# Patient Record
Sex: MALE | Race: WHITE | Employment: UNEMPLOYED | URBAN - METROPOLITAN AREA
[De-identification: names, ages, dates, MRNs, and addresses within clinical notes are randomized per-mention and may not be internally consistent; named-entity substitution may affect disease eponyms.]

---

## 2017-05-16 ENCOUNTER — HOSPITAL ENCOUNTER (OUTPATIENT)
Dept: LAB | Age: 47
Discharge: HOME | End: 2017-05-16

## 2017-05-16 PROCEDURE — 83036 HEMOGLOBIN GLYCOSYLATED A1C: CPT | Performed by: NURSE PRACTITIONER

## 2017-05-16 PROCEDURE — 84443 ASSAY THYROID STIM HORMONE: CPT | Performed by: NURSE PRACTITIONER

## 2017-05-16 PROCEDURE — 84460 ALANINE AMINO (ALT) (SGPT): CPT | Performed by: NURSE PRACTITIONER

## 2017-05-16 PROCEDURE — 80061 LIPID PANEL: CPT | Performed by: NURSE PRACTITIONER

## 2017-05-17 LAB
ALT SERPL-CCNC: 32 U/L (ref 12–78)
CHOLEST SERPL-MCNC: 208 MG/DL
EST. AVERAGE GLUCOSE BLD GHB EST-MCNC: 154 MG/DL
HBA1C MFR BLD: 7 % (ref 4.2–6.3)
HDLC SERPL-MCNC: 47 MG/DL
HDLC SERPL: 4.4 {RATIO} (ref 0–5)
LDLC SERPL CALC-MCNC: 127.4 MG/DL (ref 0–100)
LIPID PROFILE,FLP: ABNORMAL
TRIGL SERPL-MCNC: 168 MG/DL (ref ?–150)
TSH SERPL DL<=0.05 MIU/L-ACNC: 2.07 UIU/ML (ref 0.36–3.74)
VLDLC SERPL CALC-MCNC: 33.6 MG/DL

## 2017-07-26 ENCOUNTER — HOSPITAL ENCOUNTER (OUTPATIENT)
Dept: ULTRASOUND IMAGING | Age: 47
Discharge: HOME | End: 2017-07-26
Payer: SUBSIDIZED

## 2017-07-26 DIAGNOSIS — E04.9 ENLARGEMENT OF THYROID: ICD-10-CM

## 2017-07-26 PROCEDURE — 76536 US EXAM OF HEAD AND NECK: CPT

## 2017-08-01 ENCOUNTER — HOSPITAL ENCOUNTER (OUTPATIENT)
Dept: LAB | Age: 47
Discharge: HOME | End: 2017-08-01

## 2017-08-01 LAB
ALBUMIN SERPL BCP-MCNC: 4.2 G/DL (ref 3.5–5)
ALBUMIN/GLOB SERPL: 1.1 {RATIO} (ref 1.1–2.2)
ALP SERPL-CCNC: 122 U/L (ref 45–117)
ALT SERPL-CCNC: 46 U/L (ref 12–78)
ANION GAP BLD CALC-SCNC: 6 MMOL/L (ref 5–15)
AST SERPL W P-5'-P-CCNC: 21 U/L (ref 15–37)
BASOPHILS # BLD AUTO: 0 K/UL (ref 0–0.1)
BASOPHILS # BLD: 0 % (ref 0–1)
BILIRUB SERPL-MCNC: 0.3 MG/DL (ref 0.2–1)
BUN SERPL-MCNC: 16 MG/DL (ref 6–20)
BUN/CREAT SERPL: 20 (ref 12–20)
CALCIUM SERPL-MCNC: 8.8 MG/DL (ref 8.5–10.1)
CHLORIDE SERPL-SCNC: 104 MMOL/L (ref 97–108)
CO2 SERPL-SCNC: 25 MMOL/L (ref 21–32)
CREAT SERPL-MCNC: 0.81 MG/DL (ref 0.7–1.3)
EOSINOPHIL # BLD: 0.1 K/UL (ref 0–0.4)
EOSINOPHIL NFR BLD: 2 % (ref 0–7)
ERYTHROCYTE [DISTWIDTH] IN BLOOD BY AUTOMATED COUNT: 12.9 % (ref 11.5–14.5)
ERYTHROCYTE [SEDIMENTATION RATE] IN BLOOD: 4 MM/HR (ref 0–15)
GLOBULIN SER CALC-MCNC: 3.8 G/DL (ref 2–4)
GLUCOSE SERPL-MCNC: 185 MG/DL (ref 65–100)
HCT VFR BLD AUTO: 44.6 % (ref 36.6–50.3)
HGB BLD-MCNC: 15.4 G/DL (ref 12.1–17)
LYMPHOCYTES # BLD AUTO: 39 % (ref 12–49)
LYMPHOCYTES # BLD: 2.5 K/UL (ref 0.8–3.5)
MCH RBC QN AUTO: 28.9 PG (ref 26–34)
MCHC RBC AUTO-ENTMCNC: 34.5 G/DL (ref 30–36.5)
MCV RBC AUTO: 83.7 FL (ref 80–99)
MONOCYTES # BLD: 0.5 K/UL (ref 0–1)
MONOCYTES NFR BLD AUTO: 8 % (ref 5–13)
NEUTS SEG # BLD: 3.3 K/UL (ref 1.8–8)
NEUTS SEG NFR BLD AUTO: 51 % (ref 32–75)
PLATELET # BLD AUTO: 211 K/UL (ref 150–400)
POTASSIUM SERPL-SCNC: 4.1 MMOL/L (ref 3.5–5.1)
PROT SERPL-MCNC: 8 G/DL (ref 6.4–8.2)
RBC # BLD AUTO: 5.33 M/UL (ref 4.1–5.7)
SODIUM SERPL-SCNC: 135 MMOL/L (ref 136–145)
T4 FREE SERPL-MCNC: 1.1 NG/DL (ref 0.8–1.5)
TSH SERPL DL<=0.05 MIU/L-ACNC: 1.79 UIU/ML (ref 0.36–3.74)
WBC # BLD AUTO: 6.5 K/UL (ref 4.1–11.1)

## 2017-08-01 PROCEDURE — 84443 ASSAY THYROID STIM HORMONE: CPT | Performed by: NURSE PRACTITIONER

## 2017-08-01 PROCEDURE — 80053 COMPREHEN METABOLIC PANEL: CPT | Performed by: NURSE PRACTITIONER

## 2017-08-01 PROCEDURE — 85652 RBC SED RATE AUTOMATED: CPT | Performed by: NURSE PRACTITIONER

## 2017-08-01 PROCEDURE — 86480 TB TEST CELL IMMUN MEASURE: CPT | Performed by: NURSE PRACTITIONER

## 2017-08-01 PROCEDURE — 84439 ASSAY OF FREE THYROXINE: CPT | Performed by: NURSE PRACTITIONER

## 2017-08-01 PROCEDURE — 85025 COMPLETE CBC W/AUTO DIFF WBC: CPT | Performed by: NURSE PRACTITIONER

## 2017-08-01 PROCEDURE — 87340 HEPATITIS B SURFACE AG IA: CPT | Performed by: NURSE PRACTITIONER

## 2017-08-01 PROCEDURE — 84480 ASSAY TRIIODOTHYRONINE (T3): CPT | Performed by: NURSE PRACTITIONER

## 2017-08-01 PROCEDURE — 86803 HEPATITIS C AB TEST: CPT | Performed by: NURSE PRACTITIONER

## 2017-08-02 LAB
HBV SURFACE AG SER QL: <0.1 INDEX
HBV SURFACE AG SER QL: NEGATIVE
HCV AB SERPL QL IA: NONREACTIVE
HCV COMMENT,HCGAC: NORMAL

## 2017-08-03 LAB — T3 SERPL-MCNC: 114 NG/DL (ref 71–180)

## 2017-08-04 LAB
ANNOTATION COMMENT IMP: NORMAL
M TB IFN-G CD4+ BCKGRND COR BLD-ACNC: <0 IU/ML
M TB IFN-G CD4+ T-CELLS BLD-ACNC: 0.06 IU/ML
M TB TUBERC IFN-G BLD QL: NEGATIVE
M TB TUBERC IGNF/MITOGEN IGNF CONTROL: 2.63 IU/ML
QUANTIFERON NIL VALUE: 0.1 IU/ML
SERVICE CMNT-IMP: NORMAL

## 2017-11-28 ENCOUNTER — HOSPITAL ENCOUNTER (OUTPATIENT)
Dept: ULTRASOUND IMAGING | Age: 47
Discharge: HOME | End: 2017-11-28
Attending: INTERNAL MEDICINE
Payer: SUBSIDIZED

## 2017-11-28 DIAGNOSIS — R93.89 ABNORMAL THYROID ULTRASOUND: ICD-10-CM

## 2017-11-28 PROCEDURE — 76536 US EXAM OF HEAD AND NECK: CPT

## 2018-03-15 ENCOUNTER — HOSPITAL ENCOUNTER (OUTPATIENT)
Dept: ULTRASOUND IMAGING | Age: 48
Discharge: HOME | End: 2018-03-15
Attending: INTERNAL MEDICINE
Payer: SUBSIDIZED

## 2018-03-15 DIAGNOSIS — E04.1 THYROID NODULE: ICD-10-CM

## 2018-03-15 PROCEDURE — 76536 US EXAM OF HEAD AND NECK: CPT

## 2018-06-14 PROCEDURE — 80053 COMPREHEN METABOLIC PANEL: CPT | Performed by: NURSE PRACTITIONER

## 2018-06-14 PROCEDURE — 80061 LIPID PANEL: CPT | Performed by: NURSE PRACTITIONER

## 2018-06-15 ENCOUNTER — HOSPITAL ENCOUNTER (OUTPATIENT)
Dept: LAB | Age: 48
Discharge: HOME | End: 2018-06-15

## 2018-06-15 LAB
ALBUMIN SERPL-MCNC: 3.9 G/DL (ref 3.5–5)
ALBUMIN/GLOB SERPL: 0.9 {RATIO} (ref 1.1–2.2)
ALP SERPL-CCNC: 133 U/L (ref 45–117)
ALT SERPL-CCNC: 34 U/L (ref 12–78)
ANION GAP SERPL CALC-SCNC: 12 MMOL/L (ref 5–15)
AST SERPL-CCNC: 20 U/L (ref 15–37)
BILIRUB SERPL-MCNC: 0.5 MG/DL (ref 0.2–1)
BUN SERPL-MCNC: 15 MG/DL (ref 6–20)
BUN/CREAT SERPL: 16 (ref 12–20)
CALCIUM SERPL-MCNC: 9.3 MG/DL (ref 8.5–10.1)
CHLORIDE SERPL-SCNC: 105 MMOL/L (ref 97–108)
CHOLEST SERPL-MCNC: 235 MG/DL
CO2 SERPL-SCNC: 25 MMOL/L (ref 21–32)
CREAT SERPL-MCNC: 0.96 MG/DL (ref 0.7–1.3)
GLOBULIN SER CALC-MCNC: 4.2 G/DL (ref 2–4)
GLUCOSE SERPL-MCNC: 128 MG/DL (ref 65–100)
HDLC SERPL-MCNC: 35 MG/DL
HDLC SERPL: 6.7 {RATIO} (ref 0–5)
LDLC SERPL CALC-MCNC: 165.2 MG/DL (ref 0–100)
LIPID PROFILE,FLP: ABNORMAL
POTASSIUM SERPL-SCNC: 4.6 MMOL/L (ref 3.5–5.1)
PROT SERPL-MCNC: 8.1 G/DL (ref 6.4–8.2)
SODIUM SERPL-SCNC: 142 MMOL/L (ref 136–145)
TRIGL SERPL-MCNC: 174 MG/DL (ref ?–150)
VLDLC SERPL CALC-MCNC: 34.8 MG/DL

## 2019-02-27 ENCOUNTER — HOSPITAL ENCOUNTER (OUTPATIENT)
Dept: ULTRASOUND IMAGING | Age: 49
Discharge: HOME | End: 2019-02-27
Payer: MEDICAID

## 2019-02-27 DIAGNOSIS — E04.1 THYROID NODULE: ICD-10-CM

## 2019-02-27 PROCEDURE — 76536 US EXAM OF HEAD AND NECK: CPT

## 2019-03-30 ENCOUNTER — APPOINTMENT (OUTPATIENT)
Dept: GENERAL RADIOLOGY | Age: 49
End: 2019-03-30
Attending: EMERGENCY MEDICINE
Payer: MEDICAID

## 2019-03-30 ENCOUNTER — HOSPITAL ENCOUNTER (EMERGENCY)
Age: 49
Discharge: HOME | End: 2019-03-30
Attending: EMERGENCY MEDICINE
Payer: MEDICAID

## 2019-03-30 VITALS
SYSTOLIC BLOOD PRESSURE: 107 MMHG | HEART RATE: 69 BPM | RESPIRATION RATE: 19 BRPM | DIASTOLIC BLOOD PRESSURE: 69 MMHG | TEMPERATURE: 98.3 F | OXYGEN SATURATION: 97 %

## 2019-03-30 DIAGNOSIS — R07.81 PLEURODYNIA: Primary | ICD-10-CM

## 2019-03-30 LAB
ALBUMIN SERPL-MCNC: 3.8 G/DL (ref 3.5–5)
ALBUMIN/GLOB SERPL: 1 {RATIO} (ref 1.1–2.2)
ALP SERPL-CCNC: 139 U/L (ref 45–117)
ALT SERPL-CCNC: 35 U/L (ref 12–78)
ANION GAP SERPL CALC-SCNC: 7 MMOL/L (ref 5–15)
AST SERPL-CCNC: 19 U/L (ref 15–37)
BASOPHILS # BLD: 0 K/UL (ref 0–0.1)
BASOPHILS NFR BLD: 0 % (ref 0–1)
BILIRUB SERPL-MCNC: 0.3 MG/DL (ref 0.2–1)
BUN SERPL-MCNC: 19 MG/DL (ref 6–20)
BUN/CREAT SERPL: 22 (ref 12–20)
CALCIUM SERPL-MCNC: 9.2 MG/DL (ref 8.5–10.1)
CHLORIDE SERPL-SCNC: 108 MMOL/L (ref 97–108)
CO2 SERPL-SCNC: 25 MMOL/L (ref 21–32)
COMMENT, HOLDF: NORMAL
CREAT SERPL-MCNC: 0.88 MG/DL (ref 0.7–1.3)
D DIMER PPP FEU-MCNC: 0.25 MG/L FEU (ref 0–0.65)
DIFFERENTIAL METHOD BLD: NORMAL
EOSINOPHIL # BLD: 0.1 K/UL (ref 0–0.4)
EOSINOPHIL NFR BLD: 1 % (ref 0–7)
ERYTHROCYTE [DISTWIDTH] IN BLOOD BY AUTOMATED COUNT: 12.4 % (ref 11.5–14.5)
GLOBULIN SER CALC-MCNC: 4 G/DL (ref 2–4)
GLUCOSE SERPL-MCNC: 137 MG/DL (ref 65–100)
HCT VFR BLD AUTO: 46.9 % (ref 36.6–50.3)
HGB BLD-MCNC: 15.9 G/DL (ref 12.1–17)
IMM GRANULOCYTES # BLD AUTO: 0 K/UL (ref 0–0.04)
IMM GRANULOCYTES NFR BLD AUTO: 0 % (ref 0–0.5)
LYMPHOCYTES # BLD: 2.6 K/UL (ref 0.8–3.5)
LYMPHOCYTES NFR BLD: 32 % (ref 12–49)
MCH RBC QN AUTO: 29.1 PG (ref 26–34)
MCHC RBC AUTO-ENTMCNC: 33.9 G/DL (ref 30–36.5)
MCV RBC AUTO: 85.9 FL (ref 80–99)
MONOCYTES # BLD: 0.7 K/UL (ref 0–1)
MONOCYTES NFR BLD: 9 % (ref 5–13)
NEUTS SEG # BLD: 4.6 K/UL (ref 1.8–8)
NEUTS SEG NFR BLD: 58 % (ref 32–75)
NRBC # BLD: 0 K/UL (ref 0–0.01)
NRBC BLD-RTO: 0 PER 100 WBC
PLATELET # BLD AUTO: 255 K/UL (ref 150–400)
PMV BLD AUTO: 10.1 FL (ref 8.9–12.9)
POTASSIUM SERPL-SCNC: 3.7 MMOL/L (ref 3.5–5.1)
PROT SERPL-MCNC: 7.8 G/DL (ref 6.4–8.2)
RBC # BLD AUTO: 5.46 M/UL (ref 4.1–5.7)
SAMPLES BEING HELD,HOLD: NORMAL
SODIUM SERPL-SCNC: 140 MMOL/L (ref 136–145)
TROPONIN I SERPL-MCNC: <0.05 NG/ML
WBC # BLD AUTO: 8 K/UL (ref 4.1–11.1)

## 2019-03-30 PROCEDURE — 93005 ELECTROCARDIOGRAM TRACING: CPT

## 2019-03-30 PROCEDURE — 85025 COMPLETE CBC W/AUTO DIFF WBC: CPT

## 2019-03-30 PROCEDURE — 85379 FIBRIN DEGRADATION QUANT: CPT

## 2019-03-30 PROCEDURE — 80053 COMPREHEN METABOLIC PANEL: CPT

## 2019-03-30 PROCEDURE — 99284 EMERGENCY DEPT VISIT MOD MDM: CPT

## 2019-03-30 PROCEDURE — 84484 ASSAY OF TROPONIN QUANT: CPT

## 2019-03-30 PROCEDURE — 71046 X-RAY EXAM CHEST 2 VIEWS: CPT

## 2019-03-31 LAB
ATRIAL RATE: 88 BPM
CALCULATED P AXIS, ECG09: 42 DEGREES
CALCULATED R AXIS, ECG10: -22 DEGREES
CALCULATED T AXIS, ECG11: 41 DEGREES
DIAGNOSIS, 93000: NORMAL
P-R INTERVAL, ECG05: 144 MS
Q-T INTERVAL, ECG07: 358 MS
QRS DURATION, ECG06: 82 MS
QTC CALCULATION (BEZET), ECG08: 433 MS
VENTRICULAR RATE, ECG03: 88 BPM

## 2019-03-31 NOTE — ED PROVIDER NOTES
53 y.o. male with past medical history significant for DM, hypercholesterolemia, and arthritis who presents ambulatory from home with chief complaint of chest pain. Patient arrives c/o intermittent left-sided chest pain x2-3 weeks, worse with ambulating up/down stairs and at night - states it feels like a knife is being \"pushed\" into his chest. Patient denies significant cardiac Hx. Patient called his PCP for the same and was referred here for a stress test and x-ray imaging. Patient also reports recent exertional SOB. Patient notes bilateral shoulder/arm pain that he states is chronic d/t his Hx of arthritis. Patient denies fever, chills, diaphoresis, and n/v. There are no other acute medical concerns at this time. Social hx: Current some day tobacco smoker (1ppd); Denies EtOH use; Denies illicit drug use;  Works as a   PCP: Sharath Peralta MD    Note written by Elly Solitario, as dictated by Rosio Pillai MD 9:21 PM           Past Medical History:   Diagnosis Date    Arthritis     Diabetes ([facility] 5/27/2009    Elevated liver enzymes     resolved     Hypercholesterolemia     Psoriasis 5/27/2009    Psoriatic arthritis ([facility] 5/27/2009       Past Surgical History:   Procedure Laterality Date    HX CATARACT REMOVAL      right         Family History:   Problem Relation Age of Onset    Diabetes Paternal Grandmother     Elevated Lipids Mother     High Cholesterol Mother        Social History     Socioeconomic History    Marital status:      Spouse name: Not on file    Number of children: Not on file    Years of education: Not on file    Highest education level: Not on file   Occupational History    Not on file   Social Needs    Financial resource strain: Not on file    Food insecurity:     Worry: Not on file     Inability: Not on file    Transportation needs:     Medical: Not on file     Non-medical: Not on file   Tobacco Use    Smoking status: Current Some Day Smoker Packs/day: 1.00     Years: 10.00     Pack years: 10.00     Types: Cigarettes    Smokeless tobacco: Never Used   Substance and Sexual Activity    Alcohol use: No    Drug use: No    Sexual activity: Yes     Partners: Female   Lifestyle    Physical activity:     Days per week: Not on file     Minutes per session: Not on file    Stress: Not on file   Relationships    Social connections:     Talks on phone: Not on file     Gets together: Not on file     Attends Baptism service: Not on file     Active member of club or organization: Not on file     Attends meetings of clubs or organizations: Not on file     Relationship status: Not on file    Intimate partner violence:     Fear of current or ex partner: Not on file     Emotionally abused: Not on file     Physically abused: Not on file     Forced sexual activity: Not on file   Other Topics Concern    Not on file   Social History Narrative    Not on file         ALLERGIES: Iodinated contrast- oral and iv dye    Review of Systems   Constitutional: Negative for chills, diaphoresis and fever. HENT: Negative for congestion, postnasal drip, rhinorrhea and sore throat. Eyes: Negative for photophobia, discharge, redness and visual disturbance. Respiratory: Positive for shortness of breath (exertional). Negative for cough, chest tightness and wheezing. Cardiovascular: Positive for chest pain (left-sided). Negative for palpitations and leg swelling. Gastrointestinal: Negative for abdominal distention, abdominal pain, blood in stool, constipation, diarrhea, nausea and vomiting. Genitourinary: Negative for difficulty urinating, dysuria, frequency, hematuria and urgency. Musculoskeletal: Negative for arthralgias, back pain, joint swelling and myalgias. +chronic bilateral shoulder pain   Skin: Negative for color change and rash. Neurological: Negative for dizziness, speech difficulty, weakness, light-headedness, numbness and headaches. Psychiatric/Behavioral: Negative for confusion. The patient is not nervous/anxious. All other systems reviewed and are negative. Vitals:    03/30/19 2030   Pulse: 83   SpO2: 96%            Physical Exam   Constitutional: He is oriented to person, place, and time. He appears well-developed and well-nourished. No distress. HENT:   Head: Normocephalic and atraumatic. Right Ear: External ear normal.   Left Ear: External ear normal.   Nose: Nose normal.   Mouth/Throat: Oropharynx is clear and moist.   Eyes: Pupils are equal, round, and reactive to light. Conjunctivae and EOM are normal. No scleral icterus. Neck: Normal range of motion. Neck supple. No JVD present. No tracheal deviation present. No thyromegaly present. Cardiovascular: Normal rate, regular rhythm and normal heart sounds. Exam reveals no gallop and no friction rub. No murmur heard. Pulmonary/Chest: Effort normal and breath sounds normal. No respiratory distress. He has no wheezes. He has no rales. He exhibits no tenderness. Abdominal: Soft. Bowel sounds are normal. He exhibits no distension and no mass. There is no tenderness. There is no rebound and no guarding. Musculoskeletal: Normal range of motion. He exhibits no edema or tenderness. Lymphadenopathy:     He has no cervical adenopathy. Neurological: He is alert and oriented to person, place, and time. He has normal strength. He displays no atrophy and no tremor. No cranial nerve deficit. He exhibits normal muscle tone. Coordination and gait normal.   Skin: Skin is warm and dry. No rash noted. He is not diaphoretic. No erythema. Psychiatric: He has a normal mood and affect. His behavior is normal. Judgment and thought content normal.   Nursing note and vitals reviewed.      Note written by Elly Morton Cha, as dictated by Carmina Trevizo MD 9:21 PM    MDM  Number of Diagnoses or Management Options  Diagnosis management comments: MINOO  Impression: 46-year-old male presenting to the emergency department with left-sided chest discomfort which has been intermittent and ongoing for some time. There is a musculoskeletal component to it as changes in his body position worsen the pain. He does have a history of diabetes, arthritis, and is a smoker her denies any cardiac disease. He does not characterize any other typical symptoms consistent with angina with this chest discomfort. He drives a taxi cab for appointment. No history of pulmonary embolism. Plan of care we baseline labs included troponin I, d-dimer. We'll treat accordingly. Procedures    ED EKG interpretation:  Rhythm: normal sinus rhythm; and regular . Rate (approx.): 88; ST/T wave: no acute changes.   Note written by lEly Monahan, as dictated by Jade Mcbride MD 9:21 PM

## 2019-03-31 NOTE — DISCHARGE INSTRUCTIONS
Continue your routine medications after discharge from the Emergency Department  Take Ibuprofen 3-200mg tablets every 6 hours with food. Patient Education        Musculoskeletal Chest Pain: Care Instructions  Your Care Instructions    Chest pain is not always a sign that something is wrong with your heart or that you have another serious problem. The doctor thinks your chest pain is caused by strained muscles or ligaments, inflamed chest cartilage, or another problem in your chest, rather than by your heart. You may need more tests to find the cause of your chest pain. Follow-up care is a key part of your treatment and safety. Be sure to make and go to all appointments, and call your doctor if you are having problems. It's also a good idea to know your test results and keep a list of the medicines you take. How can you care for yourself at home? · Take pain medicines exactly as directed. ? If the doctor gave you a prescription medicine for pain, take it as prescribed. ? If you are not taking a prescription pain medicine, ask your doctor if you can take an over-the-counter medicine. · Rest and protect the sore area. · Stop, change, or take a break from any activity that may be causing your pain or soreness. · Put ice or a cold pack on the sore area for 10 to 20 minutes at a time. Try to do this every 1 to 2 hours for the next 3 days (when you are awake) or until the swelling goes down. Put a thin cloth between the ice and your skin. · After 2 or 3 days, apply a heating pad set on low or a warm cloth to the area that hurts. Some doctors suggest that you go back and forth between hot and cold. · Do not wrap or tape your ribs for support. This may cause you to take smaller breaths, which could increase your risk of lung problems. · Mentholated creams such as Bengay or Icy Hot may soothe sore muscles. Follow the instructions on the package. · Follow your doctor's instructions for exercising.   · Gentle stretching and massage may help you get better faster. Stretch slowly to the point just before pain begins, and hold the stretch for at least 15 to 30 seconds. Do this 3 or 4 times a day. Stretch just after you have applied heat. · As your pain gets better, slowly return to your normal activities. Any increased pain may be a sign that you need to rest a while longer. When should you call for help? Call 911 anytime you think you may need emergency care. For example, call if:    · You have chest pain or pressure. This may occur with:  ? Sweating. ? Shortness of breath. ? Nausea or vomiting. ? Pain that spreads from the chest to the neck, jaw, or one or both shoulders or arms. ? Dizziness or lightheadedness. ? A fast or uneven pulse. After calling 911, chew 1 adult-strength aspirin. Wait for an ambulance. Do not try to drive yourself.     · You have sudden chest pain and shortness of breath, or you cough up blood.    Call your doctor now or seek immediate medical care if:    · You have any trouble breathing.     · Your chest pain gets worse.     · Your chest pain occurs consistently with exercise and is relieved by rest.    Watch closely for changes in your health, and be sure to contact your doctor if:    · Your chest pain does not get better after 1 week. Where can you learn more? Go to http://vishal-bertin.info/. Enter V293 in the search box to learn more about \"Musculoskeletal Chest Pain: Care Instructions. \"  Current as of: September 23, 2018  Content Version: 11.9  © 8627-6529 [employer]. Care instructions adapted under license by [employer] (which disclaims liability or warranty for this information). If you have questions about a medical condition or this instruction, always ask your healthcare professional. [place] [address] any warranty or liability for your use of this information.

## 2019-03-31 NOTE — ED TRIAGE NOTES
Pt arrives from home with c/o intermittent LEFT chest pain when going up and down the stairs \"if felt like a knife\" +sob with exertion. No cardiac hx. Pt reports that he also felt LEFT chest pain 2-3 weeks ago when  He was coughing. He was seen by a dr and dr recommended a stress test. Denies n/v/fevers/diaphoresis    Hx of arthritis. Also c/o of bilateral shoulder/arm pain that's chronic.

## 2019-04-29 ENCOUNTER — HOSPITAL ENCOUNTER (OUTPATIENT)
Dept: NON INVASIVE DIAGNOSTICS | Age: 49
Discharge: HOME | End: 2019-04-29
Attending: INTERNAL MEDICINE
Payer: MEDICAID

## 2019-04-29 ENCOUNTER — HOSPITAL ENCOUNTER (OUTPATIENT)
Dept: NUCLEAR MEDICINE | Age: 49
Discharge: HOME | End: 2019-04-29
Attending: INTERNAL MEDICINE
Payer: MEDICAID

## 2019-04-29 VITALS
HEIGHT: 69 IN | WEIGHT: 175 LBS | BODY MASS INDEX: 25.92 KG/M2 | SYSTOLIC BLOOD PRESSURE: 113 MMHG | DIASTOLIC BLOOD PRESSURE: 69 MMHG

## 2019-04-29 DIAGNOSIS — R00.2 HEART PALPITATIONS: ICD-10-CM

## 2019-04-29 DIAGNOSIS — E11.9 DIABETES MELLITUS, TYPE II (HCC): ICD-10-CM

## 2019-04-29 DIAGNOSIS — F17.200 SMOKING: ICD-10-CM

## 2019-04-29 DIAGNOSIS — R07.89 OTHER CHEST PAIN: ICD-10-CM

## 2019-04-29 PROCEDURE — A9500 TC99M SESTAMIBI: HCPCS

## 2019-04-29 PROCEDURE — 93017 CV STRESS TEST TRACING ONLY: CPT

## 2019-04-29 PROCEDURE — 74011250636 HC RX REV CODE- 250/636: Performed by: INTERNAL MEDICINE

## 2019-04-29 RX ORDER — CAFFEINE CITRATE 20 MG/ML
60 SOLUTION INTRAVENOUS ONCE
Status: COMPLETED | OUTPATIENT
Start: 2019-04-29 | End: 2019-04-29

## 2019-04-29 RX ORDER — SODIUM CHLORIDE 0.9 % (FLUSH) 0.9 %
20 SYRINGE (ML) INJECTION
Status: COMPLETED | OUTPATIENT
Start: 2019-04-29 | End: 2019-04-29

## 2019-04-29 RX ADMIN — Medication 20 ML: at 10:31

## 2019-04-29 RX ADMIN — CAFFEINE CITRATE 60 MG: 20 INJECTION, SOLUTION INTRAVENOUS at 11:09

## 2019-04-29 RX ADMIN — REGADENOSON 0.4 MG: 0.08 INJECTION, SOLUTION INTRAVENOUS at 10:30

## 2019-04-30 LAB
STRESS BASELINE DIAS BP: 69 MMHG
STRESS BASELINE HR: 61 BPM
STRESS BASELINE SYS BP: 113 MMHG
STRESS ESTIMATED WORKLOAD: 1 METS
STRESS EXERCISE DUR MIN: NORMAL
STRESS PEAK DIAS BP: 84 MMHG
STRESS PEAK SYS BP: 134 MMHG
STRESS PERCENT HR ACHIEVED: 65 %
STRESS POST PEAK HR: 112 BPM
STRESS RATE PRESSURE PRODUCT: NORMAL BPM*MMHG
STRESS ST DEPRESSION: 1 MM
STRESS TARGET HR: 173 BPM

## 2019-05-28 ENCOUNTER — TELEPHONE (OUTPATIENT)
Dept: RHEUMATOLOGY | Age: 49
End: 2019-05-28

## 2019-05-28 NOTE — TELEPHONE ENCOUNTER
I called and confirmed with the patient on 5/28/2019 for their next day appointment 5/29/2019 to arrive 30 minutes before their appointment to fill out office paperwork. SDH.

## 2019-05-29 ENCOUNTER — OFFICE VISIT (OUTPATIENT)
Dept: RHEUMATOLOGY | Age: 49
End: 2019-05-29

## 2019-05-29 VITALS
WEIGHT: 216 LBS | HEIGHT: 69 IN | TEMPERATURE: 97.9 F | DIASTOLIC BLOOD PRESSURE: 68 MMHG | BODY MASS INDEX: 31.99 KG/M2 | HEART RATE: 71 BPM | SYSTOLIC BLOOD PRESSURE: 106 MMHG | RESPIRATION RATE: 18 BRPM

## 2019-05-29 DIAGNOSIS — Z72.0 TOBACCO USE: ICD-10-CM

## 2019-05-29 DIAGNOSIS — L40.50 PSORIATIC ARTHRITIS (HCC): Primary | ICD-10-CM

## 2019-05-29 DIAGNOSIS — M25.421 ELBOW JOINT EFFUSION, RIGHT: ICD-10-CM

## 2019-05-29 DIAGNOSIS — L40.9 PSORIASIS: ICD-10-CM

## 2019-05-29 DIAGNOSIS — Z79.60 LONG-TERM USE OF IMMUNOSUPPRESSANT MEDICATION: ICD-10-CM

## 2019-05-29 DIAGNOSIS — M17.0 PRIMARY OSTEOARTHRITIS OF BOTH KNEES: ICD-10-CM

## 2019-05-29 DIAGNOSIS — Z79.1 LONG TERM CURRENT USE OF NON-STEROIDAL ANTI-INFLAMMATORIES (NSAID): ICD-10-CM

## 2019-05-29 RX ORDER — GLIMEPIRIDE 2 MG/1
4 TABLET ORAL
COMMUNITY
End: 2021-04-05 | Stop reason: SDUPTHER

## 2019-05-29 RX ORDER — CELECOXIB 100 MG/1
CAPSULE ORAL 2 TIMES DAILY
COMMUNITY
End: 2019-05-29 | Stop reason: SDUPTHER

## 2019-05-29 RX ORDER — CELECOXIB 200 MG/1
200 CAPSULE ORAL 2 TIMES DAILY
Qty: 360 CAP | Refills: 2 | Status: SHIPPED | OUTPATIENT
Start: 2019-05-29 | End: 2019-07-10 | Stop reason: CLARIF

## 2019-05-29 NOTE — PROGRESS NOTES
REASON FOR VISIT    This is the initial evaluation for Mr. Husam Joyner a 53 y.o. [place] ([facility]) male for question of a seronegative spondyloarthritis. The patient is referred to the [facility] at the request of . No ref. provider found. HISTORY OF PRESENT ILLNESS     I have reviewed and summarized old records from [employer]    He has a more than 15 year history of psoriatic arthritis and was previously under the care of Dr. Mireille Gonzalez. He had last seen Dr. Uri Bashir on 3/02/2016 who was managing him with Otezla 30 mg twice daily. He had been treated with Enbrel, Humira, Remicade and Stelara as well. Stelara helped his skin but not his joints. Demetrice Stephane helped his psoriasis but not his joints. He also has a history of positive PPD that was treated with isoniazid in the past.     In 12/15/2014, Right Elbow radiograph showed a moderate right elbow effusion. A nondisplaced fracture of the radial head is suspected. No dislocation is shown. In 12/22/2014, MRI Right Elbow without contrast showed there is a large complex appearing elbow joint effusion. There is  diffuse chondral thinning. Mild diffuse osseous edema is shown. The elbow collateral ligaments are intact. No tendon derangement is demonstrated. No soft tissue mass is shown. In 8/01/2017, labs showed negative Quantiferon TB, HBsAg, HCV. In 3/30/2019, labs showed WBC 8.0, lymphocytes 2.6, Hct 46.9%, platelets 589,670, creatinine 0.88 mg/dL, eGFR >60, albumin 3.8 g/dL,  U/L, ALT 35 U/L, AST 19 U/L. Chest radiograph showed cardiac monitoring wires overlie the thorax. The cardiomediastinal and hilar contours are within normal limits. The pulmonary vasculature is within normal limits. The lungs and pleural spaces are clear. The visualized bones and upper abdomen are age-appropriate.     In 6/2019, he started Cosentyx 300 mg by his dermatologist, Dr. Estevan Chen, which helped a lot his psoriasis (he reports previously having 80% involvement) but now a little in his ankle. He also feels that Cosentyx. Today, he complains of pain in right elbow, bilateral arms, hands, and knees. He cannot fully extend his right elbow and knees. He no longer has swelling. He has morning stiffness lasting minutes. When he sleeps at night, he has pain in his right knee because of its inability to fully extend. He uses Celebrex 100 mg with some benefit but wants something stronger. He is traveling to [facility] for 6 months. Family history is negative for psoriasis or psoriatic arthritis. He is a smoker of 0.5 PPD. Therapy History includes:  Current NSAIDs include: Celebrex 100 mg twice daily  Current DMARD include: Cosentyx 300 mg every 30 days (6/2018)  Prior DMARD therapy includes: methotrexate 15 mg weekly (8/13/2014 to 5/27/2015; insurance coverage), sulfasalazine 1000 mg twice daily (6/27/2012 to 8/13/2014;  12/02/2014 to 500 mg twice daily;10/02/2014 to 12/02/2014), Enbrel, Humira, Remicade, Otezla 30 mg twice daily (5/2015 to 6/2018), Stelara, Cosentyx  The following DMARDs have been ineffective: sulfasalazine, methotrexate, Otezla, Enbrel, Humira, Remicade   The following DMARDs were stopped because of side effects: none    REVIEW OF SYSTEMS    A 15 point review of systems was performed and summarized below. The questionnaire was reviewed with the patient and scanned into the patient's medical record.     General: denies recent weight gain, recent weight loss, fatigue, weakness, fever, night sweats  Musculoskeletal: endorses joint pain, joint swelling, muscle pain, denies morning stiffness  Ears: denies ringing in ears, loss of hearing, deafness  Eyes: endorses loss of vision, denies pain, redness, double vision, blurred vision, dryness, foreign body sensation  Mouth: endorses bleeding gums, denies sore tongue, oral ulcers, loss of taste, dryness, increased dental caries  Nose: denies nosebleeds, loss of smell, nasal ulcers  Throat: denies frequent sore throats, hoarseness, difficulty in swallowing, pain in jaw while chewing  Neck: endorses swollen glands, irregular heart beat, denies tender glands  Cardiopulmonary: denies pain in chest, sudden changes in heart beat, shortness of breath, difficulty breathing at night, dry cough, productive cough, coughing of blood, wheezing  Gastrointestinal: denies nausea, heartburn, stomach pain relieved by food, vomiting of blood/\"coffee grounds\", jaundice, increasing constipation, persistent diarrhea, blood in stools, black stools  Genitourinary: denies nocturia, difficult urination, pain or burning on urination, blood in urine, cloudy urine, pus in urine, genital discharge, frequent urination, rash/ulcers, sexual difficulties, prostate trouble  Hematologic: denies anemia, bleeding tendency, blood clots  Skin: denies easy bruising, sun sensitive, rash, redness, hives, skin tightness, nodules/bumps, hair loss, color changes of hands or feet in the cold (Raynaud's)  Neurologic: endorses muscle weakness, denies headaches, dizziness, numbness or tingling in hands/feet, memory loss  Psychiatric: denies depression, excessive worries, PTSD, Bipolar  Sleep: endorses poor sleep (5-6 hours), difficulty falling asleep, denies snoring, apnea, daytime somnolence, difficulty staying asleep     PAST MEDICAL HISTORY    He has a past medical history of Arthritis, Diabetes ([facility] [address] (5/27/2009), Elevated liver enzymes, Hypercholesterolemia, Psoriasis (5/27/2009), and [facility]atic [facility] (5/27/2009). FAMILY HISTORY    His family history includes Asthma in his sister; Diabetes in his father and paternal grandmother; No Known Problems in his brother and mother. SOCIAL HISTORY    He reports that he has been smoking cigarettes. He has a 10.00 pack-year smoking history. He has never used smokeless tobacco. He reports that he does not drink alcohol or use drugs.     HEALTH MAINTENANCE    Immunizations  Immunization History   Administered Date(s) Administered    H1N1 Influenza Virus Vaccine 10/27/2009    Influenza Vaccine Split 10/10/2012, 10/19/2012    Pneumococcal Polysaccharide (PPSV-23) 11/11/2015     MEDICATIONS    Current Outpatient Medications   Medication Sig Dispense Refill    glimepiride (AMARYL) 2 mg tablet Take  by mouth every morning.  SITagliptin (JANUVIA) 100 mg tablet Take 100 mg by mouth daily.  secukinumab (COSENTYX) 150 mg/mL syrg 300 mg by SubCUTAneous route every month. Indications: Psoriasis associated with Arthritis 6 Syringe 11    celecoxib (CELEBREX) 200 mg capsule Take 1 Cap by mouth two (2) times a day. 360 Cap 2    FARXIGA 5 mg tab tablet TAKE 1 TABLET BY MOUTH EVERY DAY 30 Tab 5    clobetasol (TEMOVATE) 0.05 % ointment Apply  to affected area two (2) times a day.  glucose blood VI test strips (ASCENSIA AUTODISC VI, ONE TOUCH ULTRA TEST VI) strip Twice a day 1 Package 12    Lancets Misc Bid as directed. 2 Package 12       ALLERGIES    Allergies   Allergen Reactions    Iodinated Contrast- Oral And Iv Dye Shortness of Breath       PHYSICAL EXAMINATION    Visit Vitals  /68   Pulse 71   Temp 97.9 °F (36.6 °C)   Resp 18   Ht 5' 9\" (1.753 m)   Wt 216 lb (98 kg)   BMI 31.90 kg/m²     Body mass index is 31.9 kg/m². General: Patient is alert, oriented x 3, not in acute distress    HEENT:   Conjunctiva are not injected and appear moist, there is no alopecia, neck is supple, there is no lymphadenopathy    Cardiovascular:  Heart is regular rate and rhythm, no murmurs. Chest:  Lungs are clear to auscultation bilaterally. Extremities:  Free of clubbing, cyanosis, edema, extremities well perfused. Neurological:  Muscle strength is full in upper and lower extremities.     Skin:    Psoriasis:     yes (small patch posterior Achilles)  Nail Pitting:     no  Onycholysis:     no  Palmoplantar pustulosis:   no  Acne fulminans:    no  Acne conglobata:    no  Hidradenitis Suppurativa:   no  Dissecting cellulitis of the scalp:  no  Pilonidal sinus:    no  Erythema nodosum:    no    Musculoskeletal:  A comprehensive musculoskeletal exam was performed for all joints of each upper and lower extremity and assessed for swelling, tenderness and range of motion. Right elbow flexion deformity  Bilateral knee crepitus with some decrease ROM of the right knee     Costochondritis:  no   Synovitis:   yes (see table. Bilateral MTPs. DIP LEFT: 2nd; RIGHT: 3rd)  Dactylitis:   no  Enthesitis:   no    Joint Count 5/29/2019   Left thumb IP - Tender 1   Left thumb IP - Swollen 0   Left 2nd PIP - Tender 1   Left 2nd PIP - Swollen 0   Left 4th PIP - Tender 1   Left 4th PIP - Swollen 0   Left 5th PIP - Tender 1   Left 5th PIP - Swollen 0   Right elbow - Tender 1   Right elbow - Swollen 1   Tender Joint Count (Total) 5   Swollen Joint Count (Total) 1       DATA REVIEW    Prior medical records were reviewed and are summarized as below:    Laboratory data: summarized in the HPI    Imaging: summarized in the HPI. ASSESSMENT AND PLAN    1) Psoriatic Arthritis. (psoriasis, arthritis) He is maintained on Cosentyx 300 mg monthly with great improvement of his psoriasis but he continues to have joint pain in his right elbow, fingers, and knees. He had a complex right elbow effusion noted on MRI in 12/22/2014 therefore, he may need surgical intervention to debride it, since it continues to be symptomatic. I referred him to orthopedics, Dr. Magdalena Schmitz for evaluation and treatment. He has osteoarthritis in his knees which may be secondary to PsA, so physical therapy is appropriate, and I referred him today. He has been on multiple biologics for his disease with mostly persistent  joint involvement being the reason why treatments were changed (Enbrel, Humira, Remicade, Otezla). I will therefore continue Cosentyx. I refilled Celebrex but gave him a higher dose since he had improvement on 100 mg twice daily but not sustained.  I ordered labs and radiographs today. 2) Long Term Use of Immunosuppressants. The patient remains on immunomodulatory medications (Cosentyx) and requires frequent toxicity monitoring by blood work. Respective labs were ordered (Chronic hepatitis panel, Quantiferon TB). 3) Bilateral Knee Osteoarthritis. The patient has osteoarthritis, which is also known as \"wear and tear arthritis,\" non-inflammatory arthritis or mechanical arthritis. There are hereditary, vocational and posttraumatic joint injuries predisposing factors. I recommend maintaining a healthy weight to slow the progression of osteoarthritis in addition to following the [employer]  Rheumatology Osteoarthritis Treatment Guidelines: (1) non-pharmacologic modalities such as aerobic, aquatic, and/or resistance exercises as well as weight loss for overweight patients; in addition to (2) pharmacologic modalities such as acetaminophen as first line, oral and topical NSAIDs as second line, tramadol as third line and intra-articular corticosteroid injections as fourth line (Raymond MC, et al. Arthritis Care Res [facility] ORTHOPEDIC). 2012;64(4):465). Naproxen is a low VEGAS-2 selectivity inhibitor that poses lower cardiovascular risk than other NSAIDs (Angiokhanh DJ, Judy SM. Clinical Pharmacology and Cardiovascular Safety of Naproxen. Am J Cardiovasc Drugs. 2016 Nov 8). NSAIDs should not be used in patients on blood thinners, chronic kidney disease, high risk coronary artery disease, and inflammatory bowel disease (ulcerative colitis or Crohn's disease) Joint replacement surgery if all previous fail, knowing that there is a 10 year lifespan per prosthesis. I recommend weight loss because every 1 lb of extra weight is approximately 5 lbs of extra weight on the knees. I referred him to physical therapy. He declines injections,     4) Long Term Use of NSAIDs. The patient remains on Celebrex. His most recent renal function was normal.     5) Tobacco Use. He smokes 0.5 PPD.  I counseled smoking cessation due to worsening prognosis of his PSA. The patient voiced understanding of the aforementioned assessment and plan. Summary of plan was provided in the After Visit Summary patient instructions. I also provided education about MyChart setup and utility.     TODAY'S ORDERS    Orders Placed This Encounter    QUANTIFERON-TB GOLD PLUS    CHRONIC HEPATITIS PANEL    URIC ACID    REFERRAL TO ORTHOPEDICS    REFERRAL TO PHYSICAL THERAPY    DISCONTD: celecoxib (CELEBREX) 100 mg capsule    secukinumab (COSENTYX) 150 mg/mL syrg    celecoxib (CELEBREX) 200 mg capsule     Future Appointments   Date Time Provider Gale Brewer   12/2/2019  1:40 PM MD Carine Stringer MD, [address]    Adult Rheumatology   Rheumatology Ultrasound Certified  [facility]  A Part of [facility], [address] [place] [address]   Phone [phone]  Fax [phone]

## 2019-05-29 NOTE — PATIENT INSTRUCTIONS
I recommend you see Dr. Radha Wilson for your right elbow    If you knee hurts you, I recommend injections    I will refer you to physical therapy for your knees

## 2019-05-29 NOTE — LETTER
5/29/19 Patient: Pedro Keyes YOB: 1970 Date of Visit: 5/29/2019 Chang Jason MD 
[address] [facility] [address] [place] [id number] VIA In Basket Dear Chang Jason MD, Thank you for referring Mr. Agus Zelaya to [address] [place] [address] for evaluation. My notes for this consultation are attached. If you have questions, please do not hesitate to call me. I look forward to following your patient along with you.  
 
 
Sincerely, 
 
Josefa Barboza MD

## 2019-06-02 LAB
COMMENT, 144067: NORMAL
GAMMA INTERFERON BACKGROUND BLD IA-ACNC: 0.06 IU/ML
HBV CORE AB SERPL QL IA: NEGATIVE
HBV CORE IGM SERPL QL IA: NEGATIVE
HBV E AB SERPL QL IA: NEGATIVE
HBV E AG SERPL QL IA: NEGATIVE
HBV SURFACE AB SER QL: NON REACTIVE
HBV SURFACE AG SERPL QL IA: NEGATIVE
HCV AB S/CO SERPL IA: <0.1 S/CO RATIO (ref 0–0.9)
M TB IFN-G BLD-IMP: NEGATIVE
M TB IFN-G CD4+ BCKGRND COR BLD-ACNC: 0.04 IU/ML
MITOGEN IGNF BLD-ACNC: >10 IU/ML
QUANTIFERON INCUBATION, QF1T: NORMAL
QUANTIFERON TB2 AG: 0.04 IU/ML
SERVICE CMNT-IMP: NORMAL
URATE SERPL-MCNC: 4.6 MG/DL (ref 3.7–8.6)

## 2019-06-11 ENCOUNTER — HOSPITAL ENCOUNTER (EMERGENCY)
Age: 49
Discharge: HOME | End: 2019-06-11
Attending: EMERGENCY MEDICINE
Payer: MEDICAID

## 2019-06-11 ENCOUNTER — DOCUMENTATION ONLY (OUTPATIENT)
Dept: PHARMACY | Age: 49
End: 2019-06-11

## 2019-06-11 VITALS
OXYGEN SATURATION: 97 % | DIASTOLIC BLOOD PRESSURE: 69 MMHG | TEMPERATURE: 98.8 F | RESPIRATION RATE: 16 BRPM | HEART RATE: 90 BPM | SYSTOLIC BLOOD PRESSURE: 124 MMHG

## 2019-06-11 DIAGNOSIS — Z48.02 ENCOUNTER FOR STAPLE REMOVAL: Primary | ICD-10-CM

## 2019-06-11 PROCEDURE — 75810000275 HC EMERGENCY DEPT VISIT NO LEVEL OF CARE

## 2019-06-11 NOTE — ED PROVIDER NOTES
HPI   Pt states that he had staples placed in the left parietal scalp about 2 weeks ago at another facility. Skin integrity is intact There is no obvious deformity. Good neurovascular sensation.  He has not had any medications today prior to arrival.     Past Medical History:   Diagnosis Date    Arthritis     Diabetes ([facility] 5/27/2009    Elevated liver enzymes     resolved     Hypercholesterolemia     Psoriasis 5/27/2009    Psoriatic arthritis ([facility] 5/27/2009       Past Surgical History:   Procedure Laterality Date    HX CATARACT REMOVAL      right         Family History:   Problem Relation Age of Onset    Diabetes Paternal Grandmother     No Known Problems Mother     Diabetes Father     Asthma Sister     No Known Problems Brother        Social History     Socioeconomic History    Marital status:      Spouse name: Not on file    Number of children: Not on file    Years of education: Not on file    Highest education level: Not on file   Occupational History    Not on file   Social Needs    Financial resource strain: Not on file    Food insecurity:     Worry: Not on file     Inability: Not on file    Transportation needs:     Medical: Not on file     Non-medical: Not on file   Tobacco Use    Smoking status: Current Every Day Smoker     Packs/day: 1.00     Years: 10.00     Pack years: 10.00     Types: Cigarettes    Smokeless tobacco: Never Used   Substance and Sexual Activity    Alcohol use: No    Drug use: No    Sexual activity: Yes     Partners: Female   Lifestyle    Physical activity:     Days per week: Not on file     Minutes per session: Not on file    Stress: Not on file   Relationships    Social connections:     Talks on phone: Not on file     Gets together: Not on file     Attends Faith service: Not on file     Active member of club or organization: Not on file     Attends meetings of clubs or organizations: Not on file     Relationship status: Not on file    Intimate partner violence:     Fear of current or ex partner: Not on file     Emotionally abused: Not on file     Physically abused: Not on file     Forced sexual activity: Not on file   Other Topics Concern    Not on file   Social History Narrative    Not on file         ALLERGIES: Iodinated contrast- oral and iv dye    Review of Systems   Constitutional: Negative for activity change and appetite change. HENT: Negative for sore throat and trouble swallowing. Respiratory: Negative for cough and shortness of breath. Cardiovascular: Negative for chest pain, palpitations and leg swelling. Gastrointestinal: Negative for abdominal pain. Musculoskeletal: Negative for arthralgias and gait problem. Neurological: Negative for dizziness, light-headedness and headaches. All other systems reviewed and are negative. Vitals:    06/11/19 1206 06/11/19 1214   BP:  124/69   Pulse:  90   Resp: 15 16   Temp:  98.8 °F (37.1 °C)   SpO2: 99% 97%            Physical Exam   Constitutional: He is oriented to person, place, and time. He appears well-developed and well-nourished. Male; non smoker   HENT:   Staples intact to the left posterior parietal scalp; skin well approximated   Neck: Normal range of motion. Neck supple. Pulmonary/Chest: Effort normal and breath sounds normal.   Musculoskeletal: Normal range of motion. He exhibits no edema, tenderness or deformity. Lymphadenopathy:     He has no cervical adenopathy. Neurological: He is alert and oriented to person, place, and time. Skin: Skin is warm and dry. No rash noted. Psychiatric: He has a normal mood and affect. Nursing note and vitals reviewed. MDM       Procedures      Staples removed without difficulty from the left posterior parietal scalp by the RN. Good neurovascular sensation before and after the staple removal.  Reviewed skin recommendations with  Rl Rodrigues. Follow up with his PCP or neurologist  for further evaluation.  Use only unscented soaps and lotions. Frances James, NP

## 2019-06-11 NOTE — DISCHARGE INSTRUCTIONS
Patient Education        Learning About Stitches and Staples Removal  When are stitches and staples removed? Your doctor will tell you when to have your stitches or staples removed, usually in 7 to 14 days. How long you'll be told to wait will depend on things like where the wound is located, how big and how deep the wound is, and what your general health is like. Do not remove the stitches on your own. Stitches on the face are usually removed within a week. But stitches and staples on other areas of the body, such as on the back or belly or over a joint, may need to stay in place longer, often a week or two. Be sure to follow your doctor's instructions. How are stitches and staples removed? It usually doesn't hurt when the doctor removes the stitches or staples. You may feel a tug as each stitch or staple is removed. · You will either be seated or lying down. · To remove stitches, the doctor will use scissors to cut each of the knots and then pull the threads out. · To remove staples, the doctor will use a tool to take out the staples one at a time. · The area may still feel tender after the stitches or staples are gone. But it should feel better within a few minutes or up to a few hours. What can you expect after stitches and staples are removed? Depending on the type and location of the cut, you will have a scar. Scars usually fade over time. Keep the area clean, but you won't need a bandage. When should you call for help? Call your doctor now or seek immediate medical care if :  · You have new pain, or your pain gets worse. · You have trouble moving the area near the scar. · You have symptoms of infection, such as:  ? Increased pain, swelling, warmth, or redness around the scar. ? Red streaks leading from the scar. ? Pus draining from the scar. ? A fever. Watch closely for changes in your health, and be sure to contact your doctor if:  · The scar opens.   · You do not get better as expected. Follow-up care is a key part of your treatment and safety. Be sure to make and go to all appointments, and call your doctor if you do not get better as expected. It's also a good idea to keep a list of the medicines you take. Where can you learn more? Go to http://vishal-bertin.info/. Enter M101 in the search box to learn more about \"Learning About Stitches and Staples Removal.\"  Current as of: September 23, 2018  Content Version: 11.9  © 6842-6829 [employer]. Care instructions adapted under license by [employer] (which disclaims liability or warranty for this information). If you have questions about a medical condition or this instruction, always ask your healthcare professional. [address] any warranty or liability for your use of this information.

## 2019-06-11 NOTE — ED TRIAGE NOTES
Patient arrives from home with 5 staples in the left side of his head. He had them placed [facility] 6[facility]/10 after he fell at a department store when he was shopping for clothes. He states there was standing water and he did not notice it and fell.

## 2019-06-12 NOTE — PROGRESS NOTES
[facility] at [address] [facility] Update    Date:6/11/19    Arben Mata 1970    Medication: Cosentyx    [place] Approved: 6/5/19 - 6/4/20    Prescription transferred to specialty pharmacy: [facility]    Phone: [phone]    Pharmacist, Elda George, counseled the patient and informed patient their prescription was transferred to the mandated specialty pharmacy and gave patient the specialty pharmacy's phone number. Pharmacist answered any questions that the patient had.     Danie Castanon, [address] [place] [address] Pharmacy at [place] [address] Juliann Parker   [address] [place] [address]  phone: [phone]   fax: [phone]

## 2019-06-20 ENCOUNTER — TELEPHONE (OUTPATIENT)
Dept: RHEUMATOLOGY | Age: 49
End: 2019-06-20

## 2019-06-20 NOTE — TELEPHONE ENCOUNTER
Left message for pt stating that his cosentyx was approved from 6/5/19-6/4/20. Provided phone number to Charlie Baer for him to set up delivery or  medication.

## 2019-06-28 ENCOUNTER — OFFICE VISIT (OUTPATIENT)
Dept: RHEUMATOLOGY | Age: 49
End: 2019-06-28

## 2019-06-28 ENCOUNTER — DOCUMENTATION ONLY (OUTPATIENT)
Dept: RHEUMATOLOGY | Age: 49
End: 2019-06-28

## 2019-06-28 VITALS
RESPIRATION RATE: 18 BRPM | SYSTOLIC BLOOD PRESSURE: 108 MMHG | TEMPERATURE: 97.9 F | WEIGHT: 217 LBS | HEART RATE: 73 BPM | DIASTOLIC BLOOD PRESSURE: 77 MMHG | BODY MASS INDEX: 32.14 KG/M2 | HEIGHT: 69 IN

## 2019-06-28 DIAGNOSIS — Z79.1 LONG TERM CURRENT USE OF NON-STEROIDAL ANTI-INFLAMMATORIES (NSAID): ICD-10-CM

## 2019-06-28 DIAGNOSIS — L40.50 PSORIATIC ARTHRITIS (HCC): Primary | ICD-10-CM

## 2019-06-28 DIAGNOSIS — L40.9 PSORIASIS: ICD-10-CM

## 2019-06-28 DIAGNOSIS — M54.50 ACUTE LOW BACK PAIN DUE TO TRAUMA: ICD-10-CM

## 2019-06-28 DIAGNOSIS — M25.421 ELBOW JOINT EFFUSION, RIGHT: ICD-10-CM

## 2019-06-28 DIAGNOSIS — G89.11 ACUTE LOW BACK PAIN DUE TO TRAUMA: ICD-10-CM

## 2019-06-28 DIAGNOSIS — Z79.60 LONG-TERM USE OF IMMUNOSUPPRESSANT MEDICATION: ICD-10-CM

## 2019-06-28 RX ORDER — METHOTREXATE 2.5 MG/1
15 TABLET ORAL
Qty: 72 TAB | Refills: 0 | Status: SHIPPED | OUTPATIENT
Start: 2019-06-29 | End: 2019-08-29 | Stop reason: SDUPTHER

## 2019-06-28 RX ORDER — FOLIC ACID 1 MG/1
1 TABLET ORAL DAILY
Qty: 90 TAB | Refills: 0 | Status: SHIPPED | OUTPATIENT
Start: 2019-06-28 | End: 2019-08-29 | Stop reason: SDUPTHER

## 2019-06-28 NOTE — LETTER
6/28/19 Patient: Ervin Rubio YOB: 1970 Date of Visit: 6/28/2019 Fuentes Nice MD 
[address] 7 [address] VIA In Basket Dear Fuentes Nice MD, Thank you for referring Mr. Charline Zabala to [address] [place] [address] for evaluation. My notes for this consultation are attached. If you have questions, please do not hesitate to call me. I look forward to following your patient along with you.  
 
 
Sincerely, 
 
Connie Parker MD

## 2019-06-28 NOTE — PATIENT INSTRUCTIONS
If you do not feel better with your back pain, you need to go see your PCP    METHOTREXATE    Methotrexate is a weekly regimen that is supplemented with daily folic acid to help counteract potential side effects. Potential Adverse Affects    - Nausea  - Vomiting  - Upset stomach  - Oral ulcers  - Hair thinning  - Infection  - Liver function abnormalities  - Blood count abnormalities  - Rarely pneumonitis      Medication Monitoring    You will need routine blood counts and kidney and liver testing as a measure of monitoring for long term use of immunosuppressants. Things You Should Do    You should avoid ill contacts     You should get annual influenza vaccines and the pneumonia vaccines. You should apply SPF 50 sunscreen when out in the sun. You should avoid alcohol as it may hasten hepatotoxicity. You should avoid pregnancy due to risk for birth defects. You should contact me if you are sick. You should hold methotrexate if you are sick and resume after your sickness resolves. If you have any problems or side effects with this medication, please contact me immediately to inform me. Plan    I prescribed methotrexate 15 mg (6 tablets) every SATURDAY at bedtime with DAILY folic acid 1 mg. If the folic acid 1 mg is not covered, then you may take two tablets of the over the counter [employer] folic acid 118 mcg (total of 800 mcg daily). Methotrexate may take 6 to 12 weeks to be effective.

## 2019-06-28 NOTE — PROGRESS NOTES
REASON FOR VISIT    This is a follow-up visit for Mr. Christine Culver for Psoriatic Arthritis. Spondyloarthritis phenotype includes:  Anti-CCP positive: pending  Rheumatoid factor positive: pending  HLA-B27 positive: pending  Inflammatory Back Pain: no  Erosive disease: no  Sacroiliitis: no  Ankylosis: no  Psoriasis: yes  Enthesitis: no  Dactylitis: no  Nail Pitting: no  Onycholysis: no  Splinter Hemorrhage: no  Extra-articular manifestations include: none  SAPHO: no    Immunosuppression Screening (5/29/2019): Quantiferon TB: negative  PPD:  Not performed  Hepatitis B: negative  Hepatitis C: negative    Therapy History includes:  Current NSAIDs include: Celebrex 100 mg twice daily  Current DMARD include: Cosentyx 300 mg every 30 days (6/2018 to present)  Prior DMARD therapy includes: methotrexate 15 mg weekly (8/13/2014 to 5/27/2015; insurance coverage), sulfasalazine 1000 mg twice daily (6/27/2012 to 8/13/2014; 12/02/2014 to 500 mg twice daily;10/02/2014 to 12/02/2014), Enbrel, Humira, Remicade, Otezla 30 mg twice daily (5/2015 to 6/2018), Stelara, Cosentyx  The following DMARDs have been ineffective: sulfasalazine, methotrexate, Otezla, Enbrel, Humira, Remicade   The following DMARDs were stopped because of side effects: none    Active problems include:    Patient Active Problem List   Diagnosis Code    [facility] E11.9    Psoriasis L40.9    Psoriatic arthritis ([facility] L40.50    Hyperlipidemia E78.5    Long-term use of immunosuppressant medication Z79.899    PPD positive R76.11    Tobacco use Z72.0    Long term current use of non-steroidal anti-inflammatories (NSAID) Z79.1    Primary osteoarthritis of both knees M17.0     HISTORY OF PRESENT ILLNESS    Mr. Christine Culver returns for a follow-up visit. On his last visit, I ordered labs and radiographs and refilled Cosentyx, which was approved from 6/05/2019 to 6/04/2020. I also refilled his Celebrex. His last dose of Cosentyx was a month ago.  He has breakthrough 5 to 10 days before his next dose. Today, he reports that on 6/01/2019, he slipped and fell and injured his head suffering a laceration and was unconscious. He regained consciousness. He then developed neck, shoulder, and lower back pain. This pain has become excruciating. Celebrex is not helping. He has pain in his left IP, 2nd DIP and 3rd DIP. It is a burning pain. This is usual for him as he has breakthrough. He denies swelling. He continues to have right elbow pain. I  Had referred him to orthopedics, Dr. Elizabeth Calderón for evaluation and treatment but he did not go. The psoriasis is good. He endorses interval fall. He denies fever, weight loss, blurred vision, vision loss, oral ulcers, dry cough, dyspnea, nausea, vomiting, abdominal pain, black or bloody stool, rash, easy bruising and increased thirst.    Mr. Meli Roberts has continued his medications for arthritis and reports good tolerance without significant side effects. The patient has not had any interval hospital admissions, infections, or surgeries. REVIEW OF SYSTEMS    A comprehensive review of systems was performed and pertinent results are documented in the HPI, review of systems is otherwise non-contributory. PAST MEDICAL HISTORY    He has a past medical history of Arthritis, Diabetes ([facility] (5/27/2009), Elevated liver enzymes, Hypercholesterolemia, Psoriasis (5/27/2009), and Psoriatic arthritis ([facility] (5/27/2009). FAMILY HISTORY    His family history includes Asthma in his sister; Diabetes in his father and paternal grandmother; No Known Problems in his brother and mother. SOCIAL HISTORY    He reports that he has been smoking cigarettes. He has a 10.00 pack-year smoking history. He has never used smokeless tobacco. He reports that he does not drink alcohol or use drugs.     IMMUNIZATIONS  Immunization History   Administered Date(s) Administered    H1N1 Influenza Virus Vaccine 10/27/2009    Influenza Vaccine Split 10/10/2012, 10/19/2012    Pneumococcal Polysaccharide (PPSV-23) 11/11/2015       MEDICATIONS    Current Outpatient Medications   Medication Sig Dispense Refill    [START ON 6/29/2019] methotrexate (RHEUMATREX) 2.5 mg tablet Take 6 Tabs by mouth Every Saturday. 72 Tab 0    folic acid (FOLVITE) 1 mg tablet Take 1 Tab by mouth daily. 90 Tab 0    glimepiride (AMARYL) 2 mg tablet Take  by mouth every morning.  SITagliptin (JANUVIA) 100 mg tablet Take 100 mg by mouth daily.  celecoxib (CELEBREX) 200 mg capsule Take 1 Cap by mouth two (2) times a day. 360 Cap 2    clobetasol (TEMOVATE) 0.05 % ointment Apply  to affected area two (2) times a day.  glucose blood VI test strips (ASCENSIA AUTODISC VI, ONE TOUCH ULTRA TEST VI) strip Twice a day 1 Package 12    Lancets Misc Bid as directed. 2 Package 12    secukinumab (COSENTYX) 150 mg/mL syrg 300 mg by SubCUTAneous route every month. Indications: Psoriasis associated with Arthritis 6 Syringe 11    FARXIGA 5 mg tab tablet TAKE 1 TABLET BY MOUTH EVERY DAY 30 Tab 5        ALLERGIES    Allergies   Allergen Reactions    Iodinated Contrast- Oral And Iv Dye Shortness of Breath       PHYSICAL EXAMINATION    Visit Vitals  /77   Pulse 73   Temp 97.9 °F (36.6 °C)   Resp 18   Ht 5' 9\" (1.753 m)   Wt 217 lb (98.4 kg)   BMI 32.05 kg/m²     Body mass index is 32.05 kg/m². General: Patient is alert, oriented x 3, not in acute distress    HEENT:   Sclerae are not injected and appear moist.  There is no alopecia.       Chest:  Breathing comfortably at room air    Extremities:  Free of clubbing, cyanosis, edema    Neurological exam:  Muscle strength is full in upper and lower extremities     Skin:    Psoriasis:     Yes (small patch posterior Achilles)  Nail Pitting:     no  Onycholysis:     no  Splinter Hemorrhage:   no  Palmoplantar pustulosis:   no  Acne fulminans:    no  Acne conglobata:    no  Hidradenitis Suppurativa:   no  Dissecting cellulitis of the scalp: no  Pilonidal sinus:    no  Erythema nodosum:    no    Musculoskeletal:  A comprehensive musculoskeletal exam was NOT performed for all joints of each upper and lower extremity and assessed for swelling, tenderness and range of motion. Focused hand exam revealed tenderness of the right 3rd DIP, LEFT IP, 2nd and 3rd IP  Lumbar spine exam noted left paraspinal muscular tenderness. No vertebral process tenderness    Previous Exam    Right elbow flexion deformity  Bilateral knee crepitus with some decrease ROM of the right knee     Costochondritis:  no   Synovitis:   yes (see table. Bilateral MTPs. DIP LEFT: 2nd; RIGHT: 3rd)  Dactylitis:   no  Enthesitis:   no    Joint Count 5/29/2019   Left thumb IP - Tender 1   Left thumb IP - Swollen 0   Left 2nd PIP - Tender 1   Left 2nd PIP - Swollen 0   Left 4th PIP - Tender 1   Left 4th PIP - Swollen 0   Left 5th PIP - Tender 1   Left 5th PIP - Swollen 0   Right elbow - Tender 1   Right elbow - Swollen 1   Tender Joint Count (Total) 5   Swollen Joint Count (Total) 1     DATA REVIEW    Laboratory     Recent laboratory results were reviewed, summarized, and discussed with the patient. Imaging    Musculoskeletal Ultrasound    None    Radiographs    Bilateral Hand 5/29/2019: LEFT: No fracture or dislocation on plain film. No joint space narrowing. No joint space erosion or periosteal reaction. Alignment is within normal limits. Bone mineralization is decreased. No soft tissue calcification. RIGHT: No fracture or dislocation on plain film. No joint space narrowing. There is no joint space erosion. There is subtle periostitis distal phalanx of the middle finger. Alignment is within normal limits. Bone mineralization is decreased. No soft tissue calcification. Chest 3/30/2019: Cardiac monitoring wires overlie the thorax. The cardiomediastinal and hilar contours are within normal limits. The pulmonary vasculature is within normal limits.   The lungs and pleural spaces are clear. The visualized bones and upper abdomen are age-appropriate. Right Elbow 12/15/2014: a moderate right elbow effusion.  A nondisplaced fracture of the radial head is suspected. No dislocation is shown. CT Imaging    CT Head without contrast 9/10/2010: normal ventricles and basal cisterns. No intracranial masses or hemorrhages are seen. No focal intraparenchymal low density abnormalities are seen. MR Imaging    MRI Right Elbow without contrast 12/22/2014: there is a large complex appearing elbow joint effusion. There is  diffuse chondral thinning. Mild diffuse osseous edema is shown. The elbow collateral ligaments are intact. No tendon derangement is demonstrated. No soft tissue mass is shown. DXA     None    ASSESSMENT AND PLAN    This is a follow-up visit for Mr. Choco Garcia. 1) Psoriatic Arthritis. (psoriasis, arthritis) He is maintained on Cosentyx 300 mg monthly with great improvement of his psoriasis but he continues to have joint pain in his right elbow, fingers, and knees. He had a complex right elbow effusion noted on MRI in 12/22/2014 therefore, he may need surgical intervention to debride it, since it continues to be symptomatic. I had referred him to orthopedics, Dr. Socorro Townsend for evaluation and treatment, which he has not done, so I asked him to do so. He reports breakthrough from Cosentyx 5 to 10 before his next dose, in his DIPs. We discussed initiation of DMARD therapy with methotrexate. It is a weekly regimen that is supplemented with daily folic acid to help counteract potential side effects. I discussed with the patient the potential adverse effects, which include: nausea, vomiting, dyspepsia, oral ulcers, hair thinning, infection, liver function abnormalities, blood count abnormalities, and rarely pneumonitis or melanoma. The patient understood these possible adverse effects.  I informed the patient of the need for routine CBC and CMP as a measure for long term use of immunosuppressants. I also instructed the patient to avoid ill contacts and the needs for annual influenza vaccines and the pneumonia vaccines. I asked the patient to apply SPF 50 sunscreen when out in the sun. The patient was also instructed to avoid alcohol as it may hasten hepatotoxicity. In childbearing patients, pregnancy is contra-indicated while on methotrexate due to risk for birth defects and early termination. I prescribed methotrexate 15 mg every Saturday with daily folic acid 1 mg. I informed the patient that methotrexate may take 6 to 12 weeks to be effective. Patient was informed to contact me if they develop any adverse reaction or infection. I will give him a 3 murphy trial on it, if no improvement, I may need to transition him to [employer] Labs today. 2) Long Term Use of Immunosuppressants. The patient remains on immunomodulatory medications (Cosentyx) and requires frequent toxicity monitoring by blood work. 3) Bilateral Knee Osteoarthritis. I referred him to physical therapy. He declined injections. 4) Long Term Use of NSAIDs. The patient remains on Celebrex. His most recent renal function was normal. I will repeat today. 5) Tobacco Use. He smokes 0.5 PPD. I counseled smoking cessation due to worsening prognosis of his PsA. 6) Acute Low Back Pain s/p Fall. The pain is not along the vertebral column to suggest fracture. It appears more muscular in nature. I referred him to physical therapy and recommended he follow up with his PCP if he has no improvement. The patient voiced understanding of the aforementioned assessment and plan. Summary of plan was provided in the After Visit Summary patient instructions.      TODAY'S ORDERS    Orders Placed This Encounter    METABOLIC PANEL, COMPREHENSIVE    CBC WITH AUTOMATED DIFF    CYCLIC CITRUL PEPTIDE AB, IGG    RHEUMATOID FACTOR, QL    HLA-B27    REFERRAL TO PHYSICAL THERAPY    methotrexate (RHEUMATREX) 2.5 mg tablet    folic acid Linus Garcia) 1 mg tablet     Future Appointments   Date Time Provider Gale Brewer   7/9/2019  4:00 PM Jocelynn Krishna., PT [facility]   8/29/2019  2:20 PM Sukumar Thakkar MD [facility]   12/2/2019  1:40 PM MD Carine Murray MD, [address] [facility]    Adult Rheumatology   Rheumatology Ultrasound Certified  [address] [facility] [address] [facility] [place], [address] [place] [address]   Phone [phone]  Fax [phone]

## 2019-06-28 NOTE — PROGRESS NOTES
Chief Complaint   Patient presents with    Arthritis     1. Have you been to the ER, urgent care clinic since your last visit? Hospitalized since your last visit? Yes [facility] for a fall    2. Have you seen or consulted any other health care providers outside of the [address] [place] [address] since your last visit? Include any pap smears or colon screening.  No

## 2019-07-01 DIAGNOSIS — L40.50 PSORIATIC ARTHRITIS (HCC): ICD-10-CM

## 2019-07-02 LAB
ALBUMIN SERPL-MCNC: 4.2 G/DL (ref 3.5–5.5)
ALBUMIN/GLOB SERPL: 1.7 {RATIO} (ref 1.2–2.2)
ALP SERPL-CCNC: 112 IU/L (ref 39–117)
ALT SERPL-CCNC: 21 IU/L (ref 0–44)
AST SERPL-CCNC: 16 IU/L (ref 0–40)
BASOPHILS # BLD AUTO: 0 X10E3/UL (ref 0–0.2)
BASOPHILS NFR BLD AUTO: 0 %
BILIRUB SERPL-MCNC: 0.3 MG/DL (ref 0–1.2)
BUN SERPL-MCNC: 21 MG/DL (ref 6–24)
BUN/CREAT SERPL: 24 (ref 9–20)
CALCIUM SERPL-MCNC: 9.2 MG/DL (ref 8.7–10.2)
CCP IGA+IGG SERPL IA-ACNC: 4 UNITS (ref 0–19)
CHLORIDE SERPL-SCNC: 104 MMOL/L (ref 96–106)
CO2 SERPL-SCNC: 21 MMOL/L (ref 20–29)
CREAT SERPL-MCNC: 0.87 MG/DL (ref 0.76–1.27)
EOSINOPHIL # BLD AUTO: 0.2 X10E3/UL (ref 0–0.4)
EOSINOPHIL NFR BLD AUTO: 3 %
ERYTHROCYTE [DISTWIDTH] IN BLOOD BY AUTOMATED COUNT: 12.5 % (ref 12.3–15.4)
GLOBULIN SER CALC-MCNC: 2.5 G/DL (ref 1.5–4.5)
GLUCOSE SERPL-MCNC: 190 MG/DL (ref 65–99)
HCT VFR BLD AUTO: 44.1 % (ref 37.5–51)
HGB BLD-MCNC: 14.5 G/DL (ref 13–17.7)
HLA-B27 QL NAA+PROBE: NEGATIVE
IMM GRANULOCYTES # BLD AUTO: 0 X10E3/UL (ref 0–0.1)
IMM GRANULOCYTES NFR BLD AUTO: 0 %
LYMPHOCYTES # BLD AUTO: 2.5 X10E3/UL (ref 0.7–3.1)
LYMPHOCYTES NFR BLD AUTO: 30 %
MCH RBC QN AUTO: 27.9 PG (ref 26.6–33)
MCHC RBC AUTO-ENTMCNC: 32.9 G/DL (ref 31.5–35.7)
MCV RBC AUTO: 85 FL (ref 79–97)
MONOCYTES # BLD AUTO: 0.8 X10E3/UL (ref 0.1–0.9)
MONOCYTES NFR BLD AUTO: 10 %
NEUTROPHILS # BLD AUTO: 4.6 X10E3/UL (ref 1.4–7)
NEUTROPHILS NFR BLD AUTO: 57 %
PLATELET # BLD AUTO: 217 X10E3/UL (ref 150–450)
POTASSIUM SERPL-SCNC: 3.9 MMOL/L (ref 3.5–5.2)
PROT SERPL-MCNC: 6.7 G/DL (ref 6–8.5)
RBC # BLD AUTO: 5.2 X10E6/UL (ref 4.14–5.8)
RHEUMATOID FACT SERPL-ACNC: <10 IU/ML (ref 0–13.9)
SODIUM SERPL-SCNC: 139 MMOL/L (ref 134–144)
WBC # BLD AUTO: 8.2 X10E3/UL (ref 3.4–10.8)

## 2019-07-02 NOTE — PROGRESS NOTES
The results were reviewed. Labs are negative for anti-CCP, rheumatoid factor, HLA-B27. Remaining labs are good.

## 2019-07-09 ENCOUNTER — APPOINTMENT (OUTPATIENT)
Dept: PHYSICAL THERAPY | Age: 49
End: 2019-07-09

## 2019-07-10 RX ORDER — CELECOXIB 100 MG/1
100 CAPSULE ORAL 2 TIMES DAILY
Qty: 60 CAP | Refills: 3 | Status: SHIPPED | OUTPATIENT
Start: 2019-07-10 | End: 2019-08-09

## 2019-07-10 NOTE — TELEPHONE ENCOUNTER
Celebrex 200 mg has been denied by [employer]. Dr. Manjula Pereira will give new prescription for Celebrex 100 mg twice a day.

## 2019-07-12 ENCOUNTER — DOCUMENTATION ONLY (OUTPATIENT)
Dept: RHEUMATOLOGY | Age: 49
End: 2019-07-12

## 2019-07-12 NOTE — PROGRESS NOTES
Faxed prior auth request to Cover [employer] Med[employer] 7/11/2019 for Celebrex 100 mg. Approved until 7/10/2020, [place] Case # [id number].

## 2019-07-17 ENCOUNTER — HOSPITAL ENCOUNTER (OUTPATIENT)
Dept: PHYSICAL THERAPY | Age: 49
Discharge: HOME | End: 2019-07-17
Payer: MEDICAID

## 2019-07-17 PROCEDURE — 97110 THERAPEUTIC EXERCISES: CPT | Performed by: PHYSICAL THERAPIST

## 2019-07-17 PROCEDURE — 97161 PT EVAL LOW COMPLEX 20 MIN: CPT | Performed by: PHYSICAL THERAPIST

## 2019-07-17 NOTE — PROGRESS NOTES
PT INITIAL EVALUATION NOTE - [facility] 2-15    Patient Name: Armando Campos  Date:2019  : 1970  [x]  Patient  Verified  Payor: Alexandre Goyal / Plan: Pedro Christian / Product Type: HMO /    In time:110 P   Out time:200 P   Total Treatment Time (min): 50 (35 eval, 15 timed see below)  Total Timed Codes (min): 15   Visit #:1    Treatment Area: Low back pain [M54.5]    SUBJECTIVE  Any medication changes, allergies to medications, adverse drug reactions, diagnosis change, or new procedure performed?: [] No    [x] Yes (see summary sheet for update)  Date of onset/injury:   Pt with back pain longstanding  Worsened when pt fell in April of this year because he landed on his back  Has been worsening since then  Pain:   10/10 max 4/10 min 8/10 now     Location of symptoms: low back, juliet knees  Description of symptoms: burning  Aggravated by: walking, bending down, sleeping, unable to lift heavy objects around house  Eased by: meds  Prior tests/injections:no recent xrays/MRIs of lumbar spine  PMH: brain injury in April after fall, psoriatic arthritis  Any weakness in LE's: yes  Any tingling/numbness in LE's: none  Recent weight/loss or weight gain: none  Prior tx: for his shoulder a few years ago  Occupation: Not working much secondary to head injury. 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   Social: Lives with his family  Prior level of function/activity level: Sedentary  Patient goal: \"to relax and show some ways to relax\"    OBJECTIVE    Observation: increased lumbar ext in standing, forward trunk lean  Lumbar shift  [] (L)   [x] (R)  Lordosis   [] Inc    [x] Dec  Leg length discrepancy secondary to knee flexion contracture on (R)     Gait: pt slightly sidebent to the right throughout secondary to (R) lumbar shift and (R) knee flexion contracture    AROM lumbar spine alfaro all planes 20%, pain with all directions    AROM (R) knee: -23 deg to 90 deg  AROM (L) knee: -15 deg to 115 deg    Strength  *5/5 unless noted below L(0-5) R (0-5)   Hip Flexion (L1,2)     Knee Extension (L3,4)     Knee Flexion (S1,2)     Ankle Dorsiflexion (L4)     Great Toe Extension (L5)     Ankle Plantarflexion (S1)     Ankle Eversion (S1)     Lower Abdominals 2p! 2p! Paraspinals 2p! 2p! Hip Extensors 3p! 3p! Hip Abductors 3+p! 3+ p! Hip ER's     Hip IR's       Poor TrA activation  Poor PPT  Partial bridge only secondary to pain    Tenderness to palpation: juliet lumbar paraspinals, glute max, glute med, juliet quads    Special Tests: lumbar distraction improves symptoms  Unable to adequately assess results of juliet knee special tests as juliet knees very sensitive and tender    Bed mobility: Pt with difficulty noted when transitioning sit to supine, rolling, supine to sit, and from sit to stand. Pain noted with transfers    Flexibility: sig dec juliet quads, HS, piriformis, gastroc (R>L)    Joint mobility:    Hypomobility noted throughout lumbar spine L1-L5, pain with testing at all segments. Hypomobility noted tibial posterior glides       OBJECTIVE  [x] Skin assessment post-treatment:  [x]intact []redness- no adverse reaction    []redness - adverse reaction:     15 min Therapeutic Exercise:  [x] See flow sheet :   Rationale: increase ROM and increase strength to improve the patients ability to walk and bend without pain            With   [x] TE   [] manual   [] self care    Patient Education: [x] Review HEP    [] Progressed/Changed HEP based on:   [] positioning   [] body mechanics   [] transfers   [x] Ice application- pt advised to ice 10-15 min 1-2 x/day to area in order to dec inflammation  [x] other:  re: mechanism of injury/condition, role of physical therapy, prognosis for recovery, heat vs ice, activity modifications. Education re: proper posture in relation to low back pain. Demonstrated using visual and tactile cues. Pain Level (0-10 scale) post treatment: 8    ASSESSMENT/Changes in Function:     [x]  See Plan of Cristofer. Nora PT, DPT, TATUMPT  PT License Number: [id number]   7/17/2019  1:05 PM

## 2019-07-17 NOTE — PROGRESS NOTES
[facility]  [address]  [place], [address] [facility]  Phone: [phone]  Fax: [phone]    Plan of Care/Statement of Necessity for Physical Therapy Services  2-15    Patient name: Kasie Fierro  : 1970  Provider#: [id number]  Referral source: Carrie Mueller MD      Medical/Treatment Diagnosis: Low back pain [M54.5]     Prior Hospitalization: see medical history     Comorbidities: see evaluation  Prior Level of Function:see evaluation  Medications: Verified on Patient Summary List  Start of Care: 2019     Onset Date:see evaluation   The Plan of Care and following information is based on the information from the initial evaluation. Assessment/ key information: Patient presents with signs and symptoms consistent with lumbar strain with juliet knee flexion contractures and will benefit from physical therapy to address deficits noted below in problem list.     Problem List: pain affecting function, decrease ROM, decrease strength, impaired gait/ balance, decrease ADL/ functional abilitiies, decrease activity tolerance, decrease flexibility/ joint mobility and decrease transfer abilities   Treatment Plan may include any combination of the following: Therapeutic exercise, Therapeutic activities, Neuromuscular re-education, Physical agent/modality, Manual therapy, Patient education and Self Care training  Patient / Family readiness to learn indicated by: asking questions, trying to perform skills and interest  Persons(s) to be included in education: patient (P)  Barriers to Learning/Limitations: None  Patient Goal (s): please see evaluation in Connect [facility]  Patient Self Reported Health Status: please see paper chart  Rehabilitation Potential: good    Short Term Goals:  To be accomplished in 5 treatments:  -Independent in HEP as evidenced on ability to perform at least 5 exercises from HEP using proper form without verbal cuing.   -Pain less than or equal to 7/10 at worst to allow patient to perform ADL's with greater ease  -Demostrate proper posture in order to decrease lumbar and knee pain  -Pt will report compliance with icing 1-2x/day in order to decrease inflammation    Long Term Goals: To be accomplished in 10 treatments:  -AROM lumbar flexion WNL and pain-free to allow pt to bend over to do housework without pain  -Full bridge x 30 in order to allow pt to walk and climb stairs with greater ease.  -Pain 0-5/10 to allow patient to perform basic bed mobility and table transfers without pain    Frequency / Duration: Patient to be seen 1-2 times per week for 10-12 weeks. Patient/ Caregiver education and instruction: self care, activity modification and exercises    [x]  Plan of care has been reviewed with PTA    Certification Period: 7/17/2019 -  10/15/19    Elizabeth Melendez, PT, DPT, CMTPT      1/28/2608 8:17 PM  PT License Number: [id number]  _____________________________________________________________________    I certify that the above Therapy Services are being furnished while the patient is under my care. I agree with the treatment plan and certify that this therapy is necessary.     [de-identified] Signature:____________________  Date:____________Time:_________

## 2019-07-20 ENCOUNTER — APPOINTMENT (OUTPATIENT)
Dept: GENERAL RADIOLOGY | Age: 49
End: 2019-07-20
Attending: EMERGENCY MEDICINE
Payer: MEDICAID

## 2019-07-20 ENCOUNTER — HOSPITAL ENCOUNTER (EMERGENCY)
Age: 49
Discharge: HOME | End: 2019-07-20
Attending: EMERGENCY MEDICINE
Payer: MEDICAID

## 2019-07-20 VITALS
SYSTOLIC BLOOD PRESSURE: 91 MMHG | TEMPERATURE: 98.1 F | RESPIRATION RATE: 16 BRPM | OXYGEN SATURATION: 95 % | DIASTOLIC BLOOD PRESSURE: 58 MMHG | HEART RATE: 76 BPM

## 2019-07-20 DIAGNOSIS — R19.7 DIARRHEA, UNSPECIFIED TYPE: Primary | ICD-10-CM

## 2019-07-20 LAB
ALBUMIN SERPL-MCNC: 3.7 G/DL (ref 3.5–5)
ALBUMIN/GLOB SERPL: 1 {RATIO} (ref 1.1–2.2)
ALP SERPL-CCNC: 140 U/L (ref 45–117)
ALT SERPL-CCNC: 27 U/L (ref 12–78)
AMYLASE SERPL-CCNC: 60 U/L (ref 25–115)
ANION GAP SERPL CALC-SCNC: 7 MMOL/L (ref 5–15)
APPEARANCE UR: CLEAR
AST SERPL-CCNC: 15 U/L (ref 15–37)
BACTERIA URNS QL MICRO: NEGATIVE /HPF
BASOPHILS # BLD: 0 K/UL (ref 0–0.1)
BASOPHILS NFR BLD: 0 % (ref 0–1)
BILIRUB SERPL-MCNC: 0.5 MG/DL (ref 0.2–1)
BILIRUB UR QL: NEGATIVE
BUN SERPL-MCNC: 13 MG/DL (ref 6–20)
BUN/CREAT SERPL: 14 (ref 12–20)
CALCIUM SERPL-MCNC: 9.2 MG/DL (ref 8.5–10.1)
CHLORIDE SERPL-SCNC: 105 MMOL/L (ref 97–108)
CO2 SERPL-SCNC: 25 MMOL/L (ref 21–32)
COLOR UR: ABNORMAL
COMMENT, HOLDF: NORMAL
CREAT SERPL-MCNC: 0.92 MG/DL (ref 0.7–1.3)
DIFFERENTIAL METHOD BLD: NORMAL
EOSINOPHIL # BLD: 0.1 K/UL (ref 0–0.4)
EOSINOPHIL NFR BLD: 2 % (ref 0–7)
EPITH CASTS URNS QL MICRO: ABNORMAL /LPF
ERYTHROCYTE [DISTWIDTH] IN BLOOD BY AUTOMATED COUNT: 13.1 % (ref 11.5–14.5)
GLOBULIN SER CALC-MCNC: 3.8 G/DL (ref 2–4)
GLUCOSE SERPL-MCNC: 199 MG/DL (ref 65–100)
GLUCOSE UR STRIP.AUTO-MCNC: >1000 MG/DL
HCT VFR BLD AUTO: 47.2 % (ref 36.6–50.3)
HEMOCCULT STL QL: NEGATIVE
HGB BLD-MCNC: 15.6 G/DL (ref 12.1–17)
HGB UR QL STRIP: NEGATIVE
HYALINE CASTS URNS QL MICRO: ABNORMAL /LPF (ref 0–5)
IMM GRANULOCYTES # BLD AUTO: 0 K/UL (ref 0–0.04)
IMM GRANULOCYTES NFR BLD AUTO: 0 % (ref 0–0.5)
KETONES UR QL STRIP.AUTO: NEGATIVE MG/DL
LEUKOCYTE ESTERASE UR QL STRIP.AUTO: NEGATIVE
LIPASE SERPL-CCNC: 186 U/L (ref 73–393)
LYMPHOCYTES # BLD: 1.4 K/UL (ref 0.8–3.5)
LYMPHOCYTES NFR BLD: 25 % (ref 12–49)
MCH RBC QN AUTO: 28.3 PG (ref 26–34)
MCHC RBC AUTO-ENTMCNC: 33.1 G/DL (ref 30–36.5)
MCV RBC AUTO: 85.7 FL (ref 80–99)
MONOCYTES # BLD: 0.6 K/UL (ref 0–1)
MONOCYTES NFR BLD: 11 % (ref 5–13)
NEUTS SEG # BLD: 3.4 K/UL (ref 1.8–8)
NEUTS SEG NFR BLD: 62 % (ref 32–75)
NITRITE UR QL STRIP.AUTO: NEGATIVE
NRBC # BLD: 0 K/UL (ref 0–0.01)
NRBC BLD-RTO: 0 PER 100 WBC
PH UR STRIP: 5 [PH] (ref 5–8)
PLATELET # BLD AUTO: 166 K/UL (ref 150–400)
PMV BLD AUTO: 9.4 FL (ref 8.9–12.9)
POTASSIUM SERPL-SCNC: 3.7 MMOL/L (ref 3.5–5.1)
PROT SERPL-MCNC: 7.5 G/DL (ref 6.4–8.2)
PROT UR STRIP-MCNC: NEGATIVE MG/DL
RBC # BLD AUTO: 5.51 M/UL (ref 4.1–5.7)
RBC #/AREA URNS HPF: ABNORMAL /HPF (ref 0–5)
SAMPLES BEING HELD,HOLD: NORMAL
SODIUM SERPL-SCNC: 137 MMOL/L (ref 136–145)
SP GR UR REFRACTOMETRY: 1.03 (ref 1–1.03)
UA: UC IF INDICATED,UAUC: ABNORMAL
UROBILINOGEN UR QL STRIP.AUTO: 0.2 EU/DL (ref 0.2–1)
WBC # BLD AUTO: 5.5 K/UL (ref 4.1–11.1)
WBC #/AREA STL HPF: NORMAL /HPF (ref 0–4)
WBC URNS QL MICRO: ABNORMAL /HPF (ref 0–4)

## 2019-07-20 PROCEDURE — 87147 CULTURE TYPE IMMUNOLOGIC: CPT

## 2019-07-20 PROCEDURE — 99283 EMERGENCY DEPT VISIT LOW MDM: CPT

## 2019-07-20 PROCEDURE — 87077 CULTURE AEROBIC IDENTIFY: CPT

## 2019-07-20 PROCEDURE — 36415 COLL VENOUS BLD VENIPUNCTURE: CPT

## 2019-07-20 PROCEDURE — 82150 ASSAY OF AMYLASE: CPT

## 2019-07-20 PROCEDURE — 89055 LEUKOCYTE ASSESSMENT FECAL: CPT

## 2019-07-20 PROCEDURE — 81001 URINALYSIS AUTO W/SCOPE: CPT

## 2019-07-20 PROCEDURE — 96374 THER/PROPH/DIAG INJ IV PUSH: CPT

## 2019-07-20 PROCEDURE — 83690 ASSAY OF LIPASE: CPT

## 2019-07-20 PROCEDURE — 87186 SC STD MICRODIL/AGAR DIL: CPT

## 2019-07-20 PROCEDURE — 74022 RADEX COMPL AQT ABD SERIES: CPT

## 2019-07-20 PROCEDURE — 87045 FECES CULTURE AEROBIC BACT: CPT

## 2019-07-20 PROCEDURE — 96361 HYDRATE IV INFUSION ADD-ON: CPT

## 2019-07-20 PROCEDURE — 74011250636 HC RX REV CODE- 250/636: Performed by: EMERGENCY MEDICINE

## 2019-07-20 PROCEDURE — 82272 OCCULT BLD FECES 1-3 TESTS: CPT

## 2019-07-20 PROCEDURE — 80053 COMPREHEN METABOLIC PANEL: CPT

## 2019-07-20 PROCEDURE — 85025 COMPLETE CBC W/AUTO DIFF WBC: CPT

## 2019-07-20 PROCEDURE — 96375 TX/PRO/DX INJ NEW DRUG ADDON: CPT

## 2019-07-20 RX ORDER — KETOROLAC TROMETHAMINE 30 MG/ML
30 INJECTION, SOLUTION INTRAMUSCULAR; INTRAVENOUS
Status: COMPLETED | OUTPATIENT
Start: 2019-07-20 | End: 2019-07-20

## 2019-07-20 RX ORDER — DICYCLOMINE HYDROCHLORIDE 20 MG/1
20 TABLET ORAL EVERY 6 HOURS
Qty: 20 TAB | Refills: 0 | Status: SHIPPED | OUTPATIENT
Start: 2019-07-20 | End: 2019-07-25

## 2019-07-20 RX ORDER — METRONIDAZOLE 500 MG/1
500 TABLET ORAL 2 TIMES DAILY
Qty: 20 TAB | Refills: 0 | Status: SHIPPED | OUTPATIENT
Start: 2019-07-20 | End: 2019-07-30

## 2019-07-20 RX ORDER — DIPHENOXYLATE HYDROCHLORIDE AND ATROPINE SULFATE 2.5; .025 MG/1; MG/1
1 TABLET ORAL
Qty: 20 TAB | Refills: 0 | Status: SHIPPED | OUTPATIENT
Start: 2019-07-20 | End: 2019-11-26

## 2019-07-20 RX ORDER — ONDANSETRON 2 MG/ML
8 INJECTION INTRAMUSCULAR; INTRAVENOUS
Status: COMPLETED | OUTPATIENT
Start: 2019-07-20 | End: 2019-07-20

## 2019-07-20 RX ADMIN — ONDANSETRON 8 MG: 2 INJECTION INTRAMUSCULAR; INTRAVENOUS at 06:30

## 2019-07-20 RX ADMIN — KETOROLAC TROMETHAMINE 30 MG: 30 INJECTION, SOLUTION INTRAMUSCULAR at 06:30

## 2019-07-20 RX ADMIN — SODIUM CHLORIDE 1000 ML: 900 INJECTION, SOLUTION INTRAVENOUS at 06:32

## 2019-07-20 NOTE — DISCHARGE INSTRUCTIONS

## 2019-07-20 NOTE — ED PROVIDER NOTES
The patient is a 81-year-old male with a past medical history significant for arthritis, diabetes, abnormal LFTs, hypercholesterolemia, psoriasis who presents to the ED with a complaint of abdominal cramps accompanied by watery nonbloody diarrhea over the past 6 to 8 hours. The patient describes a dull, throbbing and cramping discomfort, severity 5 out of 10, without any aggravating or relieving factors and nonradiating.   He denies any fever, sore throat, cough, congestion, headache, neck pain, back pain, chest pain or shortness of breath, dysuria, extremity weakness numbness, sick contacts, unusual food sources, recent travel, prior history of the same           Past Medical History:   Diagnosis Date    Arthritis     Diabetes ([facility] [address] 5/27/2009    Elevated liver enzymes     resolved     Hypercholesterolemia     Psoriasis 5/27/2009    Psoriatic arthritis ([facility] 5/27/2009       Past Surgical History:   Procedure Laterality Date    HX CATARACT REMOVAL      right         Family History:   Problem Relation Age of Onset    Diabetes Paternal Grandmother     No Known Problems Mother     Diabetes Father     Asthma Sister     No Known Problems Brother        Social History     Socioeconomic History    Marital status:      Spouse name: Not on file    Number of children: Not on file    Years of education: Not on file    Highest education level: Not on file   Occupational History    Not on file   Social Needs    Financial resource strain: Not on file    Food insecurity:     Worry: Not on file     Inability: Not on file    Transportation needs:     Medical: Not on file     Non-medical: Not on file   Tobacco Use    Smoking status: Current Every Day Smoker     Packs/day: 1.00     Years: 10.00     Pack years: 10.00     Types: Cigarettes    Smokeless tobacco: Never Used   Substance and Sexual Activity    Alcohol use: No    Drug use: No    Sexual activity: Yes     Partners: Female   Lifestyle    Physical activity:     Days per week: Not on file     Minutes per session: Not on file    Stress: Not on file   Relationships    Social connections:     Talks on phone: Not on file     Gets together: Not on file     Attends Jewish service: Not on file     Active member of club or organization: Not on file     Attends meetings of clubs or organizations: Not on file     Relationship status: Not on file    Intimate partner violence:     Fear of current or ex partner: Not on file     Emotionally abused: Not on file     Physically abused: Not on file     Forced sexual activity: Not on file   Other Topics Concern    Not on file   Social History Narrative    Not on file         ALLERGIES: Iodinated contrast- oral and iv dye    Review of Systems   All other systems reviewed and are negative. Vitals:    07/20/19 0503   BP: 120/80   Pulse: 83   Resp: 18   Temp: 98.1 °F (36.7 °C)   SpO2: 94%            Physical Exam   Nursing note and vitals reviewed. CONSTITUTIONAL: Well-appearing; well-nourished; in no apparent distress  HEAD: Normocephalic; atraumatic  EYES: PERRL; EOM intact; conjunctiva and sclera are clear bilaterally. ENT: No rhinorrhea; normal pharynx with no tonsillar hypertrophy; mucous membranes pink/moist, no erythema, no exudate. NECK: Supple; non-tender; no cervical lymphadenopathy  CARD: Normal S1, S2; no murmurs, rubs, or gallops. Regular rate and rhythm. RESP: Normal respiratory effort; breath sounds clear and equal bilaterally; no wheezes, rhonchi, or rales. ABD: Normal bowel sounds; non-distended; non-tender; no palpable organomegaly, no masses, no bruits. Back Exam: Normal inspection; no vertebral point tenderness, no CVA tenderness. Normal range of motion. EXT: Normal ROM in all four extremities; non-tender to palpation; no swelling or deformity; distal pulses are normal, no edema. SKIN: Warm; dry; no rash.   NEURO:Alert and oriented x 3, coherent, KENNY-XII grossly intact, sensory and motor are non-focal.        MDM  Number of Diagnoses or Management Options  Diagnosis management comments: Assessment: Diarrhea-suspect colitis possible infectious versus foodborne illness versus viral process-rule out electrolyte abnormality and dehydration    Plan: Lab/IV fluid antiemetic and analgesia abdominal x-ray serial exam/ Monitor and Reevaluate. Amount and/or Complexity of Data Reviewed  Clinical lab tests: ordered and reviewed  Tests in the radiology section of CPT®: ordered and reviewed  Tests in the medicine section of CPT®: reviewed and ordered  Discussion of test results with the performing providers: yes  Decide to obtain previous medical records or to obtain history from someone other than the patient: yes  Obtain history from someone other than the patient: yes  Review and summarize past medical records: yes  Discuss the patient with other providers: yes  Independent visualization of images, tracings, or specimens: yes    Risk of Complications, Morbidity, and/or Mortality  Presenting problems: moderate  Diagnostic procedures: moderate  Management options: moderate    Patient Progress  Patient progress: stable         Procedures     ABDOMINAL XRAY INTERPRETATION (ED MD)  No free air. No evidence of obstruction. No air-fluid levels. Jax Hernandez MD 7:01 AM    Progress Note:   Pt has been reexamined by Melyssa Gotti MD. Pt is feeling much better. Symptoms have improved. All available results have been reviewed with pt and any available family. Pt understands sx, dx, and tx in ED. Care plan has been outlined and questions have been answered. Pt is ready to go home. Will send home on diarrhea and dehydration instruction. Prescription of Bentyl and Flagyl and Lomotil. Oral rehydration education. . Outpatient referral with PCP as needed. Written by Melyssa Gotti MD,6:58 AM    .   .

## 2019-07-20 NOTE — ED TRIAGE NOTES
Triage:Patient arrives ambulatory from home with c/o diffuse abdominal pain since dinner at 6pm yesterday. Reports he has had diarrhea as well. No n/v. Reports he ate fish and chicken for dinner.

## 2019-07-22 ENCOUNTER — HOSPITAL ENCOUNTER (OUTPATIENT)
Dept: PHYSICAL THERAPY | Age: 49
Discharge: HOME | End: 2019-07-22
Payer: MEDICAID

## 2019-07-22 PROCEDURE — 97110 THERAPEUTIC EXERCISES: CPT

## 2019-07-22 NOTE — PROGRESS NOTES
PT DAILY TREATMENT NOTE - [facility] 2-15    Patient Name: Nguyen Marquez  Date:2019  : 1970  [x]  Patient  Verified  Payor: BLUE [employer]OSS MEDICAID / Plan: [employer] / Product Type: Managed Care Medicaid /    In time: 2:30P  Out time:3:35P  Total Treatment Time (min): 65  Total Timed Codes (min): 65  1:1 Treatment Time ( only): 35   Visit #:  2    Treatment Area: Low back pain [M54.5]    SUBJECTIVE  Pain Level (0-10 scale): -7/10  Any medication changes, allergies to medications, adverse drug reactions, diagnosis change, or new procedure performed?: [x] No    [] Yes (see summary sheet for update)  Subjective functional status/changes:   [] No changes reported  Pt reports feeling a little better    OBJECTIVE      35 min Therapeutic Exercise:  [] See flow sheet :   Rationale: increase ROM, increase strength and improve coordination to improve the patients ability to increase function and mobility            With   [] TE   [] TA   [] neuro   [] other: Patient Education: [x] Review HEP    [] Progressed/Changed HEP based on:   [] positioning   [] body mechanics   [] transfers   [] heat/ice application    [] other:      Other Objective/Functional Measures: --     Pain Level (0-10 scale) post treatment: -7/10    ASSESSMENT/Changes in Function:   Pt reported some increase in discomfort in knees with TG. Pt encouraged to reduce ROM which helped reduce stress on knees    Patient will continue to benefit from skilled PT services to modify and progress therapeutic interventions, address functional mobility deficits, address ROM deficits, address strength deficits, analyze and address soft tissue restrictions, analyze and cue movement patterns, analyze and modify body mechanics/ergonomics and assess and modify postural abnormalities to attain remaining goals.      [x]  See Plan of Care  []  See progress note/recertification  []  See Discharge Summary         Progress towards goals / Updated goals:  Short Term Goals: To be accomplished in 5 treatments:  -Independent in HEP as evidenced on ability to perform at least 5 exercises from HEP using proper form without verbal cuing.   -Pain less than or equal to 7/10 at worst to allow patient to perform ADL's with greater ease  -Demostrate proper posture in order to decrease lumbar and knee pain  -Pt will report compliance with icing 1-2x/day in order to decrease inflammation     Long Term Goals: To be accomplished in 10 treatments:  -AROM lumbar flexion WNL and pain-free to allow pt to bend over to do housework without pain  -Full bridge x 30 in order to allow pt to walk and climb stairs with greater ease.  -Pain 0-5/10 to allow patient to perform basic bed mobility and table transfers without pain     Frequency / Duration: Patient to be seen 1-2 times per week for 10-12 weeks.     PLAN  [x]  Upgrade activities as tolerated     [x]  Continue plan of care  []  Update interventions per flow sheet       []  Discharge due to:_  []  Other:_      Carmen Benítez PTA, OPTA 7/22/2019

## 2019-07-23 NOTE — PROGRESS NOTES
I called and spoke with patient concerning result. He is feeling much better, diarrhea and pain have resolved.   Will continue rehydration and symptomatic treatment

## 2019-07-24 LAB
BACTERIA SPEC CULT: ABNORMAL
C JEJUNI+C COLI AG STL QL: NEGATIVE
E COLI SXT1+2 STL IA: NEGATIVE
SERVICE CMNT-IMP: ABNORMAL

## 2019-07-29 ENCOUNTER — HOSPITAL ENCOUNTER (OUTPATIENT)
Dept: PHYSICAL THERAPY | Age: 49
Discharge: HOME | End: 2019-07-29
Payer: MEDICAID

## 2019-07-29 PROCEDURE — 97110 THERAPEUTIC EXERCISES: CPT

## 2019-07-29 NOTE — PROGRESS NOTES
PT DAILY TREATMENT NOTE - [facility] 2-15    Patient Name: Dinora Adams  Date:2019  : 1970  [x]  Patient  Verified  Payor: BLUE [employer]OSS MEDICAID / Plan: [address] [place] [address] / Product Type: Managed Care Medicaid /    In time: 2:35P  Out time: 3:20P  Total Treatment Time (min): 50  Total Timed Codes (min): 50  1:1 Treatment Time [facility] only):30  Visit #:  3    Treatment Area: Low back pain [M54.5]    SUBJECTIVE  Pain Level (0-10 scale): 6-7/10  Any medication changes, allergies to medications, adverse drug reactions, diagnosis change, or new procedure performed?: [x] No    [] Yes (see summary sheet for update)  Subjective functional status/changes:   [] No changes reported  Pt reports back is getting better but hand and knees are hurting more. OBJECTIVE      50 min Therapeutic Exercise:  [] See flow sheet :   Rationale: increase ROM, increase strength and improve coordination to improve the patients ability to increase function and mobility            With   [] TE   [] TA   [] neuro   [] other: Patient Education: [x] Review HEP    [] Progressed/Changed HEP based on:   [] positioning   [] body mechanics   [] transfers   [] heat/ice application    [] other:      Other Objective/Functional Measures: --     Pain Level (0-10 scale) post treatment: 6-7/10    ASSESSMENT/Changes in Function:   Pt tolerated session well reporting less pain with TG today. Challenged with form for PPT    Patient will continue to benefit from skilled PT services to modify and progress therapeutic interventions, address functional mobility deficits, address ROM deficits, address strength deficits, analyze and address soft tissue restrictions, analyze and cue movement patterns, analyze and modify body mechanics/ergonomics and assess and modify postural abnormalities to attain remaining goals.      [x]  See Plan of Care  []  See progress note/recertification  []  See Discharge Summary         Progress towards goals / Updated goals:  Short Term Goals: To be accomplished in 5 treatments:  -Independent in HEP as evidenced on ability to perform at least 5 exercises from HEP using proper form without verbal cuing.   -Pain less than or equal to 7/10 at worst to allow patient to perform ADL's with greater ease  -Demostrate proper posture in order to decrease lumbar and knee pain  -Pt will report compliance with icing 1-2x/day in order to decrease inflammation     Long Term Goals: To be accomplished in 10 treatments:  -AROM lumbar flexion WNL and pain-free to allow pt to bend over to do housework without pain  -Full bridge x 30 in order to allow pt to walk and climb stairs with greater ease.  -Pain 0-5/10 to allow patient to perform basic bed mobility and table transfers without pain     Frequency / Duration: Patient to be seen 1-2 times per week for 10-12 weeks.     PLAN  [x]  Upgrade activities as tolerated     [x]  Continue plan of care  []  Update interventions per flow sheet       []  Discharge due to:_  []  Other:_      Blossom Ureña PTA, OPTA 7/29/2019

## 2019-08-01 ENCOUNTER — HOSPITAL ENCOUNTER (OUTPATIENT)
Dept: PHYSICAL THERAPY | Age: 49
Discharge: HOME | End: 2019-08-01
Payer: MEDICAID

## 2019-08-01 PROCEDURE — 97110 THERAPEUTIC EXERCISES: CPT

## 2019-08-01 NOTE — PROGRESS NOTES
PT DAILY TREATMENT NOTE - [facility] 2-15    Patient Name: Paola Gotti  Date:2019  : 1970  [x]  Patient  Verified  Payor: Tay Chicas / Plan: [id number] / Product Type: Managed Care Medicaid /    In time: 4:05P  Out time: 4:55P  Total Treatment Time (min): 50  Total Timed Codes (min): 50  1:1 Treatment Time ( only): 25  Visit #:  4    Treatment Area: Low back pain [M54.5]    SUBJECTIVE  Pain Level (0-10 scale): 10  Any medication changes, allergies to medications, adverse drug reactions, diagnosis change, or new procedure performed?: [x] No    [] Yes (see summary sheet for update)  Subjective functional status/changes:   [] No changes reported  Back still doing better knee and arm are still painful. Wants to do PT for both of those body parts as well. OBJECTIVE      50 min Therapeutic Exercise:  [x] See flow sheet :   Rationale: increase ROM, increase strength and improve coordination to improve the patients ability to increase function and mobility            With   [] TE   [] TA   [] neuro   [] other: Patient Education: [x] Review HEP    [] Progressed/Changed HEP based on:   [] positioning   [] body mechanics   [] transfers   [] heat/ice application    [] other:      Other Objective/Functional Measures: --     Pain Level (0-10 scale) post treatment: 4/10    ASSESSMENT/Changes in Function:   Pt reported relief with exercises on back but knee and arm still hurts. Patient will continue to benefit from skilled PT services to modify and progress therapeutic interventions, address functional mobility deficits, address ROM deficits, address strength deficits, analyze and address soft tissue restrictions, analyze and cue movement patterns, analyze and modify body mechanics/ergonomics and assess and modify postural abnormalities to attain remaining goals.      [x]  See Plan of Care  []  See progress note/recertification  []  See Discharge Summary         Progress towards goals / Updated goals:  Short Term Goals: To be accomplished in 5 treatments:  -Independent in HEP as evidenced on ability to perform at least 5 exercises from HEP using proper form without verbal cuing.   -Pain less than or equal to 7/10 at worst to allow patient to perform ADL's with greater ease  -Demostrate proper posture in order to decrease lumbar and knee pain  -Pt will report compliance with icing 1-2x/day in order to decrease inflammation     Long Term Goals: To be accomplished in 10 treatments:  -AROM lumbar flexion WNL and pain-free to allow pt to bend over to do housework without pain  -Full bridge x 30 in order to allow pt to walk and climb stairs with greater ease.  -Pain 0-5/10 to allow patient to perform basic bed mobility and table transfers without pain     Frequency / Duration: Patient to be seen 1-2 times per week for 10-12 weeks.     PLAN  [x]  Upgrade activities as tolerated     [x]  Continue plan of care  []  Update interventions per flow sheet       []  Discharge due to:_  []  Other:_      Fariha Gottlieb PTA, OPTA 8/1/2019

## 2019-08-07 ENCOUNTER — TELEPHONE (OUTPATIENT)
Dept: RHEUMATOLOGY | Age: 49
End: 2019-08-07

## 2019-08-07 ENCOUNTER — HOSPITAL ENCOUNTER (OUTPATIENT)
Dept: PHYSICAL THERAPY | Age: 49
Discharge: HOME | End: 2019-08-07
Payer: MEDICAID

## 2019-08-07 PROCEDURE — 97110 THERAPEUTIC EXERCISES: CPT

## 2019-08-07 NOTE — PROGRESS NOTES
PT DAILY TREATMENT NOTE - [facility] 2-15    Patient Name: Zen Preez  Date:2019  : 1970  [x]  Patient  Verified  Payor: [facility] MEDICAID / Plan: Maria Eugenia 46 / Product Type: Managed Care Medicaid /    In time: 2:35P  Out time: 3:26P  Total Treatment Time (min): 51  Total Timed Codes (min): 51  1:1 Treatment Time ( only): 30  Visit #:  5    Treatment Area: Low back pain [M54.5]    SUBJECTIVE  Pain Level (0-10 scale): 2-3/10  Any medication changes, allergies to medications, adverse drug reactions, diagnosis change, or new procedure performed?: [x] No    [] Yes (see summary sheet for update)  Subjective functional status/changes:   [] No changes reported  Pt reported back continues to feel better. Knee and arm still huts a lot    OBJECTIVE      51 min Therapeutic Exercise:  [x] See flow sheet :   Rationale: increase ROM, increase strength and improve coordination to improve the patients ability to increase function and mobility            With   [] TE   [] TA   [] neuro   [] other: Patient Education: [x] Review HEP    [] Progressed/Changed HEP based on:   [] positioning   [] body mechanics   [] transfers   [] heat/ice application    [] other:      Other Objective/Functional Measures: --     Pain Level (0-10 scale) post treatment: 1/10    ASSESSMENT/Changes in Function:   Pt continues to report improvements in LBP. Will FU with PT next session for reassessment. Patient will continue to benefit from skilled PT services to modify and progress therapeutic interventions, address functional mobility deficits, address ROM deficits, address strength deficits, analyze and address soft tissue restrictions, analyze and cue movement patterns, analyze and modify body mechanics/ergonomics and assess and modify postural abnormalities to attain remaining goals.      [x]  See Plan of Care  []  See progress note/recertification  []  See Discharge Summary         Progress towards goals / Updated goals:  Short Term Goals: To be accomplished in 5 treatments:  -Independent in HEP as evidenced on ability to perform at least 5 exercises from HEP using proper form without verbal cuing.   -Pain less than or equal to 7/10 at worst to allow patient to perform ADL's with greater ease  -Demostrate proper posture in order to decrease lumbar and knee pain  -Pt will report compliance with icing 1-2x/day in order to decrease inflammation     Long Term Goals: To be accomplished in 10 treatments:  -AROM lumbar flexion WNL and pain-free to allow pt to bend over to do housework without pain  -Full bridge x 30 in order to allow pt to walk and climb stairs with greater ease.  -Pain 0-5/10 to allow patient to perform basic bed mobility and table transfers without pain     Frequency / Duration: Patient to be seen 1-2 times per week for 10-12 weeks.     PLAN  [x]  Upgrade activities as tolerated     [x]  Continue plan of care  []  Update interventions per flow sheet       []  Discharge due to:_  []  Other:_      Alek Venegas PTA, OPT[facility] 8/7/2019

## 2019-08-07 NOTE — TELEPHONE ENCOUNTER
Patient walked in today and would like nurse to call him patient is requesting a referral to physical therapy. Good call back number is [phone].  sdh

## 2019-08-08 ENCOUNTER — APPOINTMENT (OUTPATIENT)
Dept: PHYSICAL THERAPY | Age: 49
End: 2019-08-08
Payer: MEDICAID

## 2019-08-15 ENCOUNTER — HOSPITAL ENCOUNTER (OUTPATIENT)
Dept: PHYSICAL THERAPY | Age: 49
Discharge: HOME | End: 2019-08-15
Payer: MEDICAID

## 2019-08-15 DIAGNOSIS — M17.0 PRIMARY OSTEOARTHRITIS OF BOTH KNEES: Primary | ICD-10-CM

## 2019-08-15 PROCEDURE — 97110 THERAPEUTIC EXERCISES: CPT | Performed by: PHYSICAL THERAPIST

## 2019-08-15 NOTE — ANCILLARY DISCHARGE INSTRUCTIONS
[facility]   [address]  [place], [address] [facility]  Phone: [phone] Fax: [phone]      Discharge Summary 2-15      Patient name: Tarah Flynn  : 1970    Provider#: [id number]  Referral source: Johnson Hewitt MD      Medical/Treatment Diagnosis: Low back pain [M54.5]     Prior Hospitalization: see medical history     Comorbidities: see evaluation  Prior Level of Function:see evaluation  Medications: Verified on Patient Summary List    Start of Care: 19    Onset Date:see evaluation   Visits from Start of Care: 6   Missed Visits:see connect care  Reporting Period : 19  to 8/15/19    Goal Status  Progress towards goals / Updated goals:  Short Term Goals: To be accomplished in 5 treatments:  -Independent in HEP as evidenced on ability to perform at least 5 exercises from HEP using proper form without verbal cuing. -MET  -Pain less than or equal to 7/10 at worst to allow patient to perform ADL's with greater ease-MET  -Demostrate proper posture in order to decrease lumbar and knee pain-MET  -Pt will report compliance with icing 1-2x/day in order to decrease inflammation-NOT MET     Long Term Goals: To be accomplished in 10 treatments:  -AROM lumbar flexion WNL and pain-free to allow pt to bend over to do housework without pain-MET  -Full bridge x 30 in order to allow pt to walk and climb stairs with greater ease. -MET  -Pain 0-5/10 to allow patient to perform basic bed mobility and table transfers without pain-MET       Assessment/Summary of care:      Pt with lumbar pain greatly dec since starting PT. AROM lumbar spine and strength greatly improved. Further improvement plateaued secondary to juliet knee pain. Pt ready to manage lumbar issue at home with HEP provided. RECOMMENDATIONS:  [x]Discontinue therapy:[x]Patient has reached or is progressing toward set goals-plan to eval juliet knees next.       []Patient is non-compliant or has abdicated     []Due to lack of appreciable progress towards set goals    Lorene Paul.  Nora, PT, DPT, CMTPT  8/15/2019 3:42 PM

## 2019-08-15 NOTE — PROGRESS NOTES
PT DAILY TREATMENT NOTE/RE-ASSESSMENT - [facility] 2-15    Patient Name: Pradeep Siddiqi  Date:8/15/2019  : 1970  [x]  Patient  Verified  Payor: [facility] MEDICAID / Plan: Maria Eugenia Millan / Product Type: Managed Care Medicaid /    In time: 300 P   Out time: 340 P    Total Treatment Time (min): 40  Total Timed Codes (min):40  1:1 Treatment Time ( only): 40  Visit #:  6    Treatment Area: Low back pain [M54.5]    SUBJECTIVE  Pain Level (0-10 scale): 2-3/10  Any medication changes, allergies to medications, adverse drug reactions, diagnosis change, or new procedure performed?: [x] No    [] Yes (see summary sheet for update)  Subjective functional status/changes:   [] No changes reported  Pt reported back continues to feel better.  Knee and arm still huts a lot    OBJECTIVE      40 min Therapeutic Exercise:  [x] See flow sheet :re-assesment   Rationale: increase ROM, increase strength and improve coordination to improve the patients ability to increase function and mobility            With   [] TE   [] TA   [] neuro   [] other: Patient Education: [x] Review HEP    [] Progressed/Changed HEP based on:   [] positioning   [] body mechanics   [] transfers   [] heat/ice application    [] other:      Other Objective/Functional Measures:     Observation: increased lumbar ext in standing, forward trunk lean  Lumbar shift                [] (L)        [x] (R)  Lordosis                      [] Inc    [x] Dec  Leg length discrepancy secondary to knee flexion contracture on (R)      Gait: pt slightly sidebent to the right throughout secondary to (R) lumbar shift and (R) knee flexion contracture     AROM lumbar spine WNL all planes     AROM (R) knee: -23 deg to 90 deg  AROM (L) knee: -15 deg to 115 deg     Strength  *5/5 unless noted below     L(0-5) R (0-5)   Hip Flexion (L1,2)       Knee Extension (L3,4)       Knee Flexion (S1,2)       Ankle Dorsiflexion (L4)       Great Toe Extension (L5)       Ankle Plantarflexion (S1)       Ankle Eversion (S1)       Lower Abdominals 4 4   Paraspinals 4 4   Hip Extensors 4 4   Hip Abductors 4 4   Hip ER's       Hip IR's          Poor TrA activation  Poor PPT  Full bridge x 30     Tenderness to palpation: juliet lumbar paraspinals, glute max, glute med, juliet quadr     Bed mobility: WNL, pain free     Flexibility: sig dec juliet quads, HS, piriformis, gastroc (R>L)     Joint mobility:    Hypomobility noted throughout lumbar spine L1-L5, pain with testing at all segments.      Pain Level (0-10 scale) post treatment: 1/10    ASSESSMENT/Changes in Function:     Progress towards goals / Updated goals:  Short Term Goals: To be accomplished in 5 treatments:  -Independent in HEP as evidenced on ability to perform at least 5 exercises from HEP using proper form without verbal cuing. -MET  -Pain less than or equal to 7/10 at worst to allow patient to perform ADL's with greater ease-MET  -Demostrate proper posture in order to decrease lumbar and knee pain-MET  -Pt will report compliance with icing 1-2x/day in order to decrease inflammation-NOT MET     Long Term Goals: To be accomplished in 10 treatments:  -AROM lumbar flexion WNL and pain-free to allow pt to bend over to do housework without pain-MET  -Full bridge x 30 in order to allow pt to walk and climb stairs with greater ease. -MET  -Pain 0-5/10 to allow patient to perform basic bed mobility and table transfers without pain-MET    Pt with lumbar pain greatly dec since starting PT. AROM lumbar spine and strength greatly improved. Further improvement plateaued secondary to juliet knee pain. Pt ready to manage lumbar issue at home with HEP provided.       PLAN  [x]  Upgrade activities as tolerated     [x]  Continue plan of care  []  Update interventions per flow sheet       [x]  Discharge due to:_  [x]  Other:_  See DC note. Plan to eval juliet knees next session  Elis Peoples.  Nora, PT 8/15/2019

## 2019-08-28 ENCOUNTER — HOSPITAL ENCOUNTER (OUTPATIENT)
Dept: PHYSICAL THERAPY | Age: 49
Discharge: HOME | End: 2019-08-28
Payer: MEDICAID

## 2019-08-28 PROCEDURE — 97161 PT EVAL LOW COMPLEX 20 MIN: CPT | Performed by: PHYSICAL THERAPIST

## 2019-08-28 PROCEDURE — 97110 THERAPEUTIC EXERCISES: CPT | Performed by: PHYSICAL THERAPIST

## 2019-08-28 NOTE — PROGRESS NOTES
[facility]  [address] [facility], [address] [place] [address]  Phone: [phone]  Fax: [phone]    Plan of Care/Statement of Necessity for Physical Therapy Services  2-15    Patient name: Scott Koroma  : 1970  Provider#: [id number]  Referral source: Saúl Adams MD      Medical/Treatment Diagnosis: Bilateral knee pain [M25.561, M25.562]     Prior Hospitalization: see medical history     Comorbidities: see evaluation  Prior Level of Function:see evaluation  Medications: Verified on Patient Summary List  Start of Care: 2019     Onset Date:see evaluation   The Plan of Care and following information is based on the information from the initial evaluation. Assessment/ key information: Patient presents with signs and symptoms consistent with juliet knee OA  and will benefit from physical therapy to address deficits noted below in problem list.     Problem List: pain affecting function, decrease ROM, decrease strength, edema affecting function, decrease ADL/ functional abilitiies, decrease activity tolerance, decrease flexibility/ joint mobility and decrease transfer abilities   Treatment Plan may include any combination of the following: Therapeutic exercise, Therapeutic activities, Neuromuscular re-education, Physical agent/modality, Manual therapy, Patient education and Self Care training  Patient / Family readiness to learn indicated by: asking questions, trying to perform skills and interest  Persons(s) to be included in education: patient (P)  Barriers to Learning/Limitations: None  Patient Goal (s): please see evaluation in Connect Care  Patient Self Reported Health Status: please see paper chart  Rehabilitation Potential: good    Short Term Goals:  To be accomplished in 5 treatments:  -Independent in HEP as evidenced on ability to perform at least 5 exercises from HEP using proper form without verbal cuing.   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 Patient/ Caregiver education and instruction: self care, activity modification and exercises    [x]  Plan of care has been reviewed with PTA    Certification Period: 8/28/2019 -  11/28/19    Donya Chao. Nora PT, DPT, CMTPT      5/47/9991 2:45 PM  PT License Number: [id number]  _____________________________________________________________________    I certify that the above Therapy Services are being furnished while the patient is under my care. I agree with the treatment plan and certify that this therapy is necessary.     [de-identified] Signature:____________________  Date:____________Time:_________

## 2019-08-28 NOTE — PROGRESS NOTES
PT INITIAL EVALUATION NOTE - [facility] 2-15    Patient Name: Omayra Maher  Date:2019  : 1970  [x]  Patient  Verified  Payor: [facility] MEDICAID / Plan: [address] [place] / Product Type: Managed Care Medicaid /    In time:100 P   Out time:200 P   Total Treatment Time (min): 60 (45 eval, 15 timed see below)  Total Timed Codes (min) 15  Visit #:1    Treatment Area: Bilateral knee pain [M25.561, M25.562]    SUBJECTIVE  Any medication changes, allergies to medications, adverse drug reactions, diagnosis change, or new procedure performed?: [] No    [x] Yes (see summary sheet for update)  Date of onset/injury: 10 years ago  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  Pain: 10/10 max 4-5/10 min 5/10 now     Location of symptoms: juliet knees -medial aspect  Description of symptoms: sharp  Aggravated by: knees-difficulty sleeping, stairs, standing after prolonged sitting, prolonged sitting, prolonged standing/walking  Eased by: massage  Prior tests/injections:xray juliet knees years ago-unsure of results per pt  PMH:  Psoriatic arthritis  Any clicking/popping or giving way: not currently  Occupation: [employer]   Born in [facility] [address] to live in [place] getting degree in [address] [place] [address] in [address] [facility] since   Prior level of function/activity level: relatively sedentary  Patient goal:\"to dec pain and swelling, to straighten knee. \"  Social: lives with family-has wife and 3 kids      OBJECTIVE    Observation: WNL    Gait: dec hip flexion during swing phase juliet, dec push off noted juliet    ROM knee  *note all end range alfaro by pain  (R)                AROM            PROM                    Knee Flexion 96  98    Extension -20  -18        (L)       AROM            PROM                    Knee Flexion 110 112    Extension -5 -3       Strength   5/5 unless noted below   (R)  (L)    Hip Flexion 3+/5 p! 3+/5 p! Extension 3+/5 p! 3+/5 p! Abduction 3+/5 p! 3+/5 p! Adduction 3+/5 p! 3+/5 p! IR 3+/5 p! 3+/5 p! ER 3+/5 p! 3+/5 p! Knee Flexion 4+5 p! 4+5 p! Extension 4+5 p! 4+5 p! Tenderness to palpation: juliet quad musculature    Joint Mobility: hypomobility noted tibial post glide bilaterally     Girth Measurements  Midpatellar pole: 40 cm (R)  Midpatellar pole: 38.5 (L)      LE Flexibility[de-identified] sig dec juliet HS and gastroc (R>L)    Special Tests: neg juliet for ligamentous/meniscal pathology    Functional Biomechanical Screen  Bilat HR x10: 80 %  SLS on R:5 sec  SLS on L: 3  sec  Squat: quad dominant, dec post weight shift noted, pain juliet knees     OBJECTIVE  [x] Skin assessment post-treatment:  [x]intact []redness- no adverse reaction    []redness - adverse reaction:     15 min Therapeutic Exercise:  [x] See flow sheet :   Rationale: increase ROM and increase strength to improve the patients ability to sit and walk for prolonged periods . With   [x] TE   [] manual   [] self care    Patient Education: [x] Review HEP    [] Progressed/Changed HEP based on:   [] positioning   [] body mechanics   [] transfers   [x] Ice application- pt advised to ice 10-15 min 1-2 x/day to area in order to dec inflammation  [x] other:  re: mechanism of injury/condition, role of physical therapy, prognosis for recovery, heat vs ice, activity modifications-advised talking walking breaks x 5 min q 30 min . Pain Level (0-10 scale) post treatment: 5    ASSESSMENT/Changes in Function:     [x]  See Plan of Cristofer.  Nora PT, [place], [place]  PT License Number: [id number]   8/28/2019  1:02 PM

## 2019-08-29 ENCOUNTER — OFFICE VISIT (OUTPATIENT)
Dept: RHEUMATOLOGY | Age: 49
End: 2019-08-29

## 2019-08-29 VITALS
DIASTOLIC BLOOD PRESSURE: 78 MMHG | HEIGHT: 69 IN | RESPIRATION RATE: 18 BRPM | SYSTOLIC BLOOD PRESSURE: 117 MMHG | HEART RATE: 114 BPM | WEIGHT: 210 LBS | BODY MASS INDEX: 31.1 KG/M2 | TEMPERATURE: 98.3 F

## 2019-08-29 DIAGNOSIS — M25.421 ELBOW JOINT EFFUSION, RIGHT: ICD-10-CM

## 2019-08-29 DIAGNOSIS — Z72.0 TOBACCO USE: ICD-10-CM

## 2019-08-29 DIAGNOSIS — L40.50 PSORIATIC ARTHRITIS (HCC): Primary | ICD-10-CM

## 2019-08-29 DIAGNOSIS — Z79.60 LONG-TERM USE OF IMMUNOSUPPRESSANT MEDICATION: ICD-10-CM

## 2019-08-29 DIAGNOSIS — L40.9 PSORIASIS: ICD-10-CM

## 2019-08-29 RX ORDER — METHOTREXATE 2.5 MG/1
15 TABLET ORAL
Qty: 72 TAB | Refills: 0 | Status: SHIPPED | OUTPATIENT
Start: 2019-08-31 | End: 2020-01-27

## 2019-08-29 RX ORDER — FOLIC ACID 1 MG/1
1 TABLET ORAL DAILY
Qty: 90 TAB | Refills: 0 | Status: SHIPPED | OUTPATIENT
Start: 2019-08-29 | End: 2020-04-07 | Stop reason: SDUPTHER

## 2019-08-29 NOTE — LETTER
8/29/19 Patient: Sharath Crane YOB: 1970 Date of Visit: 8/29/2019 Janes Bess MD 
[address] [facility] [address] [place] [id number] VIA In Basket Dear Janes Bess MD, Thank you for referring Mr. Cassandra Masters to [address] [place] [address] for evaluation. My notes for this consultation are attached. If you have questions, please do not hesitate to call me. I look forward to following your patient along with you.  
 
 
Sincerely, 
 
Irish Onofre MD

## 2019-08-29 NOTE — PATIENT INSTRUCTIONS
Take methotrexate for 3 months    Follow up in 3 months    Call your pharmacy to ask them to change your Cosentyx back to the PENs

## 2019-08-29 NOTE — PROGRESS NOTES
REASON FOR VISIT    This is a follow-up visit for Mr. Berenice So for     ICD-10-CM   1. Psoriatic arthritis ([facility].) L40.50   2. Psoriasis L40.9     Spondyloarthritis phenotype includes:  Anti-CCP positive: no  Rheumatoid factor positive: no  HLA-B27 positive: no  Inflammatory Back Pain: no  Erosive disease: no  Sacroiliitis: no  Ankylosis: no  Psoriasis: yes  Enthesitis: no  Dactylitis: no  Nail Pitting: no  Onycholysis: no  Splinter Hemorrhage: no  Extra-articular manifestations include: none  SAPHO: no    Immunosuppression Screening (5/29/2019): Quantiferon TB: negative  PPD:  Not performed  Hepatitis B: negative  Hepatitis C: negative    Therapy History includes:  Current NSAIDs include: Celebrex 100 mg twice daily  Current DMARD include: Cosentyx 300 mg every 30 days (6/2018 to present)  Prior DMARD therapy includes: methotrexate 15 mg weekly (8/13/2014 to 5/27/2015; insurance coverage, 6/28/2019 to 7/28/2019; did not refill), sulfasalazine 1000 mg twice daily (6/27/2012 to 8/13/2014; 12/02/2014 to 500 mg twice daily;10/02/2014 to 12/02/2014), Enbrel, Humira, Remicade, Otezla 30 mg twice daily (5/2015 to 6/2018), Stelara, Cosentyx  The following DMARDs have been ineffective: sulfasalazine, methotrexate, Otezla, Enbrel, Humira, Remicade   The following DMARDs were stopped because of side effects: none    Active problems include:    Patient Active Problem List   Diagnosis Code    Diabetes ([facility] E11.9    Psoriasis L40.9    Psoriatic arthritis ([facility] L40.50    Hyperlipidemia E78.5    Long-term use of immunosuppressant medication Z79.899    PPD positive R76.11    Tobacco use Z72.0    Long term current use of non-steroidal anti-inflammatories (NSAID) Z79.1    Primary osteoarthritis of both knees M17.0     HISTORY OF PRESENT ILLNESS    Mr. Berenice So returns for a follow-up visit. On his last visit, he reported breakthrough from Cosentyx 5 to 10 before his next dose, in his DIPs.  I prescribed methotrexate 15 mg every Saturday with daily folic acid 1 mg but only took it for 4 weeks with good tolerance. He also had fallen and injured his back so I had referred him to physical therapy and is better. Today, he continues to have pain in his hands, right elbow pain. He feels at night a blowing sensation in his right arm between his shoulder and elbow. He has swelling in his fingers. He has pain in his right knee that is worse at night and that feels stabbing. These issues are causing him sleeping problems. He smokes 1 PPD to more than than a pack. He endorses intermittent dysphagia. He denies fever, weight loss, blurred vision, vision loss, oral ulcers, ankle swelling, dry cough, dyspnea, nausea, vomiting, abdominal pain, black or bloody stool, fall since last visit, rash, easy bruising and increased thirst.    Mr. Dileep Bergeron has continued his medications for arthritis and reports good tolerance without significant side effects. Most recent toxicity labs on 7/20/2019 revealed no abnormalities, except      The patient has not had any interval hospital admissions, infections, or surgeries. REVIEW OF SYSTEMS    A comprehensive review of systems was performed and pertinent results are documented in the HPI, review of systems is otherwise non-contributory. PAST MEDICAL HISTORY    He has a past medical history of Arthritis, Diabetes ([facility] (5/27/2009), Elevated liver enzymes, Hypercholesterolemia, Psoriasis (5/27/2009), and Psoriatic arthritis ([facility] (5/27/2009). FAMILY HISTORY    His family history includes Asthma in his sister; Diabetes in his father and paternal grandmother; No Known Problems in his brother and mother. SOCIAL HISTORY    He reports that he has been smoking cigarettes. He has a 10.00 pack-year smoking history. He has never used smokeless tobacco. He reports that he does not drink alcohol or use drugs.     IMMUNIZATIONS  Immunization History   Administered Date(s) Administered    H1N1 Influenza Virus Vaccine 10/27/2009    Influenza Vaccine Split 10/10/2012, 10/19/2012    Pneumococcal Polysaccharide (PPSV-23) 11/11/2015       MEDICATIONS    Current Outpatient Medications   Medication Sig Dispense Refill    [START ON 8/31/2019] methotrexate (RHEUMATREX) 2.5 mg tablet Take 6 Tabs by mouth Every Saturday. 72 Tab 0    folic acid (FOLVITE) 1 mg tablet Take 1 Tab by mouth daily. 90 Tab 0    secukinumab (COSENTYX PEN, 2 PENS,) 150 mg/mL pnij 300 mg by SubCUTAneous route every four (4) weeks. Indications: psoriasis associated with arthritis 2 Syringe 11    diphenoxylate-atropine (LOMOTIL) 2.5-0.025 mg per tablet Take 1 Tab by mouth four (4) times daily as needed for Diarrhea (1 tab after each stool for max 8 per day). Max Daily Amount: 4 Tabs. Take after each stool for a maximum of 8 tablets daily 20 Tab 0    glimepiride (AMARYL) 2 mg tablet Take  by mouth every morning.  SITagliptin (JANUVIA) 100 mg tablet Take 100 mg by mouth daily.  FARXIGA 5 mg tab tablet TAKE 1 TABLET BY MOUTH EVERY DAY 30 Tab 5    clobetasol (TEMOVATE) 0.05 % ointment Apply  to affected area two (2) times a day.  glucose blood VI test strips (ASCENSIA AUTODISC VI, ONE TOUCH ULTRA TEST VI) strip Twice a day 1 Package 12    Lancets Misc Bid as directed. 2 Package 12        ALLERGIES    Allergies   Allergen Reactions    Iodinated Contrast Media Shortness of Breath       PHYSICAL EXAMINATION    Visit Vitals  /78   Pulse (!) 114   Temp 98.3 °F (36.8 °C)   Resp 18   Ht 5' 9\" (1.753 m)   Wt 210 lb (95.3 kg)   BMI 31.01 kg/m²     Body mass index is 31.01 kg/m². General: Patient is alert, oriented x 3, not in acute distress    HEENT:   Sclerae are not injected and appear moist.  There is no alopecia. Cardiovascular:  Heart is regular rate and rhythm, no murmurs. Chest:  Lungs are clear to auscultation bilaterally.     Extremities:  Free of clubbing, cyanosis, edema    Neurological exam:  Muscle strength is full in upper and lower extremities     Skin:    Psoriasis:     Yes (small patch posterior Achilles)  Nail Pitting:     no  Onycholysis:     no  Splinter Hemorrhage:   no  Palmoplantar pustulosis:   no  Acne fulminans:    no  Acne conglobata:    no  Hidradenitis Suppurativa:   no  Dissecting cellulitis of the scalp:  no  Pilonidal sinus:    no  Erythema nodosum:    no    Musculoskeletal:  A comprehensive musculoskeletal exam was NOT performed for all joints of each upper and lower extremity and assessed for swelling, tenderness and range of motion. Previous Exam    Focused hand exam revealed tenderness of the right 3rd DIP, LEFT IP, 2nd and 3rd IP  Lumbar spine exam noted left paraspinal muscular tenderness. No vertebral process tenderness    Right elbow flexion deformity  Bilateral knee crepitus with some decrease ROM of the right knee     Costochondritis:  no   Synovitis:   yes (see table. Bilateral MTPs. DIP LEFT: 2nd; RIGHT: 3rd)  Dactylitis:   no  Enthesitis:   no    Joint Count 5/29/2019   Left thumb IP - Tender 1   Left thumb IP - Swollen 0   Left 2nd PIP - Tender 1   Left 2nd PIP - Swollen 0   Left 4th PIP - Tender 1   Left 4th PIP - Swollen 0   Left 5th PIP - Tender 1   Left 5th PIP - Swollen 0   Right elbow - Tender 1   Right elbow - Swollen 1   Tender Joint Count (Total) 5   Swollen Joint Count (Total) 1     DATA REVIEW    Laboratory     Recent laboratory results were reviewed, summarized, and discussed with the patient. Imaging    Musculoskeletal Ultrasound    None    Radiographs    Bilateral Hand 5/29/2019: LEFT: No fracture or dislocation on plain film. No joint space narrowing. No joint space erosion or periosteal reaction. Alignment is within normal limits. Bone mineralization is decreased. No soft tissue calcification. RIGHT: No fracture or dislocation on plain film. No joint space narrowing. There is no joint space erosion.  There is subtle periostitis distal phalanx of the middle finger. Alignment is within normal limits. Bone mineralization is decreased. No soft tissue calcification. Chest 3/30/2019: Cardiac monitoring wires overlie the thorax. The cardiomediastinal and hilar contours are within normal limits. The pulmonary vasculature is within normal limits. The lungs and pleural spaces are clear. The visualized bones and upper abdomen are age-appropriate. Right Elbow 12/15/2014: a moderate right elbow effusion.  A nondisplaced fracture of the radial head is suspected. No dislocation is shown. CT Imaging    CT Head without contrast 9/10/2010: normal ventricles and basal cisterns. No intracranial masses or hemorrhages are seen. No focal intraparenchymal low density abnormalities are seen. MR Imaging    MRI Right Elbow without contrast 12/22/2014: there is a large complex appearing elbow joint effusion. There is  diffuse chondral thinning. Mild diffuse osseous edema is shown. The elbow collateral ligaments are intact. No tendon derangement is demonstrated. No soft tissue mass is shown. DXA     None    ASSESSMENT AND PLAN    This is a follow-up visit for Mr. Crystal Brower. 1) Psoriatic Arthritis. (psoriasis, arthritis) He is maintained on Cosentyx 300 mg monthly with great improvement of his psoriasis but he continues to have joint pain in his right elbow, fingers, and knees. He had a complex right elbow effusion noted on MRI in 12/22/2014 therefore, he may need surgical intervention to debride it, since it continues to be symptomatic. I had referred him to orthopedics, Dr. Marion Espinal for evaluation and treatment, which he has not done, so I asked him to do so. Due to breakthrough from Cosentyx 5 to 10 before his next dose, in his DIPs, I prescribed methotrexate 15 mg every Saturday with daily folic acid 1 mg which he took for 1 month due to not getting more refills from the pharmacy.  I explained that he needs to take it regularly and likely his pharmacy per his insurance only allows 30 day supplies at time. He understood. 2) Long Term Use of Immunosuppressants. The patient remains on immunomodulatory medications (Cosentyx) and requires frequent toxicity monitoring by blood work. 3) Bilateral Knee Osteoarthritis. I referred him to physical therapy. He declined injections. 4) Long Term Use of NSAIDs. The patient remains on Celebrex. His most recent renal function was normal.     5) Tobacco Use. He smokes 1 PPD. I counseled smoking cessation due to worsening prognosis of his PsA. 6) Acute Low Back Pain s/p Fall. This improved with physical therapy. The patient voiced understanding of the aforementioned assessment and plan. Summary of plan was provided in the After Visit Summary patient instructions.      TODAY'S ORDERS    Orders Placed This Encounter    methotrexate (RHEUMATREX) 2.5 mg tablet    folic acid (FOLVITE) 1 mg tablet    secukinumab (COSENTYX PEN, 2 PENS,) 150 mg/mL pnij     Future Appointments   Date Time Provider Gale Brewer   9/5/2019  1:00 PM Stardonya Adame., PT [facility] [place] [facility]   9/12/2019  1:30 PM Starlet Hawks., PT [facility] [place]   9/19/2019  1:00 PM Starlet Hawks., PT [facility] [place] [facility]   9/26/2019  1:00 PM Starlet Hawks., PT [facility] [place] [facility]   12/2/2019  2:20 PM MD Carine Saleh MD, [address] [place] [address]    Adult Rheumatology   Rheumatology Ultrasound Certified  [address] [facility] [address] [facility]   Phone [phone]  Fax [phone]

## 2019-08-29 NOTE — PROGRESS NOTES
Chief Complaint   Patient presents with    Arthritis     1. Have you been to the ER, urgent care clinic since your last visit? Hospitalized since your last visit? Yes [facility] for stomach pains    2. Have you seen or consulted any other health care providers outside of the [address] [place] [address] since your last visit? Include any pap smears or colon screening.  No

## 2019-09-03 ENCOUNTER — TELEPHONE (OUTPATIENT)
Dept: RHEUMATOLOGY | Age: 49
End: 2019-09-03

## 2019-09-03 NOTE — TELEPHONE ENCOUNTER
Pt's referral to Dr. Jamie Caldera was faxed to Dr. Charmayne Petty office along with last office notes.

## 2019-09-03 NOTE — TELEPHONE ENCOUNTER
Waldemar Siemens, Dr Ariel Troy's office needs clarification on this patient's referral due to the patient does not speak fluent English per the office. Her phone is [phone]. She will need referral faxed to them at [phone].

## 2019-09-05 ENCOUNTER — HOSPITAL ENCOUNTER (OUTPATIENT)
Dept: PHYSICAL THERAPY | Age: 49
Discharge: HOME | End: 2019-09-05
Payer: MEDICAID

## 2019-09-05 PROCEDURE — 97110 THERAPEUTIC EXERCISES: CPT | Performed by: PHYSICAL THERAPIST

## 2019-09-05 NOTE — PROGRESS NOTES
PT DAILY TREATMENT NOTE - [facility] 2-15    Patient Name: Kasie Fierro  Date:2019  : 1970  [x]  Patient  Verified  Payor: [facility] MEDICAID / Plan: [place] 46 / Product Type: Managed Care Medicaid /    In time:110 P   Out time:200 P   Total Treatment Time (min): 50  Total Timed Codes (min): 50  1:1 Treatment Time (MC/[place]): 50   Visit #: 2     Treatment Area: Bilateral knee pain [M25.561, M25.562]    SUBJECTIVE  Pain Level (0-10 scale): 6  Any medication changes, allergies to medications, adverse drug reactions, diagnosis change, or new procedure performed?: [x] No    [] Yes (see summary sheet for update)  Subjective functional status/changes: \"The exercises are helping. I hope my leg is straighter. \"    OBJECTIVE  [x] Skin assessment post-treatment:  [x]intact []redness- no adverse reaction    []redness - adverse reaction:      50 min Therapeutic Exercise:  [x] See flow sheet :   Rationale: increase ROM, increase strength and improve functional mobility to improve the patients ability to walk and climb stairs      With   [x] TE   [] manual   [] self care Patient Education: [x] Review HEP    [] Progressed/Changed HEP based on:   [] positioning   [] body mechanics   [] transfers   [] heat/ice application    [] other:     Other Objective/Functional Measures:     Pain Level (0-10 scale) post treatment:4    ASSESSMENT    Patient will continue to benefit from skilled PT services to modify and progress therapeutic interventions, address functional mobility deficits, address ROM deficits, address strength deficits and analyze and address soft tissue restrictions to attain remaining goals. Progress towards goals / Updated goals:      PLAN  []  Upgrade activities as tolerated     [x]  Continue plan of care  []  Update interventions per flow sheet       []  Discharge due to:_  []  Other:_      Joe Guzman.  Nora PT, DPT, CMTPT    0/3/8270    PT License Number: [id number]

## 2019-09-06 ENCOUNTER — TELEPHONE (OUTPATIENT)
Dept: RHEUMATOLOGY | Age: 49
End: 2019-09-06

## 2019-09-06 NOTE — TELEPHONE ENCOUNTER
----- Message from Su Hobson sent at 9/6/2019  2:18 PM EDT -----  Regarding: Dr. Germán Maxwell Message/Vendor Calls    Caller's first and last name:Pt      Reason for call: Requesting a call back in regards to a referral his right elbow Dr. Moncho Pizano does not take insurance.        Callback required yes/no and why:Yes      Best contact number(s):[phone]      Details to clarify the request:      Su Hobson

## 2019-09-06 NOTE — TELEPHONE ENCOUNTER
Spoke with pt who stated that his insurance is not accepted by Dr. Jayson Zhang. I told him that he needs to call his insurance company and find out what orthopedic doc his accepts and that we will send a referral to one of them. He stated an understanding.

## 2019-09-12 ENCOUNTER — HOSPITAL ENCOUNTER (OUTPATIENT)
Dept: PHYSICAL THERAPY | Age: 49
Discharge: HOME | End: 2019-09-12
Payer: MEDICAID

## 2019-09-12 PROCEDURE — 97110 THERAPEUTIC EXERCISES: CPT | Performed by: PHYSICAL THERAPIST

## 2019-09-12 NOTE — PROGRESS NOTES
PT DAILY TREATMENT NOTE - [facility] 2-15    Patient Name: Adriana Matos  Date:2019  : 1970  [x]  Patient  Verified  Payor: [facility] MEDICAID / Plan: [place] 46 / Product Type: Managed Care Medicaid /    In time:130 P   Out time: 225 P  Total Treatment Time (min): 55  Total Timed Codes (min): 55  1:1 Treatment Time (MC/[place]):  55  Visit #:3    Treatment Area: Bilateral knee pain [M25.561, M25.562]    SUBJECTIVE  Pain Level (0-10 scale): 5  Any medication changes, allergies to medications, adverse drug reactions, diagnosis change, or new procedure performed?: [x] No    [] Yes (see summary sheet for update)  Subjective functional status/changes:     \"Pain comes and goes. .\"    OBJECTIVE  [x] Skin assessment post-treatment:  [x]intact []redness- no adverse reaction    []redness - adverse reaction:      55 min Therapeutic Exercise:  [x] See flow sheet :   Rationale: increase ROM, increase strength and improve functional mobility to improve the patients ability to walk and climb stairs      With   [x] TE   [] manual   [] self care Patient Education: [x] Review HEP    [] Progressed/Changed HEP based on:   [] positioning   [] body mechanics   [] transfers   [] heat/ice application    [] other:     Other Objective/Functional Measures:     Pain Level (0-10 scale) post treatment:4    ASSESSMENT    Patient will continue to benefit from skilled PT services to modify and progress therapeutic interventions, address functional mobility deficits, address ROM deficits, address strength deficits and analyze and address soft tissue restrictions to attain remaining goals. Progress towards goals / Updated goals:      PLAN  []  Upgrade activities as tolerated     [x]  Continue plan of care  []  Update interventions per flow sheet       []  Discharge due to:_  []  Other:_      Jt Jones.  Nora, PT, DPT, CMTPT        PT License Number: [id number]

## 2019-09-19 ENCOUNTER — HOSPITAL ENCOUNTER (OUTPATIENT)
Dept: PHYSICAL THERAPY | Age: 49
Discharge: HOME | End: 2019-09-19
Payer: MEDICAID

## 2019-09-19 PROCEDURE — 97110 THERAPEUTIC EXERCISES: CPT | Performed by: PHYSICAL THERAPIST

## 2019-09-19 NOTE — PROGRESS NOTES
PT DAILY TREATMENT NOTE - [facility] 2-15    Patient Name: Nguyen Marquez  Date:2019  : 1970  [x]  Patient  Verified  Payor: [facility] MEDICAID / Plan: [place] 46 / Product Type: Managed Care Medicaid /    In time:105 P   Out time: 150  Total Treatment Time (min): 45  Total Timed Codes (min):45  1:1 Treatment Time (MC/[place]):  45  Visit #:3    Treatment Area: Bilateral knee pain [M25.561, M25.562]    SUBJECTIVE  Pain Level (0-10 scale): 6  Any medication changes, allergies to medications, adverse drug reactions, diagnosis change, or new procedure performed?: [x] No    [] Yes (see summary sheet for update)  Subjective functional status/changes: \"The exercises are helping a lot  OBJECTIVE  [x] Skin assessment post-treatment:  [x]intact []redness- no adverse reaction    []redness - adverse reaction:      45 min Therapeutic Exercise:  [x] See flow sheet :   Rationale: increase ROM, increase strength and improve functional mobility to improve the patients ability to walk and climb stairs      With   [x] TE   [] manual   [] self care Patient Education: [x] Review HEP    [] Progressed/Changed HEP based on:   [] positioning   [] body mechanics   [] transfers   [] heat/ice application    [] other:     Other Objective/Functional Measures:     Pain Level (0-10 scale) post treatment:4    ASSESSMENT    Patient will continue to benefit from skilled PT services to modify and progress therapeutic interventions, address functional mobility deficits, address ROM deficits, address strength deficits and analyze and address soft tissue restrictions to attain remaining goals. Progress towards goals / Updated goals:      PLAN  []  Upgrade activities as tolerated     [x]  Continue plan of care  []  Update interventions per flow sheet       []  Discharge due to:_  []  Other:_      Elis Peoples.  Nora, PT, DPT, CMTPT    [id number]    PT License Number: [id number]

## 2019-09-26 ENCOUNTER — HOSPITAL ENCOUNTER (OUTPATIENT)
Dept: PHYSICAL THERAPY | Age: 49
Discharge: HOME | End: 2019-09-26
Payer: MEDICAID

## 2019-09-26 PROCEDURE — 97110 THERAPEUTIC EXERCISES: CPT | Performed by: PHYSICAL THERAPIST

## 2019-09-26 NOTE — PROGRESS NOTES
PT DAILY TREATMENT NOTE - [facility] 2-15    Patient Name: Adriana Matos  Date:2019  : 1970  [x]  Patient  Verified  Payor: [facility] MEDICAID / Plan: [place] 46 / Product Type: Managed Care Medicaid /    In time:100 P   Out time: 155 P  Total Treatment Time (min): 55  Total Timed Codes (min)55  Visit #:5    Treatment Area: Bilateral knee pain [M25.561, M25.562]    SUBJECTIVE  Pain Level (0-10 scale): 6  Any medication changes, allergies to medications, adverse drug reactions, diagnosis change, or new procedure performed?: [x] No    [] Yes (see summary sheet for update)  Subjective functional status/changes:       \"I had trouble sleeping last night so my knees hurt a bit more today. It does that when I don't sleep well. \"  OBJECTIVE  [x] Skin assessment post-treatment:  [x]intact []redness- no adverse reaction    []redness - adverse reaction:      55 min Therapeutic Exercise:  [x] See flow sheet :   Rationale: increase ROM, increase strength and improve functional mobility to improve the patients ability to walk and climb stairs      With   [x] TE   [] manual   [] self care Patient Education: [x] Review HEP    [] Progressed/Changed HEP based on:   [] positioning   [] body mechanics   [] transfers   [] heat/ice application    [] other:     Other Objective/Functional Measures:     Pain Level (0-10 scale) post treatment:4    ASSESSMENT    Patient will continue to benefit from skilled PT services to modify and progress therapeutic interventions, address functional mobility deficits, address ROM deficits, address strength deficits and analyze and address soft tissue restrictions to attain remaining goals. Progress towards goals / Updated goals:      PLAN  []  Upgrade activities as tolerated     [x]  Continue plan of care  []  Update interventions per flow sheet       []  Discharge due to:_  []  Other:_      Jt Jones.  Nora PT, DPT, CMTPT        PT License Number: [id number]

## 2019-10-04 DIAGNOSIS — L40.50 PSORIATIC ARTHRITIS (HCC): ICD-10-CM

## 2019-10-10 ENCOUNTER — APPOINTMENT (OUTPATIENT)
Dept: PHYSICAL THERAPY | Age: 49
End: 2019-10-10
Payer: MEDICAID

## 2019-10-24 ENCOUNTER — HOSPITAL ENCOUNTER (OUTPATIENT)
Dept: PHYSICAL THERAPY | Age: 49
Discharge: HOME | End: 2019-10-24
Payer: MEDICAID

## 2019-10-24 PROCEDURE — 97110 THERAPEUTIC EXERCISES: CPT | Performed by: PHYSICAL THERAPIST

## 2019-10-24 NOTE — PROGRESS NOTES
[facility]    Patient Name: Hema Grover  Date:10/24/2019  : 1970  [x]  Patient  Verified  Payor: [facility] MEDICAID / Plan: [place] 46 / Product Type: Managed Care Medicaid /    In time:300 P  Out time: 400 P   Total Treatment Time (min):60  Total Timed Codes (min)50  Visit #:6    Treatment Area: Bilateral knee pain [M25.561, M25.562]    SUBJECTIVE  Pain Level (0-10 scale):5  Any medication changes, allergies to medications, adverse drug reactions, diagnosis change, or new procedure performed?: [x] No    [] Yes (see summary sheet for update)  Subjective functional status/changes:       My knee is feeling much better. OBJECTIVE  [x] Skin assessment post-treatment:  [x]intact []redness- no adverse reaction    []redness - adverse reaction:      50 min Therapeutic Exercise:  [x] See flow sheet :   Rationale: increase ROM, increase strength and improve functional mobility to improve the patients ability to walk and climb stairs      With   [x] TE   [] manual   [] self care Patient Education: [x] Review HEP    [] Progressed/Changed HEP based on:   [] positioning   [] body mechanics   [] transfers   [] heat/ice application    [x] other: re-assessment: PROM juliet knee flexion and ext performed afterwards. Other Objective/Functional Measures:        Gait: WNL     ROM knee  *note all end range alfaro by pain  (R)                                       AROM            PROM                    Knee Flexion 96 98     Extension -10 -5          (L)                                         AROM            PROM                    Knee Flexion 120 125     Extension -5 0         Strength   5/5 unless noted below   (R)               (L)                      Hip Flexion 4+/5 p! 4+/5 p!     Extension 4+/5 p! 4+/5 p!     Abduction 4+/5 p! 4+/5 p!     Adduction 4+/5 p! 4+/5 p!     IR 4+/5 p!! 4+/5 p!     ER 4+/5 p! 4+/5 p!    Knee Flexion 4+5 p! 4+5 p!     Extension 4+5 p! 4+5 p!      Tenderness to palpation: juliet quad musculature     Joint Mobility: hypomobility noted tibial post glide bilaterally        LE Flexibility[de-identified] sig dec juliet HS and gastroc (R>L)     Special Tests: neg juliet for ligamentous/meniscal pathology     Functional Biomechanical Screen  Bilat HR x10: 80 %  SLS on R:10 sec  SLS on L:10  sec  Squat: quad dominant, dec post weight shift noted    AROM/PROM left knee improved since initial assessment. Right knee ROM unchanged. Strength juliet improving of hip and knee musculature  Pain Level (0-10 scale) post treatment:4    ASSESSMENT      Progress towards goals / Updated goals:  Short Term Goals: To be accomplished in 5 treatments:  -Independent in HEP as evidenced on ability to perform at least 5 exercises from HEP using proper form without verbal cuing. 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  Nora, PT, DPT, CMTPT    29/65/9989    PT License Number: [id number]

## 2019-10-28 ENCOUNTER — HOSPITAL ENCOUNTER (OUTPATIENT)
Dept: PHYSICAL THERAPY | Age: 49
Discharge: HOME | End: 2019-10-28
Payer: MEDICAID

## 2019-10-28 PROCEDURE — 97110 THERAPEUTIC EXERCISES: CPT | Performed by: PHYSICAL THERAPIST

## 2019-10-28 PROCEDURE — 97140 MANUAL THERAPY 1/> REGIONS: CPT | Performed by: PHYSICAL THERAPIST

## 2019-10-28 NOTE — PROGRESS NOTES
PT DAILY TREATMENT NOTE - [facility] 2-15    Patient Name: Rosalinda Persaud  Date:10/28/2019  : 1970  [x]  Patient  Verified  Payor: [facility] MEDICAID / Plan: Maria Eugenia 46 / Product Type: Managed Care Medicaid /    In time:210 P  Out time:315 P   Total Treatment Time (min): 65  Total Timed Codes (min)55  Visit #:7    Treatment Area: Bilateral knee pain [M25.561, M25.562]    SUBJECTIVE  Pain Level (0-10 scale):2-3  Any medication changes, allergies to medications, adverse drug reactions, diagnosis change, or new procedure performed?: [x] No    [] Yes (see summary sheet for update)  Subjective functional status/changes:       Pt reports his shoulder is bothering him more than his knees at this point. Feeling like his knees are much better since starting. OBJECTIVE  [x] Skin assessment post-treatment:  [x]intact []redness- no adverse reaction    []redness - adverse reaction:      40 min Therapeutic Exercise:  [x] See flow sheet :   Rationale: increase ROM, increase strength and improve functional mobility to improve the patients ability to walk and climb stairs    15 min Manual Therapy: ROBERTO   Femoral posterior glide grade III-IV. PROM knee ext. PROM flexion. Patellar mobs sup/inf and med/lat   Rationale: decrease pain, increase ROM, increase tissue extensibility, decrease trigger points and improve joint mobility to improve the patients ability to climb stairs        With   [x] TE   [] manual   [] self care Patient Education: [x] Review HEP    [] Progressed/Changed HEP based on:   [] positioning   [] body mechanics   [] transfers   [] heat/ice application    [x] other: advised pt to look into gym membership in order to start transition to HEP.       Other Objective/Functional Measures:   Dec swelling noted roberto    Pain Level (0-10 scale) post treatment1    ASSESSMENT             Progress towards goals / Updated goals:      PLAN  []  Upgrade activities as tolerated     [x]  Continue plan of care  [] Update interventions per flow sheet       []  Discharge due to:_  [x]  Other:_  D/c next session to HEP if still doing well    Travis Wade.  Nora PT, DPT, CMTPT    85/73/5371    PT License Number: [id number]

## 2019-10-30 ENCOUNTER — TELEPHONE (OUTPATIENT)
Dept: RHEUMATOLOGY | Age: 49
End: 2019-10-30

## 2019-10-30 NOTE — TELEPHONE ENCOUNTER
----- Message from Farzad Harkins RN sent at 10/30/2019  2:26 PM EDT -----  Regarding: FW: /Refill      ----- Message -----  From: Donnell Curling, LPN  Sent: 43/24/8165   2:14 PM EDT  To: Farzad Harkins RN  Subject: FW: /Nguyễnill                                 ----- Message -----  From: Jayy Soria  Sent: 10/30/2019   2:09 PM EDT  To: [facility] Nurse Pool  Subject: /Refill                                 Medication Refill    Caller (if not patient):      Relationship of caller (if not patient):       Best contact number(s): [phone]      Name of medication and dosage if known:      Is patient out of this medication (yes/no):      Pharmacy name:mail order [facility]    Pharmacy listed in chart? (yes/no): yes  Pharmacy phone number:      Details to clarify the request:Pt called requesting a roc back in regards to not reciveing medication      Sean Zelaya

## 2019-11-04 ENCOUNTER — HOSPITAL ENCOUNTER (OUTPATIENT)
Dept: PHYSICAL THERAPY | Age: 49
Discharge: HOME | End: 2019-11-04
Payer: MEDICAID

## 2019-11-04 PROCEDURE — 97110 THERAPEUTIC EXERCISES: CPT | Performed by: PHYSICAL THERAPIST

## 2019-11-04 NOTE — PROGRESS NOTES
PT DAILY TREATMENT NOTE - [facility] 2-15    Patient Name: Juan Tan  Date:2019  : 1970  [x]  Patient  Verified  Payor: [facility] MEDICAID / Plan: Maria Eugenia 46 / Product Type: Managed Care Medicaid /    In time:205 P   Out time:310 P   Total Treatment Time (min): 65  Total Timed Codes (min)55  Visit #8  Treatment Area: Bilateral knee pain [M25.561, M25.562]    SUBJECTIVE  Pain Level (0-10 scale)6  Any medication changes, allergies to medications, adverse drug reactions, diagnosis change, or new procedure performed?: [x] No    [] Yes (see summary sheet for update)  Subjective functional status/changes:       Pt reports he has good and bad days with the arthritis, but overall feels ready to continue on his own at home at the gym. OBJECTIVE  [x] Skin assessment post-treatment:  [x]intact []redness- no adverse reaction    []redness - adverse reaction:      55 min Therapeutic Exercise:  [x] See flow sheet :   Rationale: increase ROM, increase strength and improve functional mobility to improve the patients ability to walk and climb stairs      10 min [x]  Ice     []  Heat  Position:supine, roberto LE's supported    Location: roberto knees   Rationale: decrease edema, decrease inflammation, decrease pain and reduce soreness post therapy in order to improve the patients ability to perform ADL's.           omit min Manual Therapy: ROBERTO   Femoral posterior glide grade III-IV. PROM knee ext. PROM flexion. Patellar mobs sup/inf and med/lat   Rationale: decrease pain, increase ROM, increase tissue extensibility, decrease trigger points and improve joint mobility to improve the patients ability to climb stairs        With   [x] TE   [] manual   [] self care Patient Education: [x] Review HEP    [] Progressed/Changed HEP based on:   [] positioning   [] body mechanics   [] transfers   [] heat/ice application    [x] other:HEP reviewed.   Pt given this therapist's contact phone and e-mail and advised to call with any questions or concerns in the future. Other Objective/Functional Measures:       Pain Level (0-10 scale) post treatment1    ASSESSMENT         Progress towards goals / Updated goals:  Please see D/C note    PLAN  []  Upgrade activities as tolerated     [x]  Continue plan of care  []  Update interventions per flow sheet       [x]  Discharge due to:_   [x]  Other: Please see D/C note      Olive Montemayor.  KALPANA Melendez, DPT, CMTPT    49/6/9120    PT License Number: [id number]

## 2019-11-05 NOTE — ANCILLARY DISCHARGE INSTRUCTIONS
[facility]   [address] [facility], [address]  Phone: [phone] Fax: [phone]      Discharge Summary 2-15      Patient name: Radha Bajwa  : 1970    Provider#: [id number]  Referral source: Tulio Hinds MD      Medical/Treatment Diagnosis: Bilateral knee pain [M25.561, M25.562]     Prior Hospitalization: see medical history     Comorbidities: see evaluation  Prior Level of Function:see evaluation  Medications: Verified on Patient Summary List    Start of Care: 19    Onset Date:see evaluation   Visits from Start of Care: 8   Missed Visits:see connect care  Reporting Period : 19  to 19    Goal Status  Progress towards goals / Updated goals:  Short Term Goals: To be accomplished in 5 treatments:  -Independent in HEP as evidenced on ability to perform at least 5 exercises from HEP using proper form without verbal cuing. 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 Pt has shown improvement in ROM, strength and ability to return to gym . Pt has met/is progressing toward all  of goals. Pt ready for D/C to HEP.     RECOMMENDATIONS:  [x]Discontinue therapy:[x]Patient has reached or is progressing toward set goals     []Patient is non-compliant or has abdicated     []Due to lack of appreciable progress towards set goals    Renae Peng.  Nora, PT, DPT, CMTPT  11/5/2019 1:15 PM

## 2019-11-13 ENCOUNTER — TELEPHONE (OUTPATIENT)
Dept: RHEUMATOLOGY | Age: 49
End: 2019-11-13

## 2019-11-13 DIAGNOSIS — L40.50 PSORIATIC ARTHRITIS (HCC): ICD-10-CM

## 2019-11-13 NOTE — TELEPHONE ENCOUNTER
----- Message from [address] sent at 11/13/2019  4:18 PM EST -----  Regarding: Dr. Randi Edmonds first and last name: Author Ling  Reason for call: Pt advised that [facility] approved the secukinumab and would like another prescription sent to [facility] required yes/no and why: Yes  Best contact number(s): [phone]  Details to clarify the request:    [address]

## 2019-11-13 NOTE — TELEPHONE ENCOUNTER
----- Message from [address] sent at 11/13/2019  4:18 PM EST -----  Regarding: Dr. Aviles Co first and last name: Doreen Hernández  Reason for call: Pt advised that [facility] approved the secukinumab and would like another prescription sent to [facility] required yes/no and why: Yes  Best contact number(s): [phone]  Details to clarify the request:    [address]

## 2019-11-17 LAB
Lab: NORMAL
REFERENCE LAB,REFLB: NORMAL
TEST DESCRIPTION:,ATST: NORMAL

## 2019-11-19 ENCOUNTER — TELEPHONE (OUTPATIENT)
Dept: RHEUMATOLOGY | Age: 49
End: 2019-11-19

## 2019-11-20 NOTE — TELEPHONE ENCOUNTER
Patient informed a new prescription was sent in to [facility] for Cosentyx, and he will need a prior auth. Informed the office will work on the prior auth when received.

## 2019-11-26 ENCOUNTER — OFFICE VISIT (OUTPATIENT)
Dept: FAMILY MEDICINE CLINIC | Age: 49
End: 2019-11-26

## 2019-11-26 VITALS
RESPIRATION RATE: 17 BRPM | HEIGHT: 69 IN | HEART RATE: 77 BPM | SYSTOLIC BLOOD PRESSURE: 103 MMHG | BODY MASS INDEX: 31.76 KG/M2 | WEIGHT: 214.4 LBS | DIASTOLIC BLOOD PRESSURE: 71 MMHG | OXYGEN SATURATION: 95 % | TEMPERATURE: 98.7 F

## 2019-11-26 DIAGNOSIS — L40.9 PSORIASIS: ICD-10-CM

## 2019-11-26 DIAGNOSIS — Z00.00 ENCOUNTER FOR WELLNESS EXAMINATION: Primary | ICD-10-CM

## 2019-11-26 DIAGNOSIS — K06.8 BLEEDING GUMS: ICD-10-CM

## 2019-11-26 DIAGNOSIS — Z79.60 LONG-TERM USE OF IMMUNOSUPPRESSANT MEDICATION: ICD-10-CM

## 2019-11-26 DIAGNOSIS — Z13.31 SCREENING FOR DEPRESSION: ICD-10-CM

## 2019-11-26 DIAGNOSIS — L40.50 PSORIATIC ARTHRITIS (HCC): ICD-10-CM

## 2019-11-26 DIAGNOSIS — Z76.89 ENCOUNTER TO ESTABLISH CARE: ICD-10-CM

## 2019-11-26 DIAGNOSIS — M17.0 PRIMARY OSTEOARTHRITIS OF BOTH KNEES: ICD-10-CM

## 2019-11-26 DIAGNOSIS — E66.09 CLASS 1 OBESITY DUE TO EXCESS CALORIES WITH SERIOUS COMORBIDITY AND BODY MASS INDEX (BMI) OF 31.0 TO 31.9 IN ADULT: ICD-10-CM

## 2019-11-26 DIAGNOSIS — E78.5 HYPERLIPIDEMIA, UNSPECIFIED HYPERLIPIDEMIA TYPE: ICD-10-CM

## 2019-11-26 DIAGNOSIS — Z79.1 LONG TERM CURRENT USE OF NON-STEROIDAL ANTI-INFLAMMATORIES (NSAID): ICD-10-CM

## 2019-11-26 DIAGNOSIS — E11.9 TYPE 2 DIABETES MELLITUS WITHOUT COMPLICATION, WITHOUT LONG-TERM CURRENT USE OF INSULIN (HCC): ICD-10-CM

## 2019-11-26 DIAGNOSIS — Z91.89 10 YEAR RISK OF MI OR STROKE 7.5% OR GREATER: ICD-10-CM

## 2019-11-26 DIAGNOSIS — Z86.69 HISTORY OF POST-TRAUMATIC HEADACHE: ICD-10-CM

## 2019-11-26 DIAGNOSIS — M79.10 MYALGIA: ICD-10-CM

## 2019-11-26 DIAGNOSIS — Z86.69 HISTORY OF CATARACT: ICD-10-CM

## 2019-11-26 DIAGNOSIS — Z72.0 TOBACCO USE: ICD-10-CM

## 2019-11-26 LAB — HBA1C MFR BLD HPLC: 7.7 %

## 2019-11-26 RX ORDER — AMITRIPTYLINE HYDROCHLORIDE 50 MG/1
TABLET, FILM COATED ORAL
Refills: 3 | COMMUNITY
Start: 2019-10-08 | End: 2021-03-31

## 2019-11-26 RX ORDER — IBUPROFEN 200 MG
TABLET ORAL
Refills: 2 | COMMUNITY
Start: 2019-10-29 | End: 2021-03-31

## 2019-11-26 RX ORDER — EMPAGLIFLOZIN 10 MG/1
TABLET, FILM COATED ORAL
Refills: 5 | COMMUNITY
Start: 2019-10-29 | End: 2021-04-05 | Stop reason: SDUPTHER

## 2019-11-26 RX ORDER — CELECOXIB 200 MG/1
CAPSULE ORAL
Refills: 1 | COMMUNITY
Start: 2019-10-29 | End: 2020-09-16 | Stop reason: SDUPTHER

## 2019-11-26 RX ORDER — METFORMIN HYDROCHLORIDE 500 MG/1
500 TABLET, EXTENDED RELEASE ORAL
Qty: 90 TAB | Refills: 1 | Status: SHIPPED | OUTPATIENT
Start: 2019-11-26 | End: 2020-06-10

## 2019-11-26 NOTE — PROGRESS NOTES
Chief Complaint   Patient presents with    Error     Patient transferred to Dr. Mamie Cohn's schedule as he did not want to see a nurse practitioner for his care.

## 2019-11-26 NOTE — PROGRESS NOTES
Chief Complaint   Patient presents with    New Patient     establish care    Other     blurred vision in left eye     Other     Gum pain      1. Have you been to the ER, urgent care clinic since your last visit? Hospitalized since your last visit? No    2. Have you seen or consulted any other health care providers outside of the [address] [place] [address] since your last visit? Include any pap smears or colon screening.  No

## 2019-11-26 NOTE — PATIENT INSTRUCTIONS
Weight Loss Tips:  Remember this is like a part time job so your motivation and commitment is key to your success. Mindset  Weight loss like any other behavior change starts in your mind. Think hard about what your motivates you to lose weight then meditate on that. Remind yourself of your motivation  with phone alarms, scheduled meditation time, vision board, journal- just to name a few ideas. Have realistic goals. We expect with diligent healthy diet and physical activity you can lose 5% of your body weight in 3  months. Wt in lbs x 0.05 = #lbs you should lose in 3 months. Make food and activity changes with a goal of CONSISTENCY not perfection. Food  Start eating differently. Most of your weight loss and gain is from what you eat. Use small plates only  Drink 2 liters (1/2 gallon) of water every day  HALF of every meal should be fruit or vegetables  Try meal prepping on Sunday (or your day off) with new different vegetables. Consider meal prep service such as Cleaneatz.com, wepremeals. com  Replace soda with diet soda or other zero sugar drinks (selter water just fine)  Consider using the [employer] dereck for calorie counting. Goal 0207-3068 calories per day    Activity  Staying physically active will help you lose more weight and can help you get over the plateau when you weight just  won't change any more with diet. Start exercise at least 5 days per week for 40 minutes. Consider [employer] training dereck for home exercises. You can start with walking. I suggest walking at a speed of at least 3.5-4.5mph to for the weight loss benefit. Increase  your speed or distance every 2 weeks. Do some slow stretching daily of legs, arms and back. Consider adding weight training with light weights at home or at the gym. See a doctor or a physical training for  instructions in order to avoid injuries from doing muscle training incorrectly.          Learning About Diabetes Food Guidelines  Your Care Instructions    Meal planning is important to manage diabetes. It helps keep your blood sugar at a target level (which you set with your doctor). You don't have to eat special foods. You can eat what your family eats, including sweets once in a while. But you do have to pay attention to how often you eat and how much you eat of certain foods. You may want to work with a dietitian or a certified diabetes educator (CDE) to help you plan meals and snacks. A dietitian or CDE can also help you lose weight if that is one of your goals. What should you know about eating carbs? Managing the amount of carbohydrate (carbs) you eat is an important part of healthy meals when you have diabetes. Carbohydrate is found in many foods. · Learn which foods have carbs. And learn the amounts of carbs in different foods. ? Bread, cereal, pasta, and rice have about 15 grams of carbs in a serving. A serving is 1 slice of bread (1 ounce), ½ cup of cooked cereal, or 1/3 cup of cooked pasta or rice. ? Fruits have 15 grams of carbs in a serving. A serving is 1 small fresh fruit, such as an apple or orange; ½ of a banana; ½ cup of cooked or canned fruit; ½ cup of fruit juice; 1 cup of melon or raspberries; or 2 tablespoons of dried fruit. ? Milk and no-sugar-added yogurt have 15 grams of carbs in a serving. A serving is 1 cup of milk or 2/3 cup of no-sugar-added yogurt. ? Starchy vegetables have 15 grams of carbs in a serving. A serving is ½ cup of mashed potatoes or sweet potato; 1 cup winter squash; ½ of a small baked potato; ½ cup of cooked beans; or ½ cup cooked corn or green peas. · Learn how much carbs to eat each day and at each meal. A dietitian or CDE can teach you how to keep track of the amount of carbs you eat. This is called carbohydrate counting. · If you are not sure how to count carbohydrate grams, use the Plate Method to plan meals.  It is a good, quick way to make sure that you have a balanced meal. It also helps you spread carbs throughout the day. ? Divide your plate by types of foods. Put non-starchy vegetables on half the plate, meat or other protein food on one-quarter of the plate, and a grain or starchy vegetable in the final quarter of the plate. To this you can add a small piece of fruit and 1 cup of milk or yogurt, depending on how many carbs you are supposed to eat at a meal.  · Try to eat about the same amount of carbs at each meal. Do not \"save up\" your daily allowance of carbs to eat at one meal.  · Proteins have very little or no carbs per serving. Examples of proteins are beef, chicken, turkey, fish, eggs, tofu, cheese, cottage cheese, and peanut butter. A serving size of meat is 3 ounces, which is about the size of a deck of cards. Examples of meat substitute serving sizes (equal to 1 ounce of meat) are 1/4 cup of cottage cheese, 1 egg, 1 tablespoon of peanut butter, and ½ cup of tofu. How can you eat out and still eat healthy? · Learn to estimate the serving sizes of foods that have carbohydrate. If you measure food at home, it will be easier to estimate the amount in a serving of restaurant food. · If the meal you order has too much carbohydrate (such as potatoes, corn, or baked beans), ask to have a low-carbohydrate food instead. Ask for a salad or green vegetables. · If you use insulin, check your blood sugar before and after eating out to help you plan how much to eat in the future. · If you eat more carbohydrate at a meal than you had planned, take a walk or do other exercise. This will help lower your blood sugar. What else should you know? · Limit saturated fat, such as the fat from meat and dairy products. This is a healthy choice because people who have diabetes are at higher risk of heart disease. So choose lean cuts of meat and nonfat or low-fat dairy products. Use olive or canola oil instead of butter or shortening when cooking. · Don't skip meals.  Your blood sugar may drop too low if you skip meals and take insulin or certain medicines for diabetes. · Check with your doctor before you drink alcohol. Alcohol can cause your blood sugar to drop too low. Alcohol can also cause a bad reaction if you take certain diabetes medicines. Follow-up care is a key part of your treatment and safety. Be sure to make and go to all appointments, and call your doctor if you are having problems. It's also a good idea to know your test results and keep a list of the medicines you take. Where can you learn more? Go to http://vishal-bertin.info/. Enter P369 in the search box to learn more about \"Learning About Diabetes Food Guidelines. \"  Current as of: April 16, 2019  Content Version: 12.2  © 9108-9989 [employer]. Care instructions adapted under license by [employer] (which disclaims liability or warranty for this information). If you have questions about a medical condition or this instruction, always ask your healthcare professional. [address] any warranty or liability for your use of this information. Well Visit, Ages 25 to 48: Care Instructions  Your Care Instructions    Physical exams can help you stay healthy. Your doctor has checked your overall health and may have suggested ways to take good care of yourself. He or she also may have recommended tests. At home, you can help prevent illness with healthy eating, regular exercise, and other steps. Follow-up care is a key part of your treatment and safety. Be sure to make and go to all appointments, and call your doctor if you are having problems. It's also a good idea to know your test results and keep a list of the medicines you take. How can you care for yourself at home? · Reach and stay at a healthy weight. This will lower your risk for many problems, such as obesity, diabetes, heart disease, and high blood pressure. · Get at least 30 minutes of physical activity on most days of the week. Walking is a good choice. You also may want to do other activities, such as running, swimming, cycling, or playing tennis or team sports. Discuss any changes in your exercise program with your doctor. · Do not smoke or allow others to smoke around you. If you need help quitting, talk to your doctor about stop-smoking programs and medicines. These can increase your chances of quitting for good. · Talk to your doctor about whether you have any risk factors for sexually transmitted infections (STIs). Having one sex partner (who does not have STIs and does not have sex with anyone else) is a good way to avoid these infections. · Use birth control if you do not want to have children at this time. Talk with your doctor about the choices available and what might be best for you. · Protect your skin from too much sun. When you're outdoors from 10 a.m. to 4 p.m., stay in the shade or cover up with clothing and a hat with a wide brim. Wear sunglasses that block UV rays. Even when it's cloudy, put broad-spectrum sunscreen (SPF 30 or higher) on any exposed skin. · See a dentist one or two times a year for checkups and to have your teeth cleaned. · Wear a seat belt in the car. Follow your doctor's advice about when to have certain tests. These tests can spot problems early. For everyone  · Cholesterol. Have the fat (cholesterol) in your blood tested after age 21. Your doctor will tell you how often to have this done based on your age, family history, or other things that can increase your risk for heart disease. · Blood pressure. Have your blood pressure checked during a routine doctor visit. Your doctor will tell you how often to check your blood pressure based on your age, your blood pressure results, and other factors. · Vision. Talk with your doctor about how often to have a glaucoma test.  · Diabetes. Ask your doctor whether you should have tests for diabetes. · Colon cancer.  Your risk for colorectal cancer gets higher as you get older. Some experts say that adults should start regular screening at age 48 and stop at age 76. Others say to start before age 48 or continue after age 76. Talk with your doctor about your risk and when to start and stop screening. For women  · Breast exam and mammogram. Talk to your doctor about when you should have a clinical breast exam and a mammogram. Medical experts differ on whether and how often women under 50 should have these tests. Your doctor can help you decide what is right for you. · Cervical cancer screening test and pelvic exam. Begin with a Pap test at age 24. The test often is part of a pelvic exam. Starting at age 27, you may choose to have a Pap test, an HPV test, or both tests at the same time (called co-testing). Talk with your doctor about how often to have testing. · Tests for sexually transmitted infections (STIs). Ask whether you should have tests for STIs. You may be at risk if you have sex with more than one person, especially if your partners do not wear condoms. For men  · Tests for sexually transmitted infections (STIs). Ask whether you should have tests for STIs. You may be at risk if you have sex with more than one person, especially if you do not wear a condom. · Testicular cancer exam. Ask your doctor whether you should check your testicles regularly. · Prostate exam. Talk to your doctor about whether you should have a blood test (called a PSA test) for prostate cancer. Experts differ on whether and when men should have this test. Some experts suggest it if you are older than 39 and are -American or have a father or brother who got prostate cancer when he was younger than 72. When should you call for help? Watch closely for changes in your health, and be sure to contact your doctor if you have any problems or symptoms that concern you. Where can you learn more? Go to http://vishal-bertin.info/.   Enter P072 in the search box to learn more about \"Well Visit, Ages 25 to 48: Care Instructions. \"  Current as of: December 13, 2018  Content Version: 12.2  © 4450-1856 [employer]. Care instructions adapted under license by [employer] (which disclaims liability or warranty for this information). If you have questions about a medical condition or this instruction, always ask your healthcare professional. [place] [address] any warranty or liability for your use of this information.

## 2019-11-26 NOTE — PROGRESS NOTES
[address] Note      Subjective:     Chief Complaint   Patient presents with    New Patient     establish care    Other     blurred vision in left eye     Other     Gum pain      Jordon Arzate is a 51y.o. year old male who presents for evaluation of the following:      Establishment of Care:  Previous PCP: Dr. Virginia Mcqueen at [facility]  Patient Care Team:  Parth Augustin MD as PCP - General (Internal Medicine)  Fady Mendez MD as Physician (Rheumatology)  Jah Merrill MD as Physician (Dermatology)  Champ Thompson MD as Physician (Orthopedic Surgery)   Dentist- [facility]  Optho- [facility]      PMH:   Diabetes:   Chronic for over 10 year  Tx: glimepiride, jardiance, januvia  Previous Tx: Keary Done, Insulin  No home monitoring- needs  New glucometer  Lab Results   Component Value Date/Time    Hemoglobin A1c 7.0 (H) 05/16/2017 10:04 AM    Hemoglobin A1c (POC) 7.7 11/26/2019 04:00 PM       Smoker:   Cigarettes 1 pack per week  Prevous Use 1 pack per day  Cigars  Onset age 12 yo  Tx: Nicotine patch    Psoriasis/ Psoriatic Arthritis:    Tx: Celebrex, cosyntex, methotrexate, folic acid  Rash Location  Pain Location: '  Managed by derm and rheum Dr. Joey Omer    History Head Injury:   Occurred 4/2019  Had pain at night inhead  Tx: amitriptyline 50mg    Obesity  \"Does not bother me\"  Diet: unrestricted  Activity: None  Last Weight Metrics:  Weight Loss Metrics 8/29/2019 6/28/2019 5/29/2019 4/29/2019 3/2/2016 1/26/2016 11/11/2015   Today's Wt 210 lb 217 lb 216 lb 175 lb 175 lb 184 lb 185 lb   BMI 31.01 kg/m2 32.05 kg/m2 31.9 kg/m2 25.84 kg/m2 25.83 kg/m2 27.16 kg/m2 27.31 kg/m2       Muscle Pain   Worse at night  Sent for evaluation by ortho, treated with injection into arm  - this is still pendinb    Acute:   Vision Loss/ History of Catract Surgery  Onset > 1 year ago  \"I am losing my vision\"   Slowly      Gum Issue:  States gums bleed with brushing  No current pain  Requests antibiotic. Has appt for dentist planned in a coudple months      Social:   Employment- works as [employer]   Lives wife and 3 children. Health Maintenance:   Health Maintenance   Topic Date Due    DTaP/Tdap/Td series (1 - Tdap) 10/25/1982    MICROALBUMIN Q1  01/18/2011    EYE EXAM RETINAL OR DILATED  04/17/2014    FOOT EXAM Q1  11/25/2015    HEMOGLOBIN A1C Q6M  11/16/2017    LIPID PANEL Q1  06/14/2019    Influenza Age 5 to Adult  08/01/2019    Pneumococcal 0-64 years  Completed       HIV or other STD testing: Declined  Domestic Violence Screen:   Abuse Screening Questionnaire 11/26/2019   Do you ever feel afraid of your partner? N   Are you in a relationship with someone who physically or mentally threatens you? N   Is it safe for you to go home? Y       Depression Screen:   3 most recent PHQ Screens 11/26/2019   Little interest or pleasure in doing things Not at all   Feeling down, depressed, irritable, or hopeless Not at all   Total Score PHQ 2 0        reports being sexually active and has had partner(s) who are Female. Prostate Check:   No Fam Hx of prostate cancer  Denies groin pain/pulling, penile bleeding/discharge, dysuria, nighttime urination. Review of Systems   Pertinent positives and negative per HPI. All other systems  reviewed are negative for a Comprehensive ROS (10+).        Past Medical History:   Diagnosis Date    Arthritis     Diabetes ([facility] [address] 5/27/2009    Elevated liver enzymes     resolved     Hypercholesterolemia     Psoriasis 5/27/2009    Psoriatic arthritis ([facility] [address] 5/27/2009        Social History     Socioeconomic History    Marital status:      Spouse name: Not on file    Number of children: Not on file    Years of education: Not on file    Highest education level: Not on file   Occupational History    Not on file   Social Needs    Financial resource strain: Not on file    Food insecurity:     Worry: Not on file     Inability: Not on file    Transportation needs:     Medical: Not on file     Non-medical: Not on file   Tobacco Use    Smoking status: Current Every Day Smoker     Packs/day: 1.00     Years: 10.00     Pack years: 10.00     Types: Cigarettes    Smokeless tobacco: Never Used   Substance and Sexual Activity    Alcohol use: No    Drug use: No    Sexual activity: Yes     Partners: Female   Lifestyle    Physical activity:     Days per week: Not on file     Minutes per session: Not on file    Stress: Not on file   Relationships    Social connections:     Talks on phone: Not on file     Gets together: Not on file     Attends Mu-ism service: Not on file     Active member of club or organization: Not on file     Attends meetings of clubs or organizations: Not on file     Relationship status: Not on file    Intimate partner violence:     Fear of current or ex partner: Not on file     Emotionally abused: Not on file     Physically abused: Not on file     Forced sexual activity: Not on file   Other Topics Concern    Not on file   Social History Narrative    Not on file       Family History   Problem Relation Age of Onset    Diabetes Paternal Grandmother     No Known Problems Mother     Diabetes Father     Asthma Sister     No Known Problems Brother        Current Outpatient Medications   Medication Sig    amitriptyline (ELAVIL) 50 mg tablet TAKE 1 TABLET BY MOUTH EVERYDAY AT BEDTIME    celecoxib (CELEBREX) 200 mg capsule TAKE 1 CAPSULE BY MOUTH TWICE A DAY AS NEEDED FOR PAIN WITH FOOD    JARDIANCE 10 mg tablet TAKE 1 TABLET BY MOUTH EVERY DAY    nicotine (NICODERM CQ) 21 mg/24 hr APPLY 1 PATCH EVERY DAY AS DIRECTED    folic acid (FOLVITE) 1 mg tablet Take 1 Tab by mouth daily.  glimepiride (AMARYL) 2 mg tablet Take  by mouth every morning.  SITagliptin (JANUVIA) 100 mg tablet Take 100 mg by mouth daily.     glucose blood VI test strips (ASCENSIA AUTODISC VI, ONE TOUCH ULTRA TEST VI) strip Twice a day    Lancets Misc Bid as directed.  secukinumab (COSENTYX PEN, 2 PENS,) 150 mg/mL pnij 300 mg by SubCUTAneous route every four (4) weeks. Indications: psoriasis associated with arthritis    methotrexate (RHEUMATREX) 2.5 mg tablet Take 6 Tabs by mouth Every Saturday.  diphenoxylate-atropine (LOMOTIL) 2.5-0.025 mg per tablet Take 1 Tab by mouth four (4) times daily as needed for Diarrhea (1 tab after each stool for max 8 per day). Max Daily Amount: 4 Tabs. Take after each stool for a maximum of 8 tablets daily    FARXIGA 5 mg tab tablet TAKE 1 TABLET BY MOUTH EVERY DAY    clobetasol (TEMOVATE) 0.05 % ointment Apply  to affected area two (2) times a day. No current facility-administered medications for this visit. Objective:     Vitals:    11/26/19 1505   BP: 103/71   Pulse: 77   Resp: 17   Temp: 98.7 °F (37.1 °C)   TempSrc: Oral   SpO2: 95%   Weight: 214 lb 6.4 oz (97.3 kg)   Height: 5' 9\" (1.753 m)       Physical Examination:  General: Alert, cooperative, no distress, appears stated age. Obese  Eyes: Conjunctivae clear. PERRL, EOMs intact. Ears: Normal external ear canals both ears. TM clear and mobile bilaterally  Nose: Nares normal. Septum midline. Mucosa normal. No drainage or sinus tenderness. Mouth/Throat: Lips, mucosa, and tongue normal. No oropharyngeal erythema. No tonsillar enlargement or exudate.  - gums non tender   Neck: Supple, symmetrical, trachea midline, no adenopathy. No thyroid enlargement/tenderness/nodules  Respiratory: Breathing comfortably, in no acute respiratory distress. Clear to auscultation bilaterally. Normal inspiratory and expiratory ratio. Cardiovascular: Regular rate and rhythm, S1, S2 normal, no murmur, click, rub or gallop.   -Extremities no edema. Pulses 2+ and symmetric radial and DP   Abdomen: Soft, non-tender, not distended. Bowel sounds normal. No masses or organomegaly. MSK: Extremities normal, atraumatic, no effusion. Gait steady and unassisted. Skin: Skin color, texture, turgor normal. No rashes or lesions on exposed skin. Lymph nodes: Cervical, supraclavicular nodes normal.  Neurologic: CNII-XII intact. Strength 5/5 grossly. Sensation and reflexes normal throughout. Psychiatric: Affect appropriate. Mood euthymic. Thoughts logical. Speech volume and speed normal    Diabetic foot exam:   Left: Reflexes 2+     Vibratory sensation normal    Proprioception normal   Sharp/dull discrimination normal    Filament test normal sensation with micro filament  Right: Reflexes 2+   Vibratory sensation normal   Proprioception normal   Sharp/dull discrimination normal   Filament test normal sensation with micro filament    No visits with results within 3 Month(s) from this visit. Latest known visit with results is:   Hospital Outpatient Visit on 07/22/2019   Component Date Value Ref Range Status    Test Description: 07/20/2019 ENTERIC   Final    Reference Lab: 07/20/2019 [facility]    Final    Results: 07/20/2019 [facility] REPORT    Final    Results scanned into [facility] under Media tab           Assessment/ Plan:   Diagnoses and all orders for this visit:    1. Encounter for wellness examination    2. Encounter to establish care    3. Type 2 diabetes mellitus without complication, without long-term current use of insulin (HCC)  -     metFORMIN ER (GLUCOPHAGE XR) 500 mg tablet; Take 1 Tab by mouth daily (with dinner). -     METABOLIC PANEL, COMPREHENSIVE  -     AMB POC HEMOGLOBIN A1C  -      DIABETES FOOT EXAM  -     MICROALBUMIN, UR, RAND W/ MICROALB/CREAT RATIO    4. Hyperlipidemia, unspecified hyperlipidemia type  -     LIPID PANEL    5. Tobacco use    6. Psoriatic arthritis ([address]    7. Primary osteoarthritis of both knees    8. Psoriasis    9. Long term current use of non-steroidal anti-inflammatories (NSAID)    10. Long-term use of immunosuppressant medication    11.  Class 1 obesity due to excess calories with serious comorbidity and body mass index (BMI) of 31.0 to 31.9 in adult    12. Myalgia    13. History of post-traumatic headache    14. History of cataract    15. Bleeding gums    16. Screening for depression      PMH reviewed per orders. Meds refilled for chronic conditions per orders. Previous records requested/ reviewed. Labs to eval end organ function and etiology of chronic/acute concerns. Relevant vaccine, cancer screening and other health maintenance reviewed and updated per orders. Diabetes not well controlled. Add metformin. Foot exam normal. List of local opthalmologists provided. Diet and lifestyle modification encouraged for weight loss and chronic disease prevention/ management. Exercises and NSAID for MSK concern- myalgia. Follow up with specialists per routine- optho, dentist, ortho. Negative depression screen. The patient was counseled for 3 minutes on the dangers of tobacco use, and was advised to quit and reluctant to quit. Reviewed strategies to maximize success, including removing cigarettes and smoking materials from environment, stress management and pharmacotherapy (currently using nicotine patch). Educated patient on red flag symptoms to warrant return to clinic or emergency room visit. I have discussed the diagnosis with the patient and the intended plan as seen in the above orders. The patient has been offered or received an after-visit summary and questions were answered concerning future plans. I have discussed medication side effects and warnings with the patient as well. Follow-up and Dispositions    · Return in about 3 months (around 2/26/2020) for Follow Up diabetes.        Signed,    Vannesa Bennett MD  11/26/2019

## 2019-11-27 LAB
ALBUMIN SERPL-MCNC: 4.7 G/DL (ref 3.5–5.5)
ALBUMIN/GLOB SERPL: 1.6 {RATIO} (ref 1.2–2.2)
ALP SERPL-CCNC: 117 IU/L (ref 39–117)
ALT SERPL-CCNC: 14 IU/L (ref 0–44)
AST SERPL-CCNC: 11 IU/L (ref 0–40)
BILIRUB SERPL-MCNC: 0.3 MG/DL (ref 0–1.2)
BUN SERPL-MCNC: 19 MG/DL (ref 6–24)
BUN/CREAT SERPL: 22 (ref 9–20)
CALCIUM SERPL-MCNC: 9.3 MG/DL (ref 8.7–10.2)
CHLORIDE SERPL-SCNC: 103 MMOL/L (ref 96–106)
CHOLEST SERPL-MCNC: 260 MG/DL (ref 100–199)
CO2 SERPL-SCNC: 17 MMOL/L (ref 20–29)
CREAT SERPL-MCNC: 0.85 MG/DL (ref 0.76–1.27)
GLOBULIN SER CALC-MCNC: 2.9 G/DL (ref 1.5–4.5)
GLUCOSE SERPL-MCNC: 217 MG/DL (ref 65–99)
HDLC SERPL-MCNC: 42 MG/DL
INTERPRETATION, 910389: NORMAL
LDLC SERPL CALC-MCNC: 185 MG/DL (ref 0–99)
POTASSIUM SERPL-SCNC: 4.1 MMOL/L (ref 3.5–5.2)
PROT SERPL-MCNC: 7.6 G/DL (ref 6–8.5)
SODIUM SERPL-SCNC: 141 MMOL/L (ref 134–144)
TRIGL SERPL-MCNC: 163 MG/DL (ref 0–149)
VLDLC SERPL CALC-MCNC: 33 MG/DL (ref 5–40)

## 2019-11-27 RX ORDER — LANCETS
EACH MISCELLANEOUS
Qty: 100 EACH | Refills: 11 | Status: SHIPPED | OUTPATIENT
Start: 2019-11-27 | End: 2022-03-07

## 2019-11-27 RX ORDER — INSULIN PUMP SYRINGE, 3 ML
EACH MISCELLANEOUS
Qty: 1 KIT | Refills: 0 | Status: SHIPPED | OUTPATIENT
Start: 2019-11-27 | End: 2021-11-08 | Stop reason: SDUPTHER

## 2019-11-27 RX ORDER — ATORVASTATIN CALCIUM 20 MG/1
20 TABLET, FILM COATED ORAL DAILY
Qty: 90 TAB | Refills: 1 | Status: SHIPPED | OUTPATIENT
Start: 2019-11-27 | End: 2020-06-10

## 2019-11-27 NOTE — PROGRESS NOTES
Notify Patient:   Most of your test results are normal.  One of your cholesterol numbers was higher than normal.  This along with your smoking history and diabetes increases your risk of heart attack and stroke. Start taking a cholesterol medicine to improve this-I will send atorvastatin to your pharmacy. Try to avoid foods high in saturated fats such as processed meats, fried foods, and greasy snacks. Weight loss will also help with this.     MD Note:   ASCVD Risk 15.4%

## 2019-11-29 NOTE — PROGRESS NOTES
Call place to patient. Name and  verified. Advised patient of recent lab results. Advised of new medication being sent to the pharmacy for cholesterol. He verbalized understanding.

## 2019-12-09 ENCOUNTER — DOCUMENTATION ONLY (OUTPATIENT)
Dept: RHEUMATOLOGY | Age: 49
End: 2019-12-09

## 2019-12-09 NOTE — PROGRESS NOTES
Mailed patient the [facility] Patient Assistance forms for Cosentyx, and filled out Dr. Larry Yao prescription/enrollment forms for [facility].

## 2020-01-03 ENCOUNTER — OFFICE VISIT (OUTPATIENT)
Dept: RHEUMATOLOGY | Age: 50
End: 2020-01-03

## 2020-01-03 VITALS
BODY MASS INDEX: 30.66 KG/M2 | HEIGHT: 69 IN | DIASTOLIC BLOOD PRESSURE: 73 MMHG | WEIGHT: 207 LBS | RESPIRATION RATE: 18 BRPM | HEART RATE: 84 BPM | SYSTOLIC BLOOD PRESSURE: 106 MMHG | TEMPERATURE: 98 F

## 2020-01-03 DIAGNOSIS — L40.9 PSORIASIS: ICD-10-CM

## 2020-01-03 DIAGNOSIS — Z72.0 TOBACCO USE: ICD-10-CM

## 2020-01-03 DIAGNOSIS — Z79.60 LONG-TERM USE OF IMMUNOSUPPRESSANT MEDICATION: ICD-10-CM

## 2020-01-03 DIAGNOSIS — L40.50 PSORIATIC ARTHRITIS (HCC): Primary | ICD-10-CM

## 2020-01-03 NOTE — PROGRESS NOTES
REASON FOR VISIT    This is a follow-up visit for Mr. Jonh Alexander for     ICD-10-CM   1. P[facility]atic [facility] ([facility] L40.50   2. Psoriasis L40.9     Spondyloarthritis phenotype includes:  Anti-CCP positive: no  Rheumatoid factor positive: no  HLA-B27 positive: no  Inflammatory Back Pain: no  Erosive disease: no  Sacroiliitis: no  Ankylosis: no  Psoriasis: yes  Enthesitis: no  Dactylitis: no  Nail Pitting: no  Onycholysis: no  Splinter Hemorrhage: no  Extra-articular manifestations include: none  SAPHO: no    Immunosuppression Screening (5/29/2019): Quantiferon TB: negative  PPD:  Not performed  Hepatitis B: negative  Hepatitis C: negative    Therapy History includes:  Current NSAIDs include: Celebrex 100 mg twice daily  Current DMARD include: methotrexate 15 mg every Saturday (8/13/2014 to 5/27/2015; insurance coverage, 6/28/2019 to 7/28/2019; did not refill, 8/29/2019 to present), Cosentyx 300 mg every 30 days (6/2018 to 9/2019; loss of patient assistance, 1/03/2020)  Prior DMARD therapy includes: sulfasalazine 1000 mg twice daily (6/27/2012 to 8/13/2014; 12/02/2014 to 500 mg twice daily;10/02/2014 to 12/02/2014), Enbrel, Humira, Remicade, Otezla 30 mg twice daily (5/2015 to 6/2018), StelaraThe following DMARDs have been ineffective: sulfasalazine, methotrexate, Otezla, Enbrel, Humira, Remicade   The following DMARDs were stopped because of side effects: none    Active problems include:    Patient Active Problem List   Diagnosis Code    Diabetes ([facility] E11.9    Psoriasis L40.9    P[facility]atic [facility] ([facility] L40.50    Hyperlipidemia E78.5    Long-term use of immunosuppressant medication Z79.899    PPD positive R76.11    Tobacco use Z72.0    Long term current use of non-steroidal anti-inflammatories (NSAID) Z79.1    Primary osteoarthritis of both knees M17.0     HISTORY OF PRESENT ILLNESS    Mr. Jonh Alexander returns for a follow-up visit.     On his last visit, I resumed methotrexate 15 mg every Saturday and continued Cosentyx 300 mg every 30 days, but he has been off Cosentyx since 9/2019 due to loss of assistance coverage. Today, he feels worse in his DIPs and wrists and notes that his nails are worsening. to have pain in his hands, right elbow pain. He complains of pain in his left TMJ. He saw his PCP who felt it was from clenching. He smokes 1 PPD to more than than a pack. He endorses ankle swelling, intermittent dysphagia. He denies fever, weight loss, blurred vision, vision loss, ankle swelling, dry cough, dyspnea, nausea, vomiting, abdominal pain, black or bloody stool, fall since last visit, rash, easy bruising and increased thirst.    Mr. Sydnie Minor has continued his medications for arthritis and reports good tolerance without significant side effects. Most recent toxicity labs on 11/26/2019 revealed no abnormalities. The patient has not had any interval hospital admissions, infections, or surgeries. REVIEW OF SYSTEMS    A comprehensive review of systems was performed and pertinent results are documented in the HPI, review of systems is otherwise non-contributory. PAST MEDICAL HISTORY    He has a past medical history of Arthritis, Diabetes ([facility] (5/27/2009), Elevated liver enzymes, Hypercholesterolemia, Psoriasis (5/27/2009), and Psoriatic arthritis ([facility] (5/27/2009). FAMILY HISTORY    His family history includes Asthma in his sister; Diabetes in his father and paternal grandmother; No Known Problems in his brother and mother. SOCIAL HISTORY    He reports that he has been smoking cigarettes. He has a 10.00 pack-year smoking history. He has never used smokeless tobacco. He reports that he does not drink alcohol or use drugs.     IMMUNIZATIONS  Immunization History   Administered Date(s) Administered    H1N1 Influenza Virus Vaccine 10/27/2009    Influenza Vaccine Split 10/10/2012, 10/19/2012    Pneumococcal Polysaccharide (PPSV-23) 11/11/2015       MEDICATIONS    Current Outpatient Medications   Medication Sig Dispense Refill    secukinumab (COSENTYX PEN, 2 PENS,) 150 mg/mL pnij 300 mg by SubCUTAneous route every four (4) weeks. Indications: psoriasis associated with arthritis 4 Syringe 0    secukinumab (COSENTYX PEN, 2 PENS,) 150 mg/mL pnij 300 mg by SubCUTAneous route every four (4) weeks. Indications: psoriasis associated with arthritis 2 Syringe 0    Blood-Glucose Meter monitoring kit Use to check blood sugar daily. Dx E11.9. Fill per insurance formulary preference. 1 Kit 0    glucose blood VI test strips (BLOOD GLUCOSE TEST) strip Use to check blood sugar daily. Dx E11.9. Pharmacist to choose based on insurance/glucose monitoring kit type 100 Strip 5    lancets misc Use to check blood sugar daily. Dx E11.9. Fill per insurance formulary preference. 100 Each 11    atorvastatin (LIPITOR) 20 mg tablet Take 1 Tab by mouth daily. 90 Tab 1    amitriptyline (ELAVIL) 50 mg tablet TAKE 1 TABLET BY MOUTH EVERYDAY AT BEDTIME  3    celecoxib (CELEBREX) 200 mg capsule TAKE 1 CAPSULE BY MOUTH TWICE A DAY AS NEEDED FOR PAIN WITH FOOD  1    JARDIANCE 10 mg tablet TAKE 1 TABLET BY MOUTH EVERY DAY  5    nicotine (NICODERM CQ) 21 mg/24 hr APPLY 1 PATCH EVERY DAY AS DIRECTED  2    metFORMIN ER (GLUCOPHAGE XR) 500 mg tablet Take 1 Tab by mouth daily (with dinner). 90 Tab 1    methotrexate (RHEUMATREX) 2.5 mg tablet Take 6 Tabs by mouth Every Saturday. 72 Tab 0    folic acid (FOLVITE) 1 mg tablet Take 1 Tab by mouth daily. 90 Tab 0    glimepiride (AMARYL) 2 mg tablet Take 4 mg by mouth every morning.  SITagliptin (JANUVIA) 100 mg tablet Take 100 mg by mouth daily.       glucose blood VI test strips (ASCENSIA AUTODISC VI, ONE TOUCH ULTRA TEST VI) strip Twice a day 1 Package 12        ALLERGIES    Allergies   Allergen Reactions    Iodinated Contrast Media Shortness of Breath       PHYSICAL EXAMINATION    Visit Vitals  /73   Pulse 84   Temp 98 °F (36.7 °C)   Resp 18   Ht 5' 9\" (1.753 m) Wt 207 lb (93.9 kg)   BMI 30.57 kg/m²     Body mass index is 30.57 kg/m². General: Patient is alert, oriented x 3, not in acute distress    HEENT:   Sclerae are not injected and appear moist.  There is no alopecia. Cardiovascular:  Heart is regular rate and rhythm, no murmurs. Chest:  Lungs are clear to auscultation bilaterally. Extremities:  Free of clubbing, cyanosis, edema    Neurological exam:  Muscle strength is full in upper and lower extremities     Skin:    Psoriasis:     Yes (large patch posterior Achilles)  Nail Pitting:     no  Onycholysis:     Yes (LEFT: 1st, 3rd, 5th, RIGHT: 4th)  Splinter Hemorrhage:   Yes (LEFT: 5th)  Palmoplantar pustulosis:   no  Acne fulminans:    no  Acne conglobata:    no  Hidradenitis Suppurativa:   no  Dissecting cellulitis of the scalp:  no  Pilonidal sinus:    no  Erythema nodosum:    no    Musculoskeletal:  A comprehensive musculoskeletal exam was performed for all joints of each upper and lower extremity and assessed for swelling, tenderness and range of motion. Right elbow flexion deformity  Bilateral knee crepitus with some decrease ROM of the right knee     Costochondritis:  no   Synovitis:   yes (see table. Bilateral MTPs. DIP LEFT: 1st, 2nd, 3rd, 4th, 5th; RIGHT: 3rd)  Dactylitis:   no  Enthesitis:   no    Joint Count 1/3/2020 5/29/2019   Left wrist- Tender 1 -   Left wrist- Swollen 1 -   Left thumb IP - Tender - 1   Left thumb IP - Swollen - 0   Left 2nd PIP - Tender - 1   Left 2nd PIP - Swollen - 0   Left 4th PIP - Tender - 1   Left 4th PIP - Swollen - 0   Left 5th PIP - Tender - 1   Left 5th PIP - Swollen - 0   Right elbow - Tender 1 1   Right elbow - Swollen 1 1   Tender Joint Count (Total) 2 5   Swollen Joint Count (Total) 2 1     DATA REVIEW    Laboratory     Recent laboratory results were reviewed, summarized, and discussed with the patient.     Imaging    Musculoskeletal Ultrasound    None    Radiographs    Bilateral Hand 5/29/2019: LEFT: No fracture or dislocation on plain film. No joint space narrowing. No joint space erosion or periosteal reaction. Alignment is within normal limits. Bone mineralization is decreased. No soft tissue calcification. RIGHT: No fracture or dislocation on plain film. No joint space narrowing. There is no joint space erosion. There is subtle periostitis distal phalanx of the middle finger. Alignment is within normal limits. Bone mineralization is decreased. No soft tissue calcification. Chest 3/30/2019: Cardiac monitoring wires overlie the thorax. The cardiomediastinal and hilar contours are within normal limits. The pulmonary vasculature is within normal limits. The lungs and pleural spaces are clear. The visualized bones and upper abdomen are age-appropriate. Right Elbow 12/15/2014: a moderate right elbow effusion.  A nondisplaced fracture of the radial head is suspected. No dislocation is shown. CT Imaging    CT Head without contrast 9/10/2010: normal ventricles and basal cisterns. No intracranial masses or hemorrhages are seen. No focal intraparenchymal low density abnormalities are seen. MR Imaging    MRI Right Elbow without contrast 12/22/2014: there is a large complex appearing elbow joint effusion. There is  diffuse chondral thinning. Mild diffuse osseous edema is shown. The elbow collateral ligaments are intact. No tendon derangement is demonstrated. No soft tissue mass is shown. DXA     None    ASSESSMENT AND PLAN    This is a follow-up visit for Mr. Mary Beth Guerrero. 1) Psoriatic Arthritis. (psoriasis, arthritis) He is maintained on methotrexate 15 mg every Saturday and Cosentyx 300 mg monthly but has been off Cosentyx for about 4 months due to lack of assistance coverage. He brought his forms today. He reports worsening DIP arthritis and nail disease as a consequence. He has been tolerating methotrexate well.     He had a complex right elbow effusion noted on MRI in 12/22/2014 therefore, he may need surgical intervention to debride it, since it continues to be symptomatic. I had referred him to orthopedics, Dr. Stuart Willis for evaluation and treatment, who injected his elbow with good tolerance on 9/11/2019. We filled out the Cosentyx patient assistance forms today and faxed them. I gave him 3 months of 300 mg samples today with dose administered today. I will continue methotrexate 15 mg every Saturday. 2) Long Term Use of Immunosuppressants. The patient remains on immunomodulatory medications (methotrexate, Cosentyx) and requires frequent toxicity monitoring by blood work. 3) Bilateral Knee Osteoarthritis. Ihad  referred him to physical therapy. He declined injections. 4) Long Term Use of NSAIDs. The patient remains on Celebrex. His most recent renal function was normal.     5) Tobacco Use. He smokes 1 PPD. I counseled smoking cessation due to worsening prognosis of his PsA. The patient voiced understanding of the aforementioned assessment and plan. Summary of plan was provided in the After Visit Summary patient instructions.      TODAY'S ORDERS    Orders Placed This Encounter    secukinumab (COSENTYX PEN, 2 PENS,) 150 mg/mL pnij    secukinumab (COSENTYX PEN, 2 PENS,) 150 mg/mL pnij     Future Appointments   Date Time Provider Gale Brewer   2/26/2020  3:30 PM Carolyn Russell MD [facility]   4/7/2020 10:20 AM Evans Bermudez MD [place], [place], [address] [facility]    Adult Rheumatology   Rheumatology Ultrasound Certified  [facility]  [facility] of [facility], [address] [place] [address]   Phone [phone]  Fax [phone]

## 2020-01-03 NOTE — LETTER
1/3/20 Patient: Reno Ballesteros YOB: 1970 Date of Visit: 1/3/2020 Shayna Harrison MD 
[address] [facility] [id number] VIA In Basket Dear Shayna Harrison MD, Thank you for referring Mr. Jerry Bowles to [facility] for evaluation. My notes for this consultation are attached. If you have questions, please do not hesitate to call me. I look forward to following your patient along with you.  
 
 
Sincerely, 
 
Kaylan Hawkins MD

## 2020-01-17 ENCOUNTER — TELEPHONE (OUTPATIENT)
Dept: RHEUMATOLOGY | Age: 50
End: 2020-01-17

## 2020-01-17 DIAGNOSIS — L40.50 PSORIATIC ARTHRITIS (HCC): ICD-10-CM

## 2020-01-17 NOTE — TELEPHONE ENCOUNTER
Celio Manuel calling, Phone:  [phone], due to the Cosentyx will need a prior-authorization, through Jose Shultz, Phone: [phone], Fax: [phone]. If this is denied, this will need an appeal if it is available.

## 2020-01-20 DIAGNOSIS — L40.50 PSORIATIC ARTHRITIS (HCC): ICD-10-CM

## 2020-01-23 ENCOUNTER — DOCUMENTATION ONLY (OUTPATIENT)
Dept: RHEUMATOLOGY | Age: 50
End: 2020-01-23

## 2020-01-25 DIAGNOSIS — Z79.60 LONG-TERM USE OF IMMUNOSUPPRESSANT MEDICATION: ICD-10-CM

## 2020-01-25 DIAGNOSIS — L40.50 PSORIATIC ARTHRITIS (HCC): ICD-10-CM

## 2020-01-27 RX ORDER — METHOTREXATE 2.5 MG/1
15 TABLET ORAL
Qty: 72 TAB | Refills: 0 | Status: SHIPPED | OUTPATIENT
Start: 2020-02-01 | End: 2020-04-07 | Stop reason: SDUPTHER

## 2020-02-04 ENCOUNTER — OFFICE VISIT (OUTPATIENT)
Dept: FAMILY MEDICINE CLINIC | Age: 50
End: 2020-02-04

## 2020-02-04 VITALS
HEART RATE: 82 BPM | WEIGHT: 215 LBS | BODY MASS INDEX: 31.84 KG/M2 | RESPIRATION RATE: 17 BRPM | OXYGEN SATURATION: 95 % | TEMPERATURE: 98.2 F | SYSTOLIC BLOOD PRESSURE: 101 MMHG | DIASTOLIC BLOOD PRESSURE: 73 MMHG | HEIGHT: 69 IN

## 2020-02-04 DIAGNOSIS — E66.09 CLASS 1 OBESITY DUE TO EXCESS CALORIES WITH SERIOUS COMORBIDITY AND BODY MASS INDEX (BMI) OF 31.0 TO 31.9 IN ADULT: ICD-10-CM

## 2020-02-04 DIAGNOSIS — K21.9 GASTROESOPHAGEAL REFLUX DISEASE, ESOPHAGITIS PRESENCE NOT SPECIFIED: ICD-10-CM

## 2020-02-04 DIAGNOSIS — E11.9 TYPE 2 DIABETES MELLITUS WITHOUT COMPLICATION, WITHOUT LONG-TERM CURRENT USE OF INSULIN (HCC): Primary | ICD-10-CM

## 2020-02-04 DIAGNOSIS — M26.629 TMJ SYNDROME: ICD-10-CM

## 2020-02-04 RX ORDER — ACETAMINOPHEN 500 MG
500-1000 TABLET ORAL
Qty: 90 TAB | Refills: 0 | Status: SHIPPED | OUTPATIENT
Start: 2020-02-04 | End: 2020-06-13

## 2020-02-04 RX ORDER — PHENOL/SODIUM PHENOLATE
20 AEROSOL, SPRAY (ML) MUCOUS MEMBRANE DAILY
Qty: 30 TAB | Refills: 2 | Status: SHIPPED | OUTPATIENT
Start: 2020-02-04 | End: 2021-01-14 | Stop reason: DRUGHIGH

## 2020-02-04 NOTE — PATIENT INSTRUCTIONS
Learning About Diabetes Food Guidelines  Your Care Instructions    Meal planning is important to manage diabetes. It helps keep your blood sugar at a target level (which you set with your doctor). You don't have to eat special foods. You can eat what your family eats, including sweets once in a while. But you do have to pay attention to how often you eat and how much you eat of certain foods. You may want to work with a dietitian or a certified diabetes educator (CDE) to help you plan meals and snacks. A dietitian or CDE can also help you lose weight if that is one of your goals. What should you know about eating carbs? Managing the amount of carbohydrate (carbs) you eat is an important part of healthy meals when you have diabetes. Carbohydrate is found in many foods. · Learn which foods have carbs. And learn the amounts of carbs in different foods. ? Bread, cereal, pasta, and rice have about 15 grams of carbs in a serving. A serving is 1 slice of bread (1 ounce), ½ cup of cooked cereal, or 1/3 cup of cooked pasta or rice. ? Fruits have 15 grams of carbs in a serving. A serving is 1 small fresh fruit, such as an apple or orange; ½ of a banana; ½ cup of cooked or canned fruit; ½ cup of fruit juice; 1 cup of melon or raspberries; or 2 tablespoons of dried fruit. ? Milk and no-sugar-added yogurt have 15 grams of carbs in a serving. A serving is 1 cup of milk or 2/3 cup of no-sugar-added yogurt. ? Starchy vegetables have 15 grams of carbs in a serving. A serving is ½ cup of mashed potatoes or sweet potato; 1 cup winter squash; ½ of a small baked potato; ½ cup of cooked beans; or ½ cup cooked corn or green peas. · Learn how much carbs to eat each day and at each meal. A dietitian or CDE can teach you how to keep track of the amount of carbs you eat. This is called carbohydrate counting. · If you are not sure how to count carbohydrate grams, use the Plate Method to plan meals.  It is a good, quick way to make sure that you have a balanced meal. It also helps you spread carbs throughout the day. ? Divide your plate by types of foods. Put non-starchy vegetables on half the plate, meat or other protein food on one-quarter of the plate, and a grain or starchy vegetable in the final quarter of the plate. To this you can add a small piece of fruit and 1 cup of milk or yogurt, depending on how many carbs you are supposed to eat at a meal.  · Try to eat about the same amount of carbs at each meal. Do not \"save up\" your daily allowance of carbs to eat at one meal.  · Proteins have very little or no carbs per serving. Examples of proteins are beef, chicken, turkey, fish, eggs, tofu, cheese, cottage cheese, and peanut butter. A serving size of meat is 3 ounces, which is about the size of a deck of cards. Examples of meat substitute serving sizes (equal to 1 ounce of meat) are 1/4 cup of cottage cheese, 1 egg, 1 tablespoon of peanut butter, and ½ cup of tofu. How can you eat out and still eat healthy? · Learn to estimate the serving sizes of foods that have carbohydrate. If you measure food at home, it will be easier to estimate the amount in a serving of restaurant food. · If the meal you order has too much carbohydrate (such as potatoes, corn, or baked beans), ask to have a low-carbohydrate food instead. Ask for a salad or green vegetables. · If you use insulin, check your blood sugar before and after eating out to help you plan how much to eat in the future. · If you eat more carbohydrate at a meal than you had planned, take a walk or do other exercise. This will help lower your blood sugar. What else should you know? · Limit saturated fat, such as the fat from meat and dairy products. This is a healthy choice because people who have diabetes are at higher risk of heart disease. So choose lean cuts of meat and nonfat or low-fat dairy products.  Use olive or canola oil instead of butter or shortening when cooking. · Don't skip meals. Your blood sugar may drop too low if you skip meals and take insulin or certain medicines for diabetes. · Check with your doctor before you drink alcohol. Alcohol can cause your blood sugar to drop too low. Alcohol can also cause a bad reaction if you take certain diabetes medicines. Follow-up care is a key part of your treatment and safety. Be sure to make and go to all appointments, and call your doctor if you are having problems. It's also a good idea to know your test results and keep a list of the medicines you take. Where can you learn more? Go to http://vishal-bertin.info/. Enter M697 in the search box to learn more about \"Learning About Diabetes Food Guidelines. \"  Current as of: April 16, 2019  Content Version: 12.2  © 2302-2462 [employer]. Care instructions adapted under license by [employer] (which disclaims liability or warranty for this information). If you have questions about a medical condition or this instruction, always ask your healthcare professional. [place] [address] any warranty or liability for your use of this information. Temporomandibular Disorder: Care Instructions  Your Care Instructions    Temporomandibular (TM) disorders are a problem with the muscles and joints that connect your jaw to your skull. They cause pain when you open your mouth, chew, or yawn. You may feel this pain on one or both sides. TM disorders are often caused by tight jaw muscles. The tightness can be caused by clenching or grinding your teeth. This may happen when you have a lot of stress in your life. If you lower your stress, you may be able to stop clenching or grinding your teeth. This will help relax your jaw and reduce your pain. You may also be able to do some things at home to feel better.  But if none of this works, your doctor may prescribe medicine to help relax your muscles and control the pain.  Follow-up care is a key part of your treatment and safety. Be sure to make and go to all appointments, and call your doctor if you are having problems. It's also a good idea to know your test results and keep a list of the medicines you take. How can you care for yourself at home? · Put a warm, moist cloth or heating pad set on low on your jaw. Do this for 10 to 20 minutes at a time. Put a thin cloth between the heating pad and your skin. · Avoid hard or chewy foods that cause your jaws to work very hard. Examples include popcorn, jerky, tough meats, chewy breads, gum, and raw apples and carrots. · Choose softer foods that are easy to chew. These include eggs, yogurt, and soup. · Cut your food into small pieces. Chew slowly. · If your jaw gets too painful to chew, or if it locks, you may need to puree your food for a few days or weeks. · To relax your jaw, repeat this exercise for a few minutes every morning and evening. Watch yourself in a mirror. Gently open and close your mouth. Move your jaw straight up and down. But don't do this if it makes your pain worse. · Get at least 30 minutes of exercise on most days of the week to relieve stress. Walking is a good choice. You also may want to do other activities, such as running, swimming, cycling, or playing tennis or team sports. · Do not:  ? Hold a phone between your shoulder and your jaw. ? Open your mouth all the way, like when you sing loudly or yawn. ? Clench or grind your teeth, bite your lips, or chew your fingernails. ? Clench things such as pens, pipes, or cigars between your teeth. When should you call for help? Call your doctor now or seek immediate medical care if:    · Your jaw is locked open or shut or it is hard to move your jaw.    Watch closely for changes in your health, and be sure to contact your doctor if:    · Your jaw pain gets worse.     · Your face is swollen.     · You do not get better as expected.    Where can you learn more?  Go to http://vishal-bertin.info/. Enter Q620 in the search box to learn more about \"Temporomandibular Disorder: Care Instructions. \"  Current as of: October 3, 2018  Content Version: 12.2  © 7011-1530 [employer]. Care instructions adapted under license by [employer] (which disclaims liability or warranty for this information). If you have questions about a medical condition or this instruction, always ask your healthcare professional. [address] any warranty or liability for your use of this information. Gastroesophageal Reflux Disease (GERD): Care Instructions  Your Care Instructions    Gastroesophageal reflux disease (GERD) is the backward flow of stomach acid into the esophagus. The esophagus is the tube that leads from your throat to your stomach. A one-way valve prevents the stomach acid from moving up into this tube. When you have GERD, this valve does not close tightly enough. If you have mild GERD symptoms including heartburn, you may be able to control the problem with antacids or over-the-counter medicine. Changing your diet, losing weight, and making other lifestyle changes can also help reduce symptoms. Follow-up care is a key part of your treatment and safety. Be sure to make and go to all appointments, and call your doctor if you are having problems. It's also a good idea to know your test results and keep a list of the medicines you take. How can you care for yourself at home? · Take your medicines exactly as prescribed. Call your doctor if you think you are having a problem with your medicine. · Your doctor may recommend over-the-counter medicine. For mild or occasional indigestion, antacids, such as Tums, Gaviscon, Mylanta, or Maalox, may help. Your doctor also may recommend over-the-counter acid reducers, such as Pepcid AC, Tagamet HB, Zantac 75, or Prilosec. Read and follow all instructions on the label.  If you use these medicines often, talk with your doctor. · Change your eating habits. ? It's best to eat several small meals instead of two or three large meals. ? After you eat, wait 2 to 3 hours before you lie down. ? Chocolate, mint, and alcohol can make GERD worse. ? Spicy foods, foods that have a lot of acid (like tomatoes and oranges), and coffee can make GERD symptoms worse in some people. If your symptoms are worse after you eat a certain food, you may want to stop eating that food to see if your symptoms get better. · Do not smoke or chew tobacco. Smoking can make GERD worse. If you need help quitting, talk to your doctor about stop-smoking programs and medicines. These can increase your chances of quitting for good. · If you have GERD symptoms at night, raise the head of your bed 6 to 8 inches by putting the frame on blocks or placing a foam wedge under the head of your mattress. (Adding extra pillows does not work.)  · Do not wear tight clothing around your middle. · Lose weight if you need to. Losing just 5 to 10 pounds can help. When should you call for help? Call your doctor now or seek immediate medical care if:    · You have new or different belly pain.     · Your stools are black and tarlike or have streaks of blood.    Watch closely for changes in your health, and be sure to contact your doctor if:    · Your symptoms have not improved after 2 days.     · Food seems to catch in your throat or chest.   Where can you learn more? Go to http://vishal-bertin.info/. Enter V703 in the search box to learn more about \"Gastroesophageal Reflux Disease (GERD): Care Instructions. \"  Current as of: November 7, 2018  Content Version: 12.2  © 4895-0366 [employer]. Care instructions adapted under license by [employer] (which disclaims liability or warranty for this information).  If you have questions about a medical condition or this instruction, always ask your healthcare professional. [address] any warranty or liability for your use of this information.

## 2020-02-04 NOTE — PROGRESS NOTES
Chief Complaint   Patient presents with    GERD     wants a different medication     Ear Pain     1. Have you been to the ER, urgent care clinic since your last visit? Hospitalized since your last visit? No    2. Have you seen or consulted any other health care providers outside of the [address] [place] [address] since your last visit? Include any pap smears or colon screening.  No

## 2020-02-04 NOTE — PROGRESS NOTES
[facility] Note      Subjective:     Chief Complaint   Patient presents with    GERD     wants a different medication     Ear Pain     Rl Rodrigues is a 51y.o. year old male who presents for evaluation of the following:        PMH:   GERD:   Chronic > 1 year  Sx: back bash, burning  Trigger green tea , after eating, cooking, smoking  Tx: name unknown  - taken for 6 months  Obese  Prescribed by previous PCP    Face Pain:   Pain with openeing mouth  Left side anterior to ear  History of cerumen impaction recurring water irrigation  Seen by a [facility] provider/ dentist and told this was muscle problem and told to take methocarbamol fro this  Tx: methocarbamol  Denies ear ringing       Diabetes:   Chronic for over 10 year  Tx: glimepiride, jardiance, januvia, metfomrin  Previous Tx: Farxiga, Insulin  Home monitoring 120-240  Lab Results   Component Value Date/Time    Hemoglobin A1c 7.0 (H) 05/16/2017 10:04 AM    Hemoglobin A1c (POC) 7.7 11/26/2019 04:00 PM       Obesity  \"Does not bother me\"  Diet: unrestricted  Activity: None  Last Weight Metrics:  Weight Loss Metrics 2/4/2020 1/3/2020 11/26/2019 8/29/2019 6/28/2019 5/29/2019 4/29/2019   Today's Wt 215 lb 207 lb 214 lb 6.4 oz 210 lb 217 lb 216 lb 175 lb   BMI 31.75 kg/m2 30.57 kg/m2 31.66 kg/m2 31.01 kg/m2 32.05 kg/m2 31.9 kg/m2 25.84 kg/m2       Social:   Employment- works as [employer]   Lives wife and 3 children. Patient Care Team:  Trish Schirmer, MD as PCP - General (Family Practice)  Trish Schirmer, MD as PCP - [facility] Empaneled Provider  Sridhar Saini MD as Physician (Rheumatology)  Sussy Myrick MD as Physician (Dermatology)  Carson Brar MD as Physician (Orthopedic Surgery)   Dentist- [facility]  Optho- [facility]        Review of Systems   Pertinent positives and negative per HPI. All other systems  reviewed are negative for a Comprehensive ROS (10+).        Past Medical History:   Diagnosis Date    Arthritis     Diabetes ([facility] 5/27/2009    Elevated liver enzymes     resolved     Hypercholesterolemia     Psoriasis 5/27/2009    Psoriatic arthritis ([facility] 5/27/2009        Social History     Socioeconomic History    Marital status:      Spouse name: Not on file    Number of children: Not on file    Years of education: Not on file    Highest education level: Not on file   Occupational History    Not on file   Social Needs    Financial resource strain: Not on file    Food insecurity:     Worry: Not on file     Inability: Not on file    Transportation needs:     Medical: Not on file     Non-medical: Not on file   Tobacco Use    Smoking status: Current Every Day Smoker     Packs/day: 1.00     Years: 10.00     Pack years: 10.00     Types: Cigarettes    Smokeless tobacco: Never Used   Substance and Sexual Activity    Alcohol use: No    Drug use: No    Sexual activity: Yes     Partners: Female   Lifestyle    Physical activity:     Days per week: Not on file     Minutes per session: Not on file    Stress: Not on file   Relationships    Social connections:     Talks on phone: Not on file     Gets together: Not on file     Attends Nondenominational service: Not on file     Active member of club or organization: Not on file     Attends meetings of clubs or organizations: Not on file     Relationship status: Not on file    Intimate partner violence:     Fear of current or ex partner: Not on file     Emotionally abused: Not on file     Physically abused: Not on file     Forced sexual activity: Not on file   Other Topics Concern    Not on file   Social History Narrative    Not on file       Family History   Problem Relation Age of Onset    Diabetes Paternal Grandmother     No Known Problems Mother     Diabetes Father     Asthma Sister     No Known Problems Brother        Current Outpatient Medications   Medication Sig    methotrexate (RHEUMATREX) 2.5 mg tablet TAKE 6 TABS BY MOUTH EVERY SATURDAY.  secukinumab (COSENTYX PEN, 2 PENS,) 150 mg/mL pnij 300 mg by SubCUTAneous route every four (4) weeks. Indications: psoriasis associated with arthritis    secukinumab (COSENTYX PEN, 2 PENS,) 150 mg/mL pnij 300 mg by SubCUTAneous route every four (4) weeks. Indications: psoriasis associated with arthritis    secukinumab (COSENTYX PEN) 150 mg/mL pnij 150 mg by SubCUTAneous route every thirty (30) days.  Blood-Glucose Meter monitoring kit Use to check blood sugar daily. Dx E11.9. Fill per insurance formulary preference.  glucose blood VI test strips (BLOOD GLUCOSE TEST) strip Use to check blood sugar daily. Dx E11.9. Pharmacist to choose based on insurance/glucose monitoring kit type    lancets misc Use to check blood sugar daily. Dx E11.9. Fill per insurance formulary preference.  atorvastatin (LIPITOR) 20 mg tablet Take 1 Tab by mouth daily.  amitriptyline (ELAVIL) 50 mg tablet TAKE 1 TABLET BY MOUTH EVERYDAY AT BEDTIME    celecoxib (CELEBREX) 200 mg capsule TAKE 1 CAPSULE BY MOUTH TWICE A DAY AS NEEDED FOR PAIN WITH FOOD    JARDIANCE 10 mg tablet TAKE 1 TABLET BY MOUTH EVERY DAY    nicotine (NICODERM CQ) 21 mg/24 hr APPLY 1 PATCH EVERY DAY AS DIRECTED    metFORMIN ER (GLUCOPHAGE XR) 500 mg tablet Take 1 Tab by mouth daily (with dinner).  folic acid (FOLVITE) 1 mg tablet Take 1 Tab by mouth daily.  glimepiride (AMARYL) 2 mg tablet Take 4 mg by mouth every morning.  SITagliptin (JANUVIA) 100 mg tablet Take 100 mg by mouth daily.  glucose blood VI test strips (ASCENSIA AUTODISC VI, ONE TOUCH ULTRA TEST VI) strip Twice a day     No current facility-administered medications for this visit.               Objective:     Vitals:    02/04/20 1335   BP: 101/73   Pulse: 82   Resp: 17   Temp: 98.2 °F (36.8 °C)   TempSrc: Oral   SpO2: 95%   Weight: 215 lb (97.5 kg)   Height: 5' 9\" (1.753 m)       Physical Examination:  General: Alert, cooperative, no distress, appears stated age. Obese  Eyes: Conjunctivae clear. PERRL, EOMs intact. Ears: Normal external ear canals both ears. TM clear and mobile bilaterally  Nose: Nares normal. Septum midline. Mucosa normal. No drainage or sinus tenderness. Mouth/Throat: Lips, mucosa, and tongue normal. No oropharyngeal erythema. No tonsillar enlargement or exudate.  -Left TMJ area indicated for pain. Nontender. No crepitus or locking. Neck: Supple, symmetrical, trachea midline, no adenopathy. No thyroid enlargement/tenderness/nodules  Respiratory: Breathing comfortably, in no acute respiratory distress. Clear to auscultation bilaterally. Normal inspiratory and expiratory ratio. Cardiovascular: Regular rate and rhythm, S1, S2 normal, no murmur, click, rub or gallop.   -Extremities no edema. Pulses 2+ and symmetric radial and DP   Abdomen: Soft, non-tender, not distended. Bowel sounds normal. No masses or organomegaly. MSK: Extremities normal, atraumatic, no effusion. Gait steady and unassisted. Skin: Skin color, texture, turgor normal. No rashes or lesions on exposed skin. Lymph nodes: Cervical, supraclavicular nodes normal.  Neurologic: CNII-XII intact. Strength 5/5 grossly. Sensation and reflexes normal throughout. Psychiatric: Affect appropriate. Mood euthymic.        Office Visit on 11/26/2019   Component Date Value Ref Range Status    Cholesterol, total 11/26/2019 260* 100 - 199 mg/dL Final    Triglyceride 11/26/2019 163* 0 - 149 mg/dL Final    HDL Cholesterol 11/26/2019 42  >39 mg/dL Final    VLDL, calculated 11/26/2019 33  5 - 40 mg/dL Final    LDL, calculated 11/26/2019 185* 0 - 99 mg/dL Final    Hemoglobin A1c (POC) 11/26/2019 7.7  % Final    Glucose 11/26/2019 217* 65 - 99 mg/dL Final    BUN 11/26/2019 19  6 - 24 mg/dL Final    Creatinine 11/26/2019 0.85  0.76 - 1.27 mg/dL Final    GFR est non-AA 11/26/2019 103  >59 mL/min/1.73 Final    GFR est AA 11/26/2019 119  >59 mL/min/1.73 Final    BUN/Creatinine ratio 11/26/2019 22* 9 - 20 Final    Sodium 11/26/2019 141  134 - 144 mmol/L Final    Potassium 11/26/2019 4.1  3.5 - 5.2 mmol/L Final    Chloride 11/26/2019 103  96 - 106 mmol/L Final    CO2 11/26/2019 17* 20 - 29 mmol/L Final    Calcium 11/26/2019 9.3  8.7 - 10.2 mg/dL Final    Protein, total 11/26/2019 7.6  6.0 - 8.5 g/dL Final    Albumin 11/26/2019 4.7  3.5 - 5.5 g/dL Final    GLOBULIN, TOTAL 11/26/2019 2.9  1.5 - 4.5 g/dL Final    A-G Ratio 11/26/2019 1.6  1.2 - 2.2 Final    Bilirubin, total 11/26/2019 0.3  0.0 - 1.2 mg/dL Final    Alk. phosphatase 11/26/2019 117  39 - 117 IU/L Final    AST (SGOT) 11/26/2019 11  0 - 40 IU/L Final    ALT (SGPT) 11/26/2019 14  0 - 44 IU/L Final    INTERPRETATION 11/26/2019 Note   Final    Supplemental report is available. Assessment/ Plan:   Diagnoses and all orders for this visit:    1. Type 2 diabetes mellitus without complication, without long-term current use of insulin (HCC)  -     MICROALBUMIN, UR, RAND W/ MICROALB/CREAT RATIO    2. Gastroesophageal reflux disease, esophagitis presence not specified  -     Omeprazole delayed release (PRILOSEC D/R) 20 mg tablet; Take 1 Tab by mouth daily. 3. TMJ syndrome  -     acetaminophen (TYLENOL) 500 mg tablet; Take 1-2 Tabs by mouth three (3) times daily as needed for Pain. 4. Class 1 obesity due to excess calories with serious comorbidity and body mass index (BMI) of 31.0 to 31.9 in adult      PMH reviewed per orders. Labs to eval end organ function and etiology of chronic/acute concerns. Diabetes not well controlled. Continue current oral medication regimen. A1c due in 1 month. GERD, uncontrolled. Add Prilosec. If not improved on this regimen consider GI referral for endoscopy. Diet and lifestyle modification encouraged for weight loss and chronic disease prevention/ management. Exercises and tylneol for MSK concern-left TMJ syndrome.    Follow up with specialists per routine- optho, dentist, ortho.       Educated patient on red flag symptoms to warrant return to clinic or emergency room visit. I have discussed the diagnosis with the patient and the intended plan as seen in the above orders. The patient has been offered or received an after-visit summary and questions were answered concerning future plans. I have discussed medication side effects and warnings with the patient as well. Follow-up and Dispositions    · Return in about 4 weeks (around 3/3/2020) for Follow Up diabetes for A!C.          Signed,    Mando Underwood MD  2/4/2020

## 2020-02-06 LAB
ALBUMIN/CREAT UR: 5 MG/G CREAT (ref 0–29)
CREAT UR-MCNC: 59.8 MG/DL
MICROALBUMIN UR-MCNC: 3 UG/ML

## 2020-02-10 NOTE — PROGRESS NOTES
Call placed to patient. Name and  verified. Reviewed recent labs with patient.  He verbalized understanding

## 2020-03-09 ENCOUNTER — DOCUMENTATION ONLY (OUTPATIENT)
Dept: RHEUMATOLOGY | Age: 50
End: 2020-03-09

## 2020-03-09 NOTE — PROGRESS NOTES
Received fax from [facility] Patient Assistance 3/4/2020 stating patient has been approved for patient assistance for the remainder of this year for Cosentyx.

## 2020-03-31 ENCOUNTER — TELEPHONE (OUTPATIENT)
Dept: FAMILY MEDICINE CLINIC | Age: 50
End: 2020-03-31

## 2020-03-31 NOTE — TELEPHONE ENCOUNTER
Outbound call placed to patient. Name and  verified. Call placed to patient to get scheduled for DM follow up per provider. Patient scheduled for  2020 at 02:00 PM Routine Care, 15min with Dr. Carmen Boo.

## 2020-04-07 ENCOUNTER — VIRTUAL VISIT (OUTPATIENT)
Dept: RHEUMATOLOGY | Age: 50
End: 2020-04-07

## 2020-04-07 DIAGNOSIS — Z79.60 LONG-TERM USE OF IMMUNOSUPPRESSANT MEDICATION: ICD-10-CM

## 2020-04-07 DIAGNOSIS — M25.421 ELBOW JOINT EFFUSION, RIGHT: ICD-10-CM

## 2020-04-07 DIAGNOSIS — L40.50 PSORIATIC ARTHRITIS (HCC): Primary | ICD-10-CM

## 2020-04-07 DIAGNOSIS — L40.9 PSORIASIS: ICD-10-CM

## 2020-04-07 DIAGNOSIS — Z72.0 TOBACCO USE: ICD-10-CM

## 2020-04-07 RX ORDER — FOLIC ACID 1 MG/1
1 TABLET ORAL DAILY
Qty: 90 TAB | Refills: 1 | Status: SHIPPED | OUTPATIENT
Start: 2020-04-07 | End: 2020-09-16 | Stop reason: SDUPTHER

## 2020-04-07 RX ORDER — METHOTREXATE 2.5 MG/1
20 TABLET ORAL
Qty: 96 TAB | Refills: 1 | Status: SHIPPED | OUTPATIENT
Start: 2020-04-11 | End: 2020-06-24 | Stop reason: ALTCHOICE

## 2020-04-07 NOTE — PROGRESS NOTES
Chief Complaint   Patient presents with    Arthritis     1. Have you been to the ER, urgent care clinic since your last visit? Hospitalized since your last visit? No    2. Have you seen or consulted any other health care providers outside of the [address] [place] [address] since your last visit? Include any pap smears or colon screening.  No

## 2020-04-08 ENCOUNTER — TELEPHONE (OUTPATIENT)
Dept: FAMILY MEDICINE CLINIC | Age: 50
End: 2020-04-08

## 2020-04-08 NOTE — TELEPHONE ENCOUNTER
Outbound call placed to patient. name and  verified.  Call placed to reschedule patients appointment due to COVID-19 pandemic.patient scheduled for 2020 09:15 AM Virtual Visit Special Case 15

## 2020-04-20 ENCOUNTER — VIRTUAL VISIT (OUTPATIENT)
Dept: FAMILY MEDICINE CLINIC | Age: 50
End: 2020-04-20

## 2020-04-20 DIAGNOSIS — E11.9 TYPE 2 DIABETES MELLITUS WITHOUT COMPLICATION, WITHOUT LONG-TERM CURRENT USE OF INSULIN (HCC): Primary | ICD-10-CM

## 2020-04-20 DIAGNOSIS — K12.2 INFECTION OF MOUTH: ICD-10-CM

## 2020-04-20 DIAGNOSIS — E66.09 CLASS 1 OBESITY DUE TO EXCESS CALORIES WITH SERIOUS COMORBIDITY AND BODY MASS INDEX (BMI) OF 31.0 TO 31.9 IN ADULT: ICD-10-CM

## 2020-04-20 DIAGNOSIS — K21.9 GASTROESOPHAGEAL REFLUX DISEASE, ESOPHAGITIS PRESENCE NOT SPECIFIED: ICD-10-CM

## 2020-04-20 DIAGNOSIS — R22.0 SWELLING OF GUMS: ICD-10-CM

## 2020-04-20 RX ORDER — CLINDAMYCIN HYDROCHLORIDE 300 MG/1
300 CAPSULE ORAL 3 TIMES DAILY
Qty: 30 CAP | Refills: 0 | Status: SHIPPED | OUTPATIENT
Start: 2020-04-20 | End: 2020-04-30

## 2020-04-20 RX ORDER — AMOXICILLIN AND CLAVULANATE POTASSIUM 875; 125 MG/1; MG/1
1 TABLET, FILM COATED ORAL 2 TIMES DAILY
Qty: 20 TAB | Refills: 0 | Status: CANCELLED
Start: 2020-04-20 | End: 2020-04-30

## 2020-04-20 NOTE — PROGRESS NOTES
[id number] [address] Note      Subjective:     Chief Complaint   Patient presents with    Diabetes     follow up     Other     gum swelling      Dawit Rea is a 51y.o. year old male who presents for virtual evaluation of the following:       Diabetes:   Chronic for over 10 year  Key Antihyperglycemic Medications             JARDIANCE 10 mg tablet (Taking) TAKE 1 TABLET BY MOUTH EVERY DAY    metFORMIN ER (GLUCOPHAGE XR) 500 mg tablet (Taking) Take 1 Tab by mouth daily (with dinner). glimepiride (AMARYL) 2 mg tablet (Taking) Take 4 mg by mouth every morning. SITagliptin (JANUVIA) 100 mg tablet (Taking) Take 100 mg by mouth daily. Previous Tx: Farxiga, Insulin  Home monitoring 120-240  Lab Results   Component Value Date/Time    Hemoglobin A1c 7.0 (H) 05/16/2017 10:04 AM    Hemoglobin A1c (POC) 7.7 11/26/2019 04:00 PM       Gum swelling and pain  Left upper mouth  Has false teeth- upper bridge.    State she was told his bridge is ill fitting and needed to be adjusted  Denies fever, discharge, bleeding    GERD:   Chronic > 1 year  Previous Sx: back bash, burning  Trigger green tea , after eating, cooking, smoking  Tx: Prilosec  - feels this improves symptoms  Obese  Prescribed by previous PCP      Obesity  \"Does not bother me\"  Diet: unrestricted  Activity: None  Last Weight Metrics:  Weight Loss Metrics 2/4/2020 1/3/2020 11/26/2019 8/29/2019 6/28/2019 5/29/2019 4/29/2019   Today's Wt 215 lb 207 lb 214 lb 6.4 oz 210 lb 217 lb 216 lb 175 lb   BMI 31.75 kg/m2 30.57 kg/m2 31.66 kg/m2 31.01 kg/m2 32.05 kg/m2 31.9 kg/m2 25.84 kg/m2         Social:   Employment- works as [employer]   Lives wife and 3 children.        Patient Care Team:  Sole Alexander MD as PCP - General (Family Practice)  Sole Alexander MD as PCP - [facility] Empaneled Provider  Tran Isaacs MD as Physician (Rheumatology)  Jyoti Chávez MD as Physician (Dermatology)  Jose Garcia MD as Physician (Orthopedic Surgery)   Dentist- [facility]  Optho- [facility]       Review of Systems   Pertinent positives and negative per HPI. All other systems  reviewed are negative for a Comprehensive ROS (10+).        Past Medical History:   Diagnosis Date    Arthritis     Diabetes ([facility] [address] 5/27/2009    Elevated liver enzymes     resolved     Hypercholesterolemia     Psoriasis 5/27/2009    Psoriatic arthritis ([facility] 5/27/2009        Social History     Socioeconomic History    Marital status:      Spouse name: Not on file    Number of children: Not on file    Years of education: Not on file    Highest education level: Not on file   Occupational History    Not on file   Social Needs    Financial resource strain: Not on file    Food insecurity     Worry: Not on file     Inability: Not on file    Transportation needs     Medical: Not on file     Non-medical: Not on file   Tobacco Use    Smoking status: Current Every Day Smoker     Packs/day: 1.00     Years: 10.00     Pack years: 10.00     Types: Cigarettes    Smokeless tobacco: Never Used   Substance and Sexual Activity    Alcohol use: No    Drug use: No    Sexual activity: Yes     Partners: Female   Lifestyle    Physical activity     Days per week: Not on file     Minutes per session: Not on file    Stress: Not on file   Relationships    Social connections     Talks on phone: Not on file     Gets together: Not on file     Attends [employer] service: Not on file     Active member of club or organization: Not on file     Attends meetings of clubs or organizations: Not on file     Relationship status: Not on file    Intimate partner violence     Fear of current or ex partner: Not on file     Emotionally abused: Not on file     Physically abused: Not on file     Forced sexual activity: Not on file   Other Topics Concern    Not on file   Social History Narrative    Not on file       Family History   Problem Relation Age of Onset    Diabetes Paternal Grandmother     No Known Problems Mother     Diabetes Father     Asthma Sister     No Known Problems Brother        Current Outpatient Medications   Medication Sig    methotrexate (RHEUMATREX) 2.5 mg tablet Take 8 Tabs by mouth Every Saturday.  Omeprazole delayed release (PRILOSEC D/R) 20 mg tablet Take 1 Tab by mouth daily.  acetaminophen (TYLENOL) 500 mg tablet Take 1-2 Tabs by mouth three (3) times daily as needed for Pain.  secukinumab (COSENTYX PEN, 2 PENS,) 150 mg/mL pnij 300 mg by SubCUTAneous route every four (4) weeks. Indications: psoriasis associated with arthritis    Blood-Glucose Meter monitoring kit Use to check blood sugar daily. Dx E11.9. Fill per insurance formulary preference.  glucose blood VI test strips (BLOOD GLUCOSE TEST) strip Use to check blood sugar daily. Dx E11.9. Pharmacist to choose based on insurance/glucose monitoring kit type    lancets misc Use to check blood sugar daily. Dx E11.9. Fill per insurance formulary preference.  atorvastatin (LIPITOR) 20 mg tablet Take 1 Tab by mouth daily.  amitriptyline (ELAVIL) 50 mg tablet TAKE 1 TABLET BY MOUTH EVERYDAY AT BEDTIME    celecoxib (CELEBREX) 200 mg capsule TAKE 1 CAPSULE BY MOUTH TWICE A DAY AS NEEDED FOR PAIN WITH FOOD    JARDIANCE 10 mg tablet TAKE 1 TABLET BY MOUTH EVERY DAY    metFORMIN ER (GLUCOPHAGE XR) 500 mg tablet Take 1 Tab by mouth daily (with dinner).  glimepiride (AMARYL) 2 mg tablet Take 4 mg by mouth every morning.  SITagliptin (JANUVIA) 100 mg tablet Take 100 mg by mouth daily.  glucose blood VI test strips (ASCENSIA AUTODISC VI, ONE TOUCH ULTRA TEST VI) strip Twice a day    folic acid (FOLVITE) 1 mg tablet Take 1 Tab by mouth daily.  nicotine (NICODERM CQ) 21 mg/24 hr APPLY 1 PATCH EVERY DAY AS DIRECTED     No current facility-administered medications for this visit. Objective: There were no vitals filed for this visit.     Vitals measurement not available     Physical Examination:  General: Alert, cooperative, no distress, appears stated age. Obese  Eyes: Conjunctivae clear. Pupils equally round. Extraocular muscles intact. Ears: Normal appearing external ear   Nose: Nares normal appearing  Mouth/Throat: Lips, mucosa, and tongue normal. Left upper gum swelling and mild erythema. No tonsillar enlargement noted. Neck: Supple, symmetrical, trachea midline, no neck mass visualized. Respiratory: Breathing comfortably, in no acute respiratory distress. Cardiovascular: Visualized extremities without edema. MSK: Upper extremities normal appearing. Skin: No significant erythematous lesions or discoloration noted on facial skin. No rashes or lesions on exposed skin. Neurologic: No facial asymmetry. Normal gaze. Cranial nerves intact. Psychiatric: Affect appropriate. Mood euthymic. Thoughts logical. Speech volume and speed normal. No hallucinations. Well kempt. Office Visit on 02/04/2020   Component Date Value Ref Range Status    Creatinine, urine 02/05/2020 59.8  Not Estab. mg/dL Final    Microalbumin, urine 02/05/2020 3.0  Not Estab. ug/mL Final    Microalb/Creat ratio (ug/mg creat.) 02/05/2020 5  0 - 29 mg/g creat Final    Comment:                        Normal:                0 -  29                         Moderately increased: 30 - 300                         Severely increased:       >300                **Please note reference interval change**           Assessment/ Plan:   Diagnoses and all orders for this visit:    1. Type 2 diabetes mellitus without complication, without long-term current use of insulin (HCC)  -     HEMOGLOBIN A1C WITH EAG    2. Swelling of gums  -     clindamycin (CLEOCIN) 300 mg capsule; Take 1 Cap by mouth three (3) times daily for 10 days. 3. Class 1 obesity due to excess calories with serious comorbidity and body mass index (BMI) of 31.0 to 31.9 in adult    4.  Infection of mouth  -     clindamycin (CLEOCIN) 300 mg capsule; Take 1 Cap by mouth three (3) times daily for 10 days. 5. Gastroesophageal reflux disease, esophagitis presence not specified        PMH reviewed per orders. Labs to eval end organ function and etiology of chronic/acute concerns. Diabetes chronic, asymptomatic. Continue current oral medication regimen. Return to clinic for lab visit. GERD, uncontrolled. Continue Prilosec. Follow up with GI per routine. Gum swelling may represent infection. Add oral antibiotic. Follow up with dentist when able after [address] pandemic. Diet and lifestyle modification encouraged for weight loss and chronic disease prevention/ management. Follow up with specialists per routine- optho, dentist, ortho. We discussed the expected course, resolution and complications of the diagnosis(es) in detail. Medication risks, benefits, costs, interactions, and alternatives were discussed as indicated. I advised him to contact the office if his condition worsens, changes or fails to improve as anticipated. He expressed understanding with the diagnosis(es) and plan. Josefina Irwin is a 51 y.o. male being evaluated by a video visit encounter for concerns as above. A caregiver was present when appropriate. Due to this being a TeleHealth encounter (During OSITQ-94 public health emergency), evaluation of the following organ systems was limited: Vitals/Constitutional/EENT/Resp/CV/GI//MS/Neuro/Skin/Heme-Lymph-Imm. Pursuant to the emergency declaration under the [facility] waiver authority and the [employer] Act, this Virtual  Visit was conducted, with patient's (and/or legal guardian's) consent, to reduce the patient's risk of exposure to COVID-19 and provide necessary medical care. Services were provided through a video synchronous discussion virtually to substitute for in-person clinic visit.      Provider was in the office while conducting this encounter. Patient was at home during encounter. Other persons participating in call: None  Consent:  He and/or his healthcare decision maker is aware that this patient-initiated Telehealth encounter is a billable service, with coverage as determined by his insurance carrier. He is aware that he may receive a bill and has provided verbal consent to proceed: Yes  This virtual visit was conducted via [employer]. Pursuant to the emergency declaration under the [facility] waiver authority and the [employer] and [employer] Act, this Virtual  Visit was conducted to reduce the patient's risk of exposure to COVID-19 and provide continuity of care for an established patient. Services were provided through a video synchronous discussion virtually to substitute for in-person clinic visit. Due to this being a TeleHealth evaluation, many elements of the physical examination are unable to be assessed. Total Time: minutes: 21-30 minutes. Educated patient on red flag symptoms to warrant return to clinic or emergency room visit. I have discussed the diagnosis with the patient and the intended plan as seen in the above orders. The patient has been offered or received an after-visit summary and questions were answered concerning future plans. I have discussed medication side effects and warnings with the patient as well. Follow-up and Dispositions    · Return in about 3 months (around 7/20/2020) for Follow Up diabetes and weight.        Signed,    Gurinder Coronado MD  4/20/2020

## 2020-04-20 NOTE — PROGRESS NOTES
Chief Complaint   Patient presents with    Diabetes     follow up     Other     gum swelling      1. Have you been to the ER, urgent care clinic since your last visit? Hospitalized since your last visit? No    2. Have you seen or consulted any other health care providers outside of the [address] [place] [address] since your last visit? Include any pap smears or colon screening. No       Patient presents for VV for DM follow up. Also c/o gum swelling to left side. Requesting abt. Patient denies sick contact.    Denies recent travel

## 2020-04-20 NOTE — PATIENT INSTRUCTIONS
Weight Loss Tips: 
Remember this is like a part time job so your motivation and commitment is key to your success. Mindset Weight loss like any other behavior change starts in your mind. Think hard about what your motivates you to lose weight then meditate on that. Remind yourself of your motivation 
with phone alarms, scheduled meditation time, vision board, journal- just to name a few ideas. Have realistic goals. We expect with diligent healthy diet and physical activity you can lose 5% of your body weight in 3 
months. Wt in lbs x 0.05 = #lbs you should lose in 3 months. Make food and activity changes with a goal of CONSISTENCY not perfection. Food Start eating differently. Most of your weight loss and gain is from what you eat. Use small plates only Drink 2 liters (1/2 gallon) of water every day HALF of every meal should be fruit or vegetables Try meal prepping on Sunday (or your day off) with new different vegetables. Consider meal prep service such as [employer], wepremeals. com Replace soda with diet soda or other zero sugar drinks (selter water just fine) Consider using the [employer] dereck for calorie counting. Goal [id number] calories per day Activity Staying physically active will help you lose more weight and can help you get over the plateau when you weight just 
won't change any more with diet. Start exercise at least 5 days per week for 40 minutes. Consider [employer] training dereck for home exercises. You can start with walking. I suggest walking at a speed of at least 3.5-4.5mph to for the weight loss benefit. Increase your speed or distance every 2 weeks. Do some slow stretching daily of legs, arms and back. Consider adding weight training with light weights at home or at the gym. See a doctor or a physical training for 
instructions in order to avoid injuries from doing muscle training incorrectly.  
Free fitness program in [place]: [employer] [email or web address] 
 
 
  

## 2020-06-06 DIAGNOSIS — Z91.89 10 YEAR RISK OF MI OR STROKE 7.5% OR GREATER: ICD-10-CM

## 2020-06-06 DIAGNOSIS — E78.5 HYPERLIPIDEMIA, UNSPECIFIED HYPERLIPIDEMIA TYPE: ICD-10-CM

## 2020-06-06 DIAGNOSIS — E11.9 TYPE 2 DIABETES MELLITUS WITHOUT COMPLICATION, WITHOUT LONG-TERM CURRENT USE OF INSULIN (HCC): ICD-10-CM

## 2020-06-10 RX ORDER — METFORMIN HYDROCHLORIDE 500 MG/1
500 TABLET, EXTENDED RELEASE ORAL
Qty: 90 TAB | Refills: 1 | Status: SHIPPED | OUTPATIENT
Start: 2020-06-10 | End: 2021-01-23

## 2020-06-10 RX ORDER — ATORVASTATIN CALCIUM 20 MG/1
TABLET, FILM COATED ORAL
Qty: 90 TAB | Refills: 1 | Status: SHIPPED | OUTPATIENT
Start: 2020-06-10 | End: 2021-04-01

## 2020-06-12 DIAGNOSIS — M26.629 TMJ SYNDROME: ICD-10-CM

## 2020-06-13 RX ORDER — ACETAMINOPHEN 500 MG
TABLET ORAL
Qty: 90 TAB | Refills: 0 | Status: SHIPPED | OUTPATIENT
Start: 2020-06-13

## 2020-06-24 ENCOUNTER — OFFICE VISIT (OUTPATIENT)
Dept: RHEUMATOLOGY | Age: 50
End: 2020-06-24

## 2020-06-24 VITALS
HEART RATE: 78 BPM | HEIGHT: 69 IN | SYSTOLIC BLOOD PRESSURE: 113 MMHG | DIASTOLIC BLOOD PRESSURE: 77 MMHG | RESPIRATION RATE: 18 BRPM | BODY MASS INDEX: 32.58 KG/M2 | WEIGHT: 220 LBS | TEMPERATURE: 97.2 F

## 2020-06-24 DIAGNOSIS — Z72.0 TOBACCO USE: ICD-10-CM

## 2020-06-24 DIAGNOSIS — L40.9 PSORIASIS: ICD-10-CM

## 2020-06-24 DIAGNOSIS — M25.421 ELBOW JOINT EFFUSION, RIGHT: ICD-10-CM

## 2020-06-24 DIAGNOSIS — L40.50 PSORIATIC ARTHRITIS (HCC): Primary | ICD-10-CM

## 2020-06-24 DIAGNOSIS — Z79.60 LONG-TERM USE OF IMMUNOSUPPRESSANT MEDICATION: ICD-10-CM

## 2020-06-24 RX ORDER — SYRINGE-NEEDLE,INSULIN,0.5 ML 30 GX5/16"
1 SYRINGE, EMPTY DISPOSABLE MISCELLANEOUS
Qty: 12 SYRINGE | Refills: 3 | Status: SHIPPED | OUTPATIENT
Start: 2020-06-27 | End: 2021-07-08

## 2020-06-24 RX ORDER — METHOTREXATE 25 MG/ML
25 INJECTION, SOLUTION INTRA-ARTERIAL; INTRAMUSCULAR; INTRAVENOUS
Qty: 12 ML | Refills: 1 | Status: SHIPPED | OUTPATIENT
Start: 2020-06-24 | End: 2020-09-16 | Stop reason: SDUPTHER

## 2020-06-24 NOTE — PROGRESS NOTES
REASON FOR VISIT    This is a follow-up visit for Mr. Susanne Mayberry for     ICD-10-CM   1. Psoriatic arthritis ([facility] L40.50   2. Psoriasis L40.9     Spondyloarthritis phenotype includes:  Anti-CCP positive: no  Rheumatoid factor positive: no  HLA-B27 positive: no  Inflammatory Back Pain: no  Erosive disease: no  Sacroiliitis: no  Ankylosis: no  Psoriasis: yes  Enthesitis: no  Dactylitis: no  Nail Pitting: no  Onycholysis: no  Splinter Hemorrhage: no  Extra-articular manifestations include: none  SAPHO: no    Immunosuppression Screening (5/29/2019):   Quantiferon TB: negative  PPD:  Not performed  Hepatitis B: negative  Hepatitis C: negative    Therapy History includes:  Current NSAIDs include: Celebrex 100 mg twice daily  Current DMARD include: methotrexate 20 mg every Saturday (8/13/2014 to 5/27/2015; insurance coverage, 6/28/2019 to 7/28/2019; did not refill, 8/29/2019 to present), Cosentyx 300 mg every 30 days (6/2018 to 9/2019; loss of patient assistance, 1/03/2020 to present)  Prior DMARD therapy includes: sulfasalazine 1000 mg twice daily (6/27/2012 to 8/13/2014; 12/02/2014 to 500 mg twice daily;10/02/2014 to 12/02/2014), Enbrel, Humira, Remicade, Otezla 30 mg twice daily (5/2015 to 6/2018), Stelara  The following DMARDs have been ineffective: sulfasalazine, Otezla, Enbrel, Humira, Remicade, Stelara   The following DMARDs were stopped because of side effects: none    Active problems include:    Patient Active Problem List   Diagnosis Code    Diabetes ([facility] E11.9    Psoriasis L40.9    [facility] L40.50    Hyperlipidemia E78.5    Long-term use of immunosuppressant medication Z79.899    PPD positive R76.11    Tobacco use Z72.0    Long term current use of non-steroidal anti-inflammatories (NSAID) Z79.1    Primary osteoarthritis of both knees M17.0    Class 1 obesity due to excess calories with serious comorbidity and body mass index (BMI) of 31.0 to 31.9 in adult E66.09, Z68.31    Gastroesophageal reflux disease K21.9     HISTORY OF PRESENT ILLNESS    Mr. Palmira Moritz returns for a follow-up visit. On his last visit, I increased his from methotrexate 15 mg to 20 mg every Saturday and continued Cosentyx 300 mg every 30 days, which he has taken good tolerance. Today, he reports feeling no difference with his methotrexate dose increase. He continues to complain of pain in his right elbow. She complains of pain in his DIPs and feels they are swollen and becoming crooked. His pain may change in intensity and may be severe. At times, he increase His psoriasis is active on the right distal leg. He has decreased his smoking for 1 PPD to one to cigarettes per day. He denies fever, weight loss, blurred vision, vision loss, oral ulcers, ankle swelling, dry cough, dyspnea, nausea, vomiting, dysphagia, abdominal pain, black or bloody stool, fall since last visit, rash, easy bruising and increased thirst.    Most recent toxicity labs on 11/26/2019 revealed no abnormalities. The patient has not had any interval hospital admissions, infections, or surgeries. REVIEW OF SYSTEMS    A comprehensive review of systems was performed and pertinent results are documented in the HPI, review of systems is otherwise non-contributory. PAST MEDICAL HISTORY    He has a past medical history of Arthritis, Diabetes ([facility] (5/27/2009), Elevated liver enzymes, Hypercholesterolemia, Psoriasis (5/27/2009), and [facility] (5/27/2009). FAMILY HISTORY    His family history includes Asthma in his sister; Diabetes in his father and paternal grandmother; No Known Problems in his brother and mother. SOCIAL HISTORY    He reports that he has been smoking cigarettes. He has a 10.00 pack-year smoking history. He has never used smokeless tobacco. He reports that he does not drink alcohol or use drugs.     IMMUNIZATIONS  Immunization History   Administered Date(s) Administered    H1N1 Influenza Virus Vaccine 10/27/2009    Influenza Vaccine Split 10/10/2012, 10/19/2012    Pneumococcal Polysaccharide (PPSV-23) 11/11/2015       MEDICATIONS    Current Outpatient Medications   Medication Sig Dispense Refill    methotrexate 25 mg/mL chemo injection 1 mL by SubCUTAneous route every seven (7) days. 12 mL 1    [START ON 6/27/2020] Insulin Syringe-Needle U-100 1 mL 30 gauge x 5/16 syrg 1 Each by Does Not Apply route Every Saturday for 12 doses. To be used for methotrexate solution 12 Syringe 3    acetaminophen (TYLENOL) 500 mg tablet TAKE 1 TO 2 TABLETS BY MOUTH 3 TIMES A DAY AS NEEDED FOR PAIN 90 Tab 0    metFORMIN ER (GLUCOPHAGE XR) 500 mg tablet TAKE 1 TAB BY MOUTH DAILY (WITH DINNER). 90 Tab 1    atorvastatin (LIPITOR) 20 mg tablet TAKE 1 TABLET BY MOUTH EVERY DAY 90 Tab 1    folic acid (FOLVITE) 1 mg tablet Take 1 Tab by mouth daily. 90 Tab 1    Omeprazole delayed release (PRILOSEC D/R) 20 mg tablet Take 1 Tab by mouth daily. 30 Tab 2    secukinumab (COSENTYX PEN, 2 PENS,) 150 mg/mL pnij 300 mg by SubCUTAneous route every four (4) weeks. Indications: psoriasis associated with arthritis 4 Syringe 0    Blood-Glucose Meter monitoring kit Use to check blood sugar daily. Dx E11.9. Fill per insurance formulary preference. 1 Kit 0    glucose blood VI test strips (BLOOD GLUCOSE TEST) strip Use to check blood sugar daily. Dx E11.9. Pharmacist to choose based on insurance/glucose monitoring kit type 100 Strip 5    lancets misc Use to check blood sugar daily. Dx E11.9. Fill per insurance formulary preference.  100 Each 11    amitriptyline (ELAVIL) 50 mg tablet TAKE 1 TABLET BY MOUTH EVERYDAY AT BEDTIME  3    celecoxib (CELEBREX) 200 mg capsule TAKE 1 CAPSULE BY MOUTH TWICE A DAY AS NEEDED FOR PAIN WITH FOOD  1    JARDIANCE 10 mg tablet TAKE 1 TABLET BY MOUTH EVERY DAY  5    nicotine (NICODERM CQ) 21 mg/24 hr APPLY 1 PATCH EVERY DAY AS DIRECTED  2    glimepiride (AMARYL) 2 mg tablet Take 4 mg by mouth every morning.  SITagliptin (JANUVIA) 100 mg tablet Take 100 mg by mouth daily.  glucose blood VI test strips (ASCENSIA AUTODISC VI, ONE TOUCH ULTRA TEST VI) strip Twice a day 1 Package 12        ALLERGIES    Allergies   Allergen Reactions    Iodinated Contrast Media Shortness of Breath       PHYSICAL EXAMINATION    Visit Vitals  /77   Pulse 78   Temp 97.2 °F (36.2 °C)   Resp 18   Ht 5' 9\" (1.753 m)   Wt 220 lb (99.8 kg)   BMI 32.49 kg/m²     Body mass index is 32.49 kg/m². General: Patient is alert, oriented x 3, not in acute distress    HEENT:   Sclerae are not injected and appear moist.  There is no alopecia. Chest:  Breathing comfortably at room air    Skin:    Psoriasis:     Yes (large patch lateral posterior right Achilles)  Nail Pitting:     no  Onycholysis:     Yes (LEFT: 1st, 3rd, 5th, RIGHT: 4th)  Splinter Hemorrhage:   Yes (LEFT: 5th)  Palmoplantar pustulosis:   no  Acne fulminans:    no  Acne conglobata:    no  Hidradenitis Suppurativa:   no  Dissecting cellulitis of the scalp:  no  Pilonidal sinus:    no  Erythema nodosum:    no    Musculoskeletal:  A comprehensive musculoskeletal exam was performed for all joints of each upper and lower extremity and assessed for swelling, tenderness and range of motion. Right elbow flexion deformity  Bilateral knee crepitus with some decrease ROM of the right knee     Costochondritis:  no   Synovitis:   yes (see table. Bilateral MTPs.  DIP LEFT: 2nd, 3rd, 4th, 5th; RIGHT: 3rd)  Dactylitis:   no  Enthesitis:   no    Joint Count 6/24/2020 1/3/2020 5/29/2019   Left wrist- Tender 1 1 -   Left wrist- Swollen 0 1 -   Left 5th MCP - Tender 1 - -   Left 5th MCP - Swollen 0 - -   Left thumb IP - Tender 1 - 1   Left thumb IP - Swollen 1 - 0   Left 2nd PIP - Tender - - 1   Left 2nd PIP - Swollen - - 0   Left 4th PIP - Tender - - 1   Left 4th PIP - Swollen - - 0   Left 5th PIP - Tender - - 1   Left 5th PIP - Swollen - - 0   Right elbow - Tender 1 1 1 Right elbow - Swollen 1 1 1   Right thumb IP - Tender 1 - -   Right thumb IP - Swollen 0 - -   Right 3rd PIP - Tender 1 - -   Right 3rd PIP - Swollen 0 - -   Tender Joint Count (Total) 6 2 5   Swollen Joint Count (Total) 2 2 1     DATA REVIEW    Laboratory     Recent laboratory results were reviewed, summarized, and discussed with the patient. Imaging    Musculoskeletal Ultrasound    None    Radiographs    Bilateral Hand 5/29/2019: LEFT: No fracture or dislocation on plain film. No joint space narrowing. No joint space erosion or periosteal reaction. Alignment is within normal limits. Bone mineralization is decreased. No soft tissue calcification. RIGHT: No fracture or dislocation on plain film. No joint space narrowing. There is no joint space erosion. There is subtle periostitis distal phalanx of the middle finger. Alignment is within normal limits. Bone mineralization is decreased. No soft tissue calcification. Chest 3/30/2019: Cardiac monitoring wires overlie the thorax. The cardiomediastinal and hilar contours are within normal limits. The pulmonary vasculature is within normal limits. The lungs and pleural spaces are clear. The visualized bones and upper abdomen are age-appropriate. Right Elbow 12/15/2014: a moderate right elbow effusion.  A nondisplaced fracture of the radial head is suspected. No dislocation is shown. CT Imaging    CT Head without contrast 9/10/2010: normal ventricles and basal cisterns. No intracranial masses or hemorrhages are seen. No focal intraparenchymal low density abnormalities are seen. MR Imaging    MRI Right Elbow without contrast 12/22/2014: there is a large complex appearing elbow joint effusion. There is  diffuse chondral thinning. Mild diffuse osseous edema is shown. The elbow collateral ligaments are intact. No tendon derangement is demonstrated. No soft tissue mass is shown.     DXA     None    ASSESSMENT AND PLAN    This is a follow-up visit for Mr. Lilia. 1) Psoriatic Arthritis. (psoriasis, arthritis) He is maintained on methotrexate 20 mg every Saturday and Cosentyx 300 mg monthly, with good tolerance and feels about the same. He continues to have active arthritis and psoriasis. He continues to have active disease in his right elbow, left hand and ankle. I will change oral methotrexate 20 mg every Saturday to 25 mg SubQ every Saturday. I will continue Cosentyx. Labs to be drawn today. 2) Long Term Use of Immunosuppressants. The patient remains on immunomodulatory medications (methotrexate, Cosentyx) and requires frequent toxicity monitoring by blood work. 3) Bilateral Knee Osteoarthritis. I had referred him to physical therapy. This was not an active issue today. 4) Long Term Use of NSAIDs. The patient remains on Celebrex. His most recent renal function was normal. I will repeat. 5) Tobacco Use. He is down to smoking 1 to 2 cigarettes a day from 1 PPD. The patient voiced understanding of the aforementioned assessment and plan.      TODAY'S ORDERS    Orders Placed This Encounter    methotrexate 25 mg/mL chemo injection    Insulin Syringe-Needle U-100 1 mL 30 gauge x 5/16 syrg     Future Appointments   Date Time Provider Department Center   9/16/2020  2:00 PM Tasha Frankel MD [facility] [facility]   10/7/2020 10:40 AM Karlo Bermudez MD [place], [place], [address] [facility]    Adult Rheumatology   Rheumatology Ultrasound Certified  [address]  [address] [facility], [address]   Phone [phone]  Fax [phone]

## 2020-06-24 NOTE — LETTER
6/24/20 Patient: Kitty Rowley YOB: 1970 Date of Visit: 6/24/2020 Jeanne Santos MD 
[address] [facility] [address] VIA In Basket Dear Jeanne Santos MD, Thank you for referring Mr. Marsha Frye to [address] [place] [address] for evaluation. My notes for this consultation are attached. If you have questions, please do not hesitate to call me. I look forward to following your patient along with you.  
 
 
Sincerely, 
 
Merlene Marquez MD

## 2020-06-24 NOTE — PATIENT INSTRUCTIONS
I will replace oral methotrexate for subcutaneous injections of methotrexate     Please draw up 1 mL of methotrexate in the syringe. Then, place an ice cube or ice pack on your injection site (thigh or abdomen) for about one to two minutes to numb your skin - if you are concerned for the pinch sensation from the small needle    Then pinch your skin to lift it up (tent it) and insert the needle under the skin and inject the methotrexate.     Continue daily folic acid    Stop methotrexate tablets

## 2020-07-07 LAB
ALBUMIN SERPL-MCNC: 4.9 G/DL (ref 4–5)
ALBUMIN/GLOB SERPL: 2 {RATIO} (ref 1.2–2.2)
ALP SERPL-CCNC: 107 IU/L (ref 39–117)
ALT SERPL-CCNC: 36 IU/L (ref 0–44)
AST SERPL-CCNC: 22 IU/L (ref 0–40)
BASOPHILS # BLD AUTO: 0 X10E3/UL (ref 0–0.2)
BASOPHILS NFR BLD AUTO: 0 %
BILIRUB SERPL-MCNC: 0.5 MG/DL (ref 0–1.2)
BUN SERPL-MCNC: 17 MG/DL (ref 6–24)
BUN/CREAT SERPL: 15 (ref 9–20)
CALCIUM SERPL-MCNC: 9.8 MG/DL (ref 8.7–10.2)
CHLORIDE SERPL-SCNC: 105 MMOL/L (ref 96–106)
CO2 SERPL-SCNC: 24 MMOL/L (ref 20–29)
COMMENT, 144067: NORMAL
CREAT SERPL-MCNC: 1.14 MG/DL (ref 0.76–1.27)
EOSINOPHIL # BLD AUTO: 0 X10E3/UL (ref 0–0.4)
EOSINOPHIL NFR BLD AUTO: 1 %
ERYTHROCYTE [DISTWIDTH] IN BLOOD BY AUTOMATED COUNT: 13 % (ref 11.6–15.4)
GAMMA INTERFERON BACKGROUND BLD IA-ACNC: 0.06 IU/ML
GLOBULIN SER CALC-MCNC: 2.4 G/DL (ref 1.5–4.5)
GLUCOSE SERPL-MCNC: 170 MG/DL (ref 65–99)
HBV CORE AB SERPL QL IA: NEGATIVE
HBV CORE IGM SERPL QL IA: NEGATIVE
HBV E AB SERPL QL IA: NEGATIVE
HBV E AG SERPL QL IA: NEGATIVE
HBV SURFACE AB SER QL: NON REACTIVE
HBV SURFACE AG SERPL QL IA: NEGATIVE
HCT VFR BLD AUTO: 45.3 % (ref 37.5–51)
HCV AB S/CO SERPL IA: <0.1 S/CO RATIO (ref 0–0.9)
HGB BLD-MCNC: 15.3 G/DL (ref 13–17.7)
IMM GRANULOCYTES # BLD AUTO: 0 X10E3/UL (ref 0–0.1)
IMM GRANULOCYTES NFR BLD AUTO: 0 %
LYMPHOCYTES # BLD AUTO: 1.9 X10E3/UL (ref 0.7–3.1)
LYMPHOCYTES NFR BLD AUTO: 27 %
M TB IFN-G BLD-IMP: NEGATIVE
M TB IFN-G CD4+ BCKGRND COR BLD-ACNC: 0.06 IU/ML
MCH RBC QN AUTO: 28.4 PG (ref 26.6–33)
MCHC RBC AUTO-ENTMCNC: 33.8 G/DL (ref 31.5–35.7)
MCV RBC AUTO: 84 FL (ref 79–97)
METHOTREXATE PG1: 50.86 NMOL/L RBC
METHOTREXATE PG2: 28.23 NMOL/L RBC
METHOTREXATE PG3: 17.61 NMOL/L RBC
METHOTREXATE PG4: 2.02 NMOL/L RBC
METHOTREXATE PG5: 0.84 NMOL/L RBC
METHOTREXATE-PG TOTAL: 99.57 NMOL/L RBC
MITOGEN IGNF BLD-ACNC: >10 IU/ML
MONOCYTES # BLD AUTO: 0.5 X10E3/UL (ref 0.1–0.9)
MONOCYTES NFR BLD AUTO: 7 %
MTXPGSRA INTERPRETATION: NORMAL
NEUTROPHILS # BLD AUTO: 4.6 X10E3/UL (ref 1.4–7)
NEUTROPHILS NFR BLD AUTO: 65 %
PLATELET # BLD AUTO: 253 X10E3/UL (ref 150–450)
POTASSIUM SERPL-SCNC: 4.3 MMOL/L (ref 3.5–5.2)
PROT SERPL-MCNC: 7.3 G/DL (ref 6–8.5)
QUANTIFERON INCUBATION, QF1T: NORMAL
QUANTIFERON TB2 AG: 0.04 IU/ML
RBC # BLD AUTO: 5.38 X10E6/UL (ref 4.14–5.8)
SERVICE CMNT-IMP: NORMAL
SODIUM SERPL-SCNC: 142 MMOL/L (ref 134–144)
WBC # BLD AUTO: 7.1 X10E3/UL (ref 3.4–10.8)

## 2020-07-07 NOTE — PROGRESS NOTES
The results were reviewed. methotrexate level 99.57, but pg1 50.86 suggestive of recent dose taking. All remaining labs are normal/negative.

## 2020-09-16 ENCOUNTER — OFFICE VISIT (OUTPATIENT)
Dept: RHEUMATOLOGY | Age: 50
End: 2020-09-16
Payer: MEDICAID

## 2020-09-16 VITALS
HEART RATE: 83 BPM | SYSTOLIC BLOOD PRESSURE: 101 MMHG | BODY MASS INDEX: 31.16 KG/M2 | DIASTOLIC BLOOD PRESSURE: 67 MMHG | TEMPERATURE: 97.7 F | WEIGHT: 211 LBS | RESPIRATION RATE: 18 BRPM

## 2020-09-16 DIAGNOSIS — L40.9 PSORIASIS: ICD-10-CM

## 2020-09-16 DIAGNOSIS — Z79.60 LONG-TERM USE OF IMMUNOSUPPRESSANT MEDICATION: ICD-10-CM

## 2020-09-16 DIAGNOSIS — Z79.1 LONG TERM CURRENT USE OF NON-STEROIDAL ANTI-INFLAMMATORIES (NSAID): ICD-10-CM

## 2020-09-16 DIAGNOSIS — M25.421 ELBOW JOINT EFFUSION, RIGHT: ICD-10-CM

## 2020-09-16 DIAGNOSIS — Z72.0 TOBACCO USE: ICD-10-CM

## 2020-09-16 DIAGNOSIS — L40.50 PSORIATIC ARTHRITIS (HCC): Primary | ICD-10-CM

## 2020-09-16 PROCEDURE — 99215 OFFICE O/P EST HI 40 MIN: CPT | Performed by: INTERNAL MEDICINE

## 2020-09-16 RX ORDER — IXEKIZUMAB 80 MG/ML
80 INJECTION, SOLUTION SUBCUTANEOUS
Qty: 6 SYRINGE | Refills: 0 | Status: SHIPPED | OUTPATIENT
Start: 2020-09-16 | End: 2021-01-14 | Stop reason: SDUPTHER

## 2020-09-16 RX ORDER — CELECOXIB 200 MG/1
CAPSULE ORAL
Qty: 180 CAP | Refills: 1 | Status: SHIPPED | OUTPATIENT
Start: 2020-09-16 | End: 2020-11-13 | Stop reason: SDUPTHER

## 2020-09-16 RX ORDER — FOLIC ACID 1 MG/1
1 TABLET ORAL DAILY
Qty: 90 TAB | Refills: 1 | Status: SHIPPED | OUTPATIENT
Start: 2020-09-16 | End: 2020-12-17 | Stop reason: SDUPTHER

## 2020-09-16 RX ORDER — IXEKIZUMAB 80 MG/ML
80 INJECTION, SOLUTION SUBCUTANEOUS
Qty: 1 SYRINGE | Refills: 11 | Status: SHIPPED | OUTPATIENT
Start: 2020-09-16 | End: 2021-10-14 | Stop reason: SDUPTHER

## 2020-09-16 RX ORDER — IXEKIZUMAB 80 MG/ML
INJECTION, SOLUTION SUBCUTANEOUS
Qty: 2 SYRINGE | Refills: 0 | Status: SHIPPED | OUTPATIENT
Start: 2020-09-16 | End: 2021-03-31 | Stop reason: SDUPTHER

## 2020-09-16 RX ORDER — METHOTREXATE 25 MG/ML
25 INJECTION, SOLUTION INTRA-ARTERIAL; INTRAMUSCULAR; INTRAVENOUS
Qty: 12 VIAL | Refills: 1
Start: 2020-09-18 | End: 2020-12-17 | Stop reason: SDUPTHER

## 2020-09-16 NOTE — LETTER
9/16/20 Patient: America Cheng YOB: 1970 Date of Visit: 9/16/2020 Vnanesa Bennett MD 
[address] [facility] [address] VIA In Basket Dear Vannesa Bennett MD, Thank you for referring Mr. Albina Schwartz to [address] [place] [address] for evaluation. My notes for this consultation are attached. If you have questions, please do not hesitate to call me. I look forward to following your patient along with you.  
 
 
Sincerely, 
 
Yani Steele MD

## 2020-09-16 NOTE — PROGRESS NOTES
REASON FOR VISIT    This is a follow-up visit for Mr. Sol Edge for     ICD-10-CM   1. Psoriatic arthritis ([facility] L40.50   2.  Psoriasis L40.9     Spondyloarthritis phenotype includes:  Anti-CCP positive: no  Rheumatoid factor positive: no  HLA-B27 positive: no  Inflammatory Back Pain: no  Erosive disease: no  Sacroiliitis: no  Ankylosis: no  Psoriasis: yes  Enthesitis: no  Dactylitis: no  Nail Pitting: no  Onycholysis: no  Splinter Hemorrhage: no  Extra-articular manifestations include: none  SAPHO: no    Immunosuppression Screening (6/24/2020):  Quantiferon TB: negative  PPD:  Not performed  Hepatitis B: negative  Hepatitis C: negative    Therapy History includes:  Current NSAIDs include: Celebrex 200 mg twice daily  Current DMARD include: methotrexate 25 mg every Friday (6/24/2020 to present), Cosentyx 300 mg every 30 days (6/2018 to 9/2019; loss of patient assistance, 1/03/2020 to present)  Prior DMARD therapy includes: sulfasalazine 1000 mg twice daily (6/27/2012 to 8/13/2014; 12/02/2014 to 500 mg twice daily;10/02/2014 to 12/02/2014), methotrexate 20 mg every Saturday (8/13/2014 to 5/27/2015; insurance coverage, 6/28/2019 to 7/28/2019; did not refill, 8/29/2019 to 6/24/2020),  Enbrel, Humira, Remicade, Otezla 30 mg twice daily (5/2015 to 6/2018), Stelara  The following DMARDs have been ineffective: sulfasalazine, methotrexate PO, Otezla, Enbrel, Humira, Remicade, Stelara   The following DMARDs were stopped because of side effects: none    Active problems include:    Patient Active Problem List   Diagnosis Code    Diabetes ([facility] E11.9    Psoriasis L40.9    Psoriatic arthritis ([facility] L40.50    Hyperlipidemia E78.5    Long-term use of immunosuppressant medication Z79.899    PPD positive R76.11    Tobacco use Z72.0    Long term current use of non-steroidal anti-inflammatories (NSAID) Z79.1    Primary osteoarthritis of both knees M17.0    Class 1 obesity due to excess calories with serious comorbidity and body mass index (BMI) of 31.0 to 31.9 in adult E66.09, Z68.31    Gastroesophageal reflux disease K21.9     HISTORY OF PRESENT ILLNESS    Mr. Feng Holloway returns for a follow-up visit. On his last visit, I changed his methotrexate from 20 mg PO to 25 mg SubQ every Saturday and continued Cosentyx 300 mg every 30 days, which he has taken good tolerance. Today, he does not feel much improvement. Yesterday, he noticed burning pain in his DIPs associated with swelling. He denies pain in his wrists or remaining hands. At night, he may have pain in his right elbow. reports feeling no difference with his methotrexate dose increase. He continues to complain of pain in his right elbow. He has not followed up with orthopedics    He has increased his smoking from one cigarette per day to one pack over 3 days. He denies fever, weight loss, blurred vision, vision loss, oral ulcers, ankle swelling, dry cough, dyspnea, nausea, vomiting, dysphagia, abdominal pain, black or bloody stool, fall since last visit, rash, easy bruising and increased thirst.    Most recent toxicity labs on 6/24/2020 revealed no abnormalities. The patient has not had any interval hospital admissions, infections, or surgeries. REVIEW OF SYSTEMS    A comprehensive review of systems was performed and pertinent results are documented in the HPI, review of systems is otherwise non-contributory. PAST MEDICAL HISTORY    He has a past medical history of Arthritis, Diabetes ([facility] (5/27/2009), Elevated liver enzymes, Hypercholesterolemia, Psoriasis (5/27/2009), and Psoriatic arthritis ([facility] (5/27/2009). FAMILY HISTORY    His family history includes Asthma in his sister; Diabetes in his father and paternal grandmother; No Known Problems in his brother and mother. SOCIAL HISTORY    He reports that he has been smoking cigarettes. He has a 10.00 pack-year smoking history.  He has never used smokeless tobacco. He reports that he does not drink alcohol or use drugs.    IMMUNIZATIONS  Immunization History   Administered Date(s) Administered    H1N1 Influenza Virus Vaccine 10/27/2009    Influenza Vaccine Split 10/10/2012, 10/19/2012    Pneumococcal Polysaccharide (PPSV-23) 2015       MEDICATIONS    Current Outpatient Medications   Medication Sig Dispense Refill    folic acid (FOLVITE) 1 mg tablet Take 1 Tab by mouth daily. 90 Tab 1    [START ON 2020] methotrexate 25 mg/mL chemo injection 1 mL by SubCUTAneous route Every Friday. 12 Vial 1    ixekizumab (Taltz Autoinjector, 2 Pack,) 80 mg/mL auto injector Taltz Loading at weeks 0 to 2: Inject 160 mg (two 80 mg) syringes once 2 Syringe 0    ixekizumab (Taltz Autoinjector) 80 mg/mL auto injector 1 mL by SubCUTAneous route every fourteen (14) days. Taltz Loading at weeks 2 to 12: Inject 80 mg on week 2, 4, 6, 8, 10, 12 6 Syringe 0    ixekizumab (Taltz Autoinjector) 80 mg/mL auto injector 1 mL by SubCUTAneous route every thirty (30) days. Taltz Maintenance after week 12 of loadin mg every 4 weeks. 1 Syringe 11    celecoxib (CELEBREX) 200 mg capsule TAKE 1 CAPSULE BY MOUTH TWICE A DAY AS NEEDED FOR PAIN WITH FOOD 180 Cap 1    acetaminophen (TYLENOL) 500 mg tablet TAKE 1 TO 2 TABLETS BY MOUTH 3 TIMES A DAY AS NEEDED FOR PAIN 90 Tab 0    metFORMIN ER (GLUCOPHAGE XR) 500 mg tablet TAKE 1 TAB BY MOUTH DAILY (WITH DINNER). 90 Tab 1    atorvastatin (LIPITOR) 20 mg tablet TAKE 1 TABLET BY MOUTH EVERY DAY 90 Tab 1    Omeprazole delayed release (PRILOSEC D/R) 20 mg tablet Take 1 Tab by mouth daily. 30 Tab 2    secukinumab (COSENTYX PEN, 2 PENS,) 150 mg/mL pnij 300 mg by SubCUTAneous route every four (4) weeks. Indications: psoriasis associated with arthritis 4 Syringe 0    Blood-Glucose Meter monitoring kit Use to check blood sugar daily. Dx E11.9. Fill per insurance formulary preference. 1 Kit 0    glucose blood VI test strips (BLOOD GLUCOSE TEST) strip Use to check blood sugar daily. Dx E11.9.  Pharmacist to choose based on insurance/glucose monitoring kit type 100 Strip 5    lancets misc Use to check blood sugar daily. Dx E11.9. Fill per insurance formulary preference. 100 Each 11    amitriptyline (ELAVIL) 50 mg tablet TAKE 1 TABLET BY MOUTH EVERYDAY AT BEDTIME  3    JARDIANCE 10 mg tablet TAKE 1 TABLET BY MOUTH EVERY DAY  5    nicotine (NICODERM CQ) 21 mg/24 hr APPLY 1 PATCH EVERY DAY AS DIRECTED  2    glimepiride (AMARYL) 2 mg tablet Take 4 mg by mouth every morning.  SITagliptin (JANUVIA) 100 mg tablet Take 100 mg by mouth daily.  glucose blood VI test strips (ASCENSIA AUTODISC VI, ONE TOUCH ULTRA TEST VI) strip Twice a day 1 Package 12        ALLERGIES    Allergies   Allergen Reactions    Iodinated Contrast Media Shortness of Breath       PHYSICAL EXAMINATION    Visit Vitals  /67   Pulse 83   Temp 97.7 °F (36.5 °C)   Resp 18   Wt 211 lb (95.7 kg)   BMI 31.16 kg/m²     Body mass index is 31.16 kg/m². General: Patient is alert, oriented x 3, not in acute distress    HEENT:   Sclerae are not injected and appear moist.  There is no alopecia. Cardiovascular:  Heart is regular rate and rhythm, no murmurs. Chest:  Lungs are clear to auscultation bilaterally. Extremities:  Free of clubbing, cyanosis, edema  Skin:    Psoriasis:     Yes (large patch lateral posterior right Achilles)  Nail Pitting:     no  Onycholysis:     Yes (LEFT: 1st, 3rd, 5th, RIGHT: 4th)  Splinter Hemorrhage:   Yes (LEFT: 5th)  Palmoplantar pustulosis:   no  Acne fulminans:    no  Acne conglobata:    no  Hidradenitis Suppurativa:   no  Dissecting cellulitis of the scalp:  no  Pilonidal sinus:    no  Erythema nodosum:    no    Musculoskeletal:  A comprehensive musculoskeletal exam was performed for all joints of each upper and lower extremity and assessed for swelling, tenderness and range of motion.       Right elbow flexion deformity  Bilateral knee crepitus with some decrease ROM of the right knee Costochondritis:  no   Synovitis:   yes (see table. Bilateral MTPs (RESOLVED). DIP LEFT: 2nd, 3rd, 4th, 5th; RIGHT: 2nd, 3rd, 4th)  Dactylitis:   no  Enthesitis:   no    Joint Count 9/16/2020 6/24/2020 1/3/2020 5/29/2019   Left elbow - Tender 1 - - -   Left elbow - Swollen 0 - - -   Left wrist- Tender - 1 1 -   Left wrist- Swollen - 0 1 -   Left 5th MCP - Tender - 1 - -   Left 5th MCP - Swollen - 0 - -   Left thumb IP - Tender - 1 - 1   Left thumb IP - Swollen - 1 - 0   Left 2nd PIP - Tender - - - 1   Left 2nd PIP - Swollen - - - 0   Left 3rd PIP - Tender 1 - - -   Left 3rd PIP - Swollen 1 - - -   Left 4th PIP - Tender - - - 1   Left 4th PIP - Swollen - - - 0   Left 5th PIP - Tender - - - 1   Left 5th PIP - Swollen - - - 0   Right elbow - Tender 1 1 1 1   Right elbow - Swollen 1 1 1 1   Right thumb IP - Tender - 1 - -   Right thumb IP - Swollen - 0 - -   Right 3rd PIP - Tender 1 1 - -   Right 3rd PIP - Swollen 1 0 - -   Tender Joint Count (Total) 4 6 2 5   Swollen Joint Count (Total) 3 2 2 1     DATA REVIEW    Laboratory     Recent laboratory results were reviewed, summarized, and discussed with the patient. Imaging    Musculoskeletal Ultrasound    None    Radiographs    Bilateral Hand 5/29/2019: LEFT: No fracture or dislocation on plain film. No joint space narrowing. No joint space erosion or periosteal reaction. Alignment is within normal limits. Bone mineralization is decreased. No soft tissue calcification. RIGHT: No fracture or dislocation on plain film. No joint space narrowing. There is no joint space erosion. There is subtle periostitis distal phalanx of the middle finger. Alignment is within normal limits. Bone mineralization is decreased. No soft tissue calcification. Chest 3/30/2019: Cardiac monitoring wires overlie the thorax. The cardiomediastinal and hilar contours are within normal limits. The pulmonary vasculature is within normal limits. The lungs and pleural spaces are clear.  The visualized bones and upper abdomen are age-appropriate. Right Elbow 12/15/2014: a moderate right elbow effusion.  A nondisplaced fracture of the radial head is suspected. No dislocation is shown. CT Imaging    CT Head without contrast 9/10/2010: normal ventricles and basal cisterns. No intracranial masses or hemorrhages are seen. No focal intraparenchymal low density abnormalities are seen. MR Imaging    MRI Right Elbow without contrast 12/22/2014: there is a large complex appearing elbow joint effusion. There is  diffuse chondral thinning. Mild diffuse osseous edema is shown. The elbow collateral ligaments are intact. No tendon derangement is demonstrated. No soft tissue mass is shown. DXA     None    ASSESSMENT AND PLAN    This is a follow-up visit for Mr. Jayne Ramachandran. 1) Psoriatic Arthritis. (psoriasis, arthritis) He is maintained on methotrexate 25 mg SubQ every Saturday and Cosentyx 300 mg monthly, with good tolerance and feels about the same. He continues to have active arthritis (right elbow, DIPs) and psoriasis (right posterior distal leg) but his feet have improved. I will change Cosentyx to Rhoderick Scarlet. I ordered labs. 2) Long Term Use of Immunosuppressants. The patient remains on immunomodulatory medications (methotrexate, Cosentyx) and requires frequent toxicity monitoring by blood work. 3) Bilateral Knee Osteoarthritis. I had referred him to physical therapy. This was not an active issue today. 4) Long Term Use of NSAIDs. The patient remains on Celebrex. His most recent renal function was normal. I will repeat. I refilled it. 5) Tobacco Use. He is up to 1 PP over 3 days from smoking 1 to 2 cigarettes a day. I counseled cessation. The patient voiced understanding of the aforementioned assessment and plan.      TODAY'S ORDERS    Orders Placed This Encounter    METHOTREXATE POLYGLUTAMATES    METABOLIC PANEL, COMPREHENSIVE    CBC WITH AUTOMATED DIFF    C REACTIVE PROTEIN, QT  SED RATE (ESR)    folic acid (FOLVITE) 1 mg tablet    methotrexate 25 mg/mL chemo injection    ixekizumab (Taltz Autoinjector, 2 Pack,) 80 mg/mL auto injector    ixekizumab (Taltz Autoinjector) 80 mg/mL auto injector    ixekizumab (Taltz Autoinjector) 80 mg/mL auto injector    celecoxib (CELEBREX) 200 mg capsule     Future Appointments   Date Time Provider [facility]   10/7/2020 10:40 AM José Paris MD [facility] AMB   12/17/2020  2:00 PM José Paris MD [facility] AMB     Lyn Vee MD, [address]    Adult Rheumatology   Rheumatology Ultrasound Certified  [facility]  A Part of [address]   Phone [phone]  Fax [phone]

## 2020-09-16 NOTE — PATIENT INSTRUCTIONS
I will change Cosentyx to Taltz injections.     Continue methotrexate injection    Please discuss with Dr. Lori Christie for your right elbow

## 2020-09-22 ENCOUNTER — DOCUMENTATION ONLY (OUTPATIENT)
Dept: PHARMACY | Age: 50
End: 2020-09-22

## 2020-09-22 NOTE — PROGRESS NOTES
[facility] [address] [facility] Update    Date: 09/22/20    Amrik Spence 1970    Medication: Taltz 80 mg/ml pen    Prescription transferred to specialty pharmacy: [facility]    Phone: [phone], [phone]    Pharmacist counseled the patient and informed patient their prescription was transferred to the mandated specialty pharmacy and gave patient the specialty pharmacy's phone number. Pharmacist answered any questions that the patient had.     Pilo Uribe, [address] at [facility], [address] [place], [address]  phone: [phone]   fax: [phone]

## 2020-10-05 ENCOUNTER — DOCUMENTATION ONLY (OUTPATIENT)
Dept: RHEUMATOLOGY | Age: 50
End: 2020-10-05

## 2020-10-05 NOTE — PROGRESS NOTES
The office received a fax from [facility] stating Shakila Mask autoinjector 2 pack is approved from 9/17/2020-9/17/2021.

## 2020-10-06 ENCOUNTER — TRANSCRIBE ORDER (OUTPATIENT)
Dept: SCHEDULING | Age: 50
End: 2020-10-06

## 2020-10-06 DIAGNOSIS — M54.12 CERVICAL RADICULITIS: Primary | ICD-10-CM

## 2020-11-15 RX ORDER — CELECOXIB 200 MG/1
CAPSULE ORAL
Qty: 180 CAP | Refills: 1 | Status: SHIPPED | OUTPATIENT
Start: 2020-11-15 | End: 2022-01-02

## 2020-12-17 ENCOUNTER — OFFICE VISIT (OUTPATIENT)
Dept: RHEUMATOLOGY | Age: 50
End: 2020-12-17
Payer: MEDICAID

## 2020-12-17 VITALS
BODY MASS INDEX: 32.49 KG/M2 | RESPIRATION RATE: 18 BRPM | TEMPERATURE: 97.3 F | HEART RATE: 83 BPM | WEIGHT: 220 LBS | SYSTOLIC BLOOD PRESSURE: 108 MMHG | DIASTOLIC BLOOD PRESSURE: 59 MMHG

## 2020-12-17 DIAGNOSIS — L40.9 PSORIASIS: ICD-10-CM

## 2020-12-17 DIAGNOSIS — L40.50 PSORIATIC ARTHRITIS (HCC): Primary | ICD-10-CM

## 2020-12-17 DIAGNOSIS — Z79.60 LONG-TERM USE OF IMMUNOSUPPRESSANT MEDICATION: ICD-10-CM

## 2020-12-17 DIAGNOSIS — M19.021 ARTHRITIS OF RIGHT ELBOW: ICD-10-CM

## 2020-12-17 PROCEDURE — 20605 DRAIN/INJ JOINT/BURSA W/O US: CPT | Performed by: INTERNAL MEDICINE

## 2020-12-17 PROCEDURE — 99215 OFFICE O/P EST HI 40 MIN: CPT | Performed by: INTERNAL MEDICINE

## 2020-12-17 RX ORDER — TRIAMCINOLONE ACETONIDE 40 MG/ML
40 INJECTION, SUSPENSION INTRA-ARTICULAR; INTRAMUSCULAR ONCE
Qty: 1 ML | Refills: 0
Start: 2020-12-17 | End: 2020-12-17

## 2020-12-17 RX ORDER — FOLIC ACID 1 MG/1
1 TABLET ORAL DAILY
Qty: 90 TAB | Refills: 5 | Status: SHIPPED | OUTPATIENT
Start: 2020-12-17 | End: 2021-12-01 | Stop reason: SDUPTHER

## 2020-12-17 RX ORDER — METHOTREXATE 25 MG/ML
25 INJECTION, SOLUTION INTRA-ARTERIAL; INTRAMUSCULAR; INTRAVENOUS
Qty: 12 VIAL | Refills: 1 | Status: SHIPPED | OUTPATIENT
Start: 2020-12-18 | End: 2021-10-14 | Stop reason: SDUPTHER

## 2020-12-17 RX ORDER — LIDOCAINE HYDROCHLORIDE 10 MG/ML
1 INJECTION INFILTRATION; PERINEURAL ONCE
Qty: 1 VIAL | Refills: 0
Start: 2020-12-17 | End: 2020-12-17

## 2020-12-17 NOTE — PATIENT INSTRUCTIONS
Please call the Patient Care Scheduling Team [phone] to schedule your test (MRI cervical spine)    Please continue to rest the joint for a few more days before resuming regular activities. It may be more painful for the first 1-2 days. Watch for fever, or increased swelling or persistent pain.  Call or return to clinic as needed if such symptoms occur or there is failure to improve as anticipated.

## 2020-12-17 NOTE — LETTER
12/17/2020 Patient: Sherman Lambert YOB: 1970 Date of Visit: 12/17/2020 Herson Arthur MD 
[address] [address] [address] [facility] [address] Dear Herson Arthur MD, Thank you for referring Mr. Tiffanie Hopper to [address] [place] [address] for evaluation. My notes for this consultation are attached. If you have questions, please do not hesitate to call me. I look forward to following your patient along with you.  
 
 
Sincerely, 
 
Julio César Salcedo MD

## 2020-12-24 LAB
ALBUMIN SERPL-MCNC: 4.9 G/DL (ref 4–5)
ALBUMIN/GLOB SERPL: 2.2 {RATIO} (ref 1.2–2.2)
ALP SERPL-CCNC: 127 IU/L (ref 39–117)
ALT SERPL-CCNC: 38 IU/L (ref 0–44)
AST SERPL-CCNC: 26 IU/L (ref 0–40)
BASOPHILS # BLD AUTO: 0.1 X10E3/UL (ref 0–0.2)
BASOPHILS NFR BLD AUTO: 1 %
BILIRUB SERPL-MCNC: 0.4 MG/DL (ref 0–1.2)
BUN SERPL-MCNC: 23 MG/DL (ref 6–24)
BUN/CREAT SERPL: 32 (ref 9–20)
CALCIUM SERPL-MCNC: 9.1 MG/DL (ref 8.7–10.2)
CHLORIDE SERPL-SCNC: 103 MMOL/L (ref 96–106)
CO2 SERPL-SCNC: 18 MMOL/L (ref 20–29)
CREAT SERPL-MCNC: 0.72 MG/DL (ref 0.76–1.27)
CRP SERPL-MCNC: 1 MG/L (ref 0–10)
EOSINOPHIL # BLD AUTO: 0.2 X10E3/UL (ref 0–0.4)
EOSINOPHIL NFR BLD AUTO: 2 %
ERYTHROCYTE [DISTWIDTH] IN BLOOD BY AUTOMATED COUNT: 12.8 % (ref 11.6–15.4)
ERYTHROCYTE [SEDIMENTATION RATE] IN BLOOD BY WESTERGREN METHOD: 11 MM/HR (ref 0–15)
GLOBULIN SER CALC-MCNC: 2.2 G/DL (ref 1.5–4.5)
GLUCOSE SERPL-MCNC: 194 MG/DL (ref 65–99)
HCT VFR BLD AUTO: 50.4 % (ref 37.5–51)
HGB BLD-MCNC: 16.9 G/DL (ref 13–17.7)
IMM GRANULOCYTES # BLD AUTO: 0 X10E3/UL (ref 0–0.1)
IMM GRANULOCYTES NFR BLD AUTO: 0 %
LYMPHOCYTES # BLD AUTO: 2.3 X10E3/UL (ref 0.7–3.1)
LYMPHOCYTES NFR BLD AUTO: 31 %
MCH RBC QN AUTO: 30.2 PG (ref 26.6–33)
MCHC RBC AUTO-ENTMCNC: 33.5 G/DL (ref 31.5–35.7)
MCV RBC AUTO: 90 FL (ref 79–97)
METHOTREXATE PG1: 64.1 NMOL/L RBC
METHOTREXATE PG2: 34.28 NMOL/L RBC
METHOTREXATE PG3: 63.08 NMOL/L RBC
METHOTREXATE PG4: 14.56 NMOL/L RBC
METHOTREXATE PG5: 3.57 NMOL/L RBC
METHOTREXATE-PG TOTAL: 179.59 NMOL/L RBC
MONOCYTES # BLD AUTO: 0.8 X10E3/UL (ref 0.1–0.9)
MONOCYTES NFR BLD AUTO: 10 %
MTXPGSRA INTERPRETATION: NORMAL
NEUTROPHILS # BLD AUTO: 4.2 X10E3/UL (ref 1.4–7)
NEUTROPHILS NFR BLD AUTO: 56 %
PLATELET # BLD AUTO: 258 X10E3/UL (ref 150–450)
POTASSIUM SERPL-SCNC: 4.2 MMOL/L (ref 3.5–5.2)
PROT SERPL-MCNC: 7.1 G/DL (ref 6–8.5)
RBC # BLD AUTO: 5.59 X10E6/UL (ref 4.14–5.8)
SODIUM SERPL-SCNC: 138 MMOL/L (ref 134–144)
WBC # BLD AUTO: 7.5 X10E3/UL (ref 3.4–10.8)

## 2020-12-28 ENCOUNTER — HOSPITAL ENCOUNTER (OUTPATIENT)
Dept: MRI IMAGING | Age: 50
Discharge: HOME | End: 2020-12-28
Attending: PHYSICAL MEDICINE & REHABILITATION
Payer: MEDICAID

## 2020-12-28 DIAGNOSIS — M54.12 CERVICAL RADICULITIS: ICD-10-CM

## 2020-12-28 PROCEDURE — 72141 MRI NECK SPINE W/O DYE: CPT

## 2021-01-13 ENCOUNTER — TELEPHONE (OUTPATIENT)
Dept: FAMILY MEDICINE CLINIC | Age: 51
End: 2021-01-13

## 2021-01-13 NOTE — TELEPHONE ENCOUNTER
Outbound call to patient. Patient states his GERD medication is not working and still experiencing some burning when swallowing hot beverages. Advised patient that he will need to be evaluated. Understanding was verbalized. Patients appointment has been scheduled for 01/14/2021. No further action is required at this time.

## 2021-01-13 NOTE — TELEPHONE ENCOUNTER
Patient called complaining of burning sensation abdomen area, due to acid problem, when he drinks a hot beverage. He states it is damaging his liver. Patient is requesting meds for the burning in the abdomen, he has had this problem for several months. The last few days have been worse than ever, he cannot sleep or eat.      [facility]#[phone]

## 2021-01-14 ENCOUNTER — OFFICE VISIT (OUTPATIENT)
Dept: FAMILY MEDICINE CLINIC | Age: 51
End: 2021-01-14
Payer: MEDICAID

## 2021-01-14 VITALS
SYSTOLIC BLOOD PRESSURE: 100 MMHG | RESPIRATION RATE: 18 BRPM | OXYGEN SATURATION: 94 % | HEART RATE: 73 BPM | WEIGHT: 221 LBS | TEMPERATURE: 97.3 F | DIASTOLIC BLOOD PRESSURE: 60 MMHG | HEIGHT: 69 IN | BODY MASS INDEX: 32.73 KG/M2

## 2021-01-14 DIAGNOSIS — K21.9 GASTROESOPHAGEAL REFLUX DISEASE, UNSPECIFIED WHETHER ESOPHAGITIS PRESENT: Primary | ICD-10-CM

## 2021-01-14 PROCEDURE — 99213 OFFICE O/P EST LOW 20 MIN: CPT | Performed by: FAMILY MEDICINE

## 2021-01-14 RX ORDER — FAMOTIDINE 40 MG/1
40 TABLET, FILM COATED ORAL
Qty: 30 TAB | Refills: 0 | Status: SHIPPED | OUTPATIENT
Start: 2021-01-14 | End: 2021-03-19

## 2021-01-14 RX ORDER — OMEPRAZOLE 40 MG/1
40 CAPSULE, DELAYED RELEASE ORAL
Qty: 30 CAP | Refills: 0 | Status: SHIPPED | OUTPATIENT
Start: 2021-01-14 | End: 2021-03-19

## 2021-01-14 NOTE — PROGRESS NOTES
Identified pt with two pt identifiers(name and ). Reviewed record in preparation for visit and have obtained necessary documentation. Chief Complaint   Patient presents with    Abdominal Pain     Patient c/o burning        Vitals:    21 1346   BP: 100/60   Pulse: 73   Resp: 18   Temp: 97.3 °F (36.3 °C)   TempSrc: Temporal   SpO2: 94%   Weight: 221 lb (100.2 kg)   Height: 5' 9\" (1.753 m)   PainSc:   6   PainLoc: Abdomen       Health Maintenance Due   Topic    DTaP/Tdap/Td series (1 - Tdap)    Eye Exam Retinal or Dilated     Flu Vaccine (1)    A1C test (Diabetic or Prediabetic)     Lipid Screen     Foot Exam Q1     MICROALBUMIN Q1        Coordination of Care Questionnaire:  :   1) Have you been to an emergency room, urgent care, or hospitalized since your last visit? If yes, where when, and reason for visit? no       2. Have seen or consulted any other health care provider since your last visit? If yes, where when, and reason for visit? YES      Patient is accompanied by self I have received verbal consent from [address] to discuss any/all medical information while they are present in the room.

## 2021-01-14 NOTE — PROGRESS NOTES
HISTORY OF PRESENT ILLNESS  Rl Rodrigues is a 53 y.o. male. seen for burning sharp pain in upper abdomen with heart burn. HPI  GERD  Patient complains of gastroesophageal reflux with abdominal bloating, early satiety, fullness after meals, heartburn, midepigastric pain, nocturnal burning and symptoms primarily relate to meals, and lying down after meals. Symptoms have been present for several years. He denies dysphagia. He  has not lost weight. He denies melena, hematochezia, hematemesis, and coffee ground emesis. This has been associated with upper abdominal discomfort and h/o smoking, diabetes, and psoriatic arthritis ( long term use of NSAID). He denies belching and eructation, bilious reflux, chest pain, choking on food, difficulty swallowing, dysphagia, hematemesis, odynophagia, regurgitation of undigested food, shortness of breath and waterbrash. Medical therapy in the past has included proton pump inhibitors. Review of Systems   Constitutional: Negative for chills, fever and malaise/fatigue. HENT: Negative for congestion, ear pain, sore throat and tinnitus. Eyes: Negative for blurred vision, double vision, pain and discharge. Respiratory: Negative for cough, shortness of breath and wheezing. Cardiovascular: Negative for chest pain, palpitations and leg swelling. Gastrointestinal: Positive for abdominal pain and heartburn. Negative for blood in stool, constipation, diarrhea, nausea and vomiting. Genitourinary: Negative for dysuria, frequency, hematuria and urgency. Musculoskeletal: Negative for back pain, joint pain and myalgias. Skin: Negative for rash. Neurological: Negative for dizziness, tremors, seizures and headaches. Endo/Heme/Allergies: Negative for polydipsia. Does not bruise/bleed easily. Psychiatric/Behavioral: Negative for depression and substance abuse. The patient is not nervous/anxious. Physical Exam  Vitals signs and nursing note reviewed. Constitutional:       Appearance: He is well-developed. He is not diaphoretic. HENT:      Head: Normocephalic and atraumatic. Right Ear: External ear normal.      Mouth/Throat:      Pharynx: No oropharyngeal exudate. Eyes:      General: No scleral icterus. Conjunctiva/sclera: Conjunctivae normal.      Pupils: Pupils are equal, round, and reactive to light. Neck:      Musculoskeletal: Normal range of motion and neck supple. Thyroid: No thyromegaly. Vascular: No JVD. Cardiovascular:      Rate and Rhythm: Normal rate and regular rhythm. Heart sounds: Normal heart sounds. No murmur. Pulmonary:      Effort: Pulmonary effort is normal.      Breath sounds: Normal breath sounds. No wheezing. Abdominal:      General: Bowel sounds are normal. There is no distension. Palpations: Abdomen is soft. There is no mass. Tenderness: There is abdominal tenderness in the epigastric area. Musculoskeletal: Normal range of motion. General: No tenderness. Lymphadenopathy:      Cervical: No cervical adenopathy. Skin:     General: Skin is warm and dry. Findings: No rash. Neurological:      Mental Status: He is alert and oriented to person, place, and time. Cranial Nerves: No cranial nerve deficit. Deep Tendon Reflexes: Reflexes are normal and symmetric. Reflexes normal.         ASSESSMENT and PLAN  Diagnoses and all orders for this visit:    1. Gastroesophageal reflux disease, unspecified whether esophagitis present  -     omeprazole (PRILOSEC) 40 mg capsule; Take 1 Cap by mouth Daily (before breakfast). -     famotidine (PEPCID) 40 mg tablet; Take 1 Tab by mouth nightly.   -     H PYLORI AG, STOOL; Future  -     REFERRAL TO GASTROENTEROLOGY    discussed ddx including diabetic gastroparesis vs h pylori infection vs GERD vs peptic ulcer  Will check for H Pylori and referred to GI  Discussed lifestyle issues and health guidance given  Patient was given an after visit summary which includes diagnoses, vital signs, current medications, instructions and references & authorized prescriptions . Results of labs will be conveyed to patient, once available. Pt verbalized instructions I provided and expressed understanding of discussion that was held today.

## 2021-01-14 NOTE — PATIENT INSTRUCTIONS
H. Pylori Bacterial Infection: Care Instructions  Your Care Instructions     Your test shows the presence of Helicobacter pylori ( H. pylori), a kind of bacterium that lives in the lining of the stomach. Many people have H. pylori in their stomachs and do not develop problems. But sometimes H. pylori causes an upset stomach or a sore (ulcer) in the stomach lining. Most stomach ulcers are caused by H. pylori. Symptoms of an ulcer include gnawing or burning pain in the belly that can last minutes or hours. Eating food or taking antacids helps relieve the pain, but the symptoms may come back after a while. Antibiotic medicine can cure an H. pylori infection. Follow-up care is a key part of your treatment and safety. Be sure to make and go to all appointments, and call your doctor if you are having problems. It's also a good idea to know your test results and keep a list of the medicines you take. How can you care for yourself at home? · Take your antibiotics as directed. Do not stop taking them just because you feel better. You need to take the full course of antibiotics. · If your doctor prescribes other medicine, take it exactly as prescribed. Call your doctor if you think you are having a problem with your medicine. You will get more details on the specific medicine your doctor prescribes. · Eat a healthy, balanced diet. ? Eat smaller meals, and eat more often. Be sure to eat at least three meals a day. ? Avoid heavily spiced or greasy foods. ? Do not drink beverages that have caffeine if they bother your stomach. These include coffee, tea, and soda. · Do not smoke. Smoking slows the healing of your ulcer and can make an ulcer come back. If you need help quitting, talk to your doctor about stop-smoking programs and medicines. These can increase your chances of quitting for good. · Limit how much alcohol you drink. Alcohol can slow healing of an ulcer and can make your symptoms worse.   · Wash your hands after going to the bathroom. · Avoid aspirin, ibuprofen, or other anti-inflammatory medicines, because they can irritate the stomach. If you need pain medicine, try acetaminophen (Tylenol). When should you call for help? Call 911 anytime you think you may need emergency care. For example, call if:    · You vomit blood or what looks like coffee grounds.     · Your stools are maroon or very bloody. Call your doctor now or seek immediate medical care if:    · You have new or worse belly pain.     · You are vomiting.     · Your stools are black and look like tar, or they have streaks of blood. Watch closely for changes in your health, and be sure to contact your doctor if:    · You do not get better as expected. Where can you learn more? Go to http://www.gray.com/  Enter [id number] in the search box to learn more about \"H. Pylori Bacterial Infection: Care Instructions. \"  Current as of: April 15, 2020               Content Version: 12.6  © 2006-2020 [employer]. Care instructions adapted under license by [employer] (which disclaims liability or warranty for this information). If you have questions about a medical condition or this instruction, always ask your healthcare professional. [address] any warranty or liability for your use of this information.

## 2021-01-23 LAB
H PYLORI AG STL QL IA: POSITIVE
SPECIMEN SOURCE: ABNORMAL

## 2021-01-24 DIAGNOSIS — K29.70 HELICOBACTER PYLORI GASTRITIS: Primary | ICD-10-CM

## 2021-01-24 DIAGNOSIS — B96.81 HELICOBACTER PYLORI GASTRITIS: Primary | ICD-10-CM

## 2021-01-24 RX ORDER — CLARITHROMYCIN 500 MG/1
500 TABLET, FILM COATED ORAL 2 TIMES DAILY
Qty: 28 TAB | Refills: 0 | Status: SHIPPED | OUTPATIENT
Start: 2021-01-24 | End: 2021-02-07

## 2021-01-24 RX ORDER — METRONIDAZOLE 500 MG/1
500 TABLET ORAL 3 TIMES DAILY
Qty: 42 TAB | Refills: 0 | Status: SHIPPED | OUTPATIENT
Start: 2021-01-24 | End: 2021-02-07

## 2021-01-24 NOTE — PROGRESS NOTES
Please inform patient that his stool is positive for H Pylori bacteria, and that may be reason is acid reflux is not getting better with PPI and H2 blocker. Will send Rx of antibiotic and he needs to complete 2 weeks course, to continue on high dose Omeprazole along with his antibiotics.   thanks

## 2021-01-26 NOTE — PROGRESS NOTES
Patient 2 identifiers confirmed. Informed of results and recommendations as noted by Dr. Piotr Wells. Patient had no other questions at time of call.  Margie Fenton LPN

## 2021-03-19 DIAGNOSIS — K21.9 GASTROESOPHAGEAL REFLUX DISEASE, UNSPECIFIED WHETHER ESOPHAGITIS PRESENT: ICD-10-CM

## 2021-03-19 RX ORDER — FAMOTIDINE 40 MG/1
TABLET, FILM COATED ORAL
Qty: 30 TAB | Refills: 0 | Status: SHIPPED | OUTPATIENT
Start: 2021-03-19 | End: 2021-05-12

## 2021-03-19 RX ORDER — OMEPRAZOLE 40 MG/1
40 CAPSULE, DELAYED RELEASE ORAL
Qty: 30 CAP | Refills: 0 | Status: SHIPPED | OUTPATIENT
Start: 2021-03-19 | End: 2021-03-31

## 2021-03-22 DIAGNOSIS — E11.9 TYPE 2 DIABETES MELLITUS WITHOUT COMPLICATION, WITHOUT LONG-TERM CURRENT USE OF INSULIN (HCC): ICD-10-CM

## 2021-03-22 NOTE — TELEPHONE ENCOUNTER
Last Visit: 1/14/21 MD Sarah White, labs done 12/2020  Next Appointment: Not scheduled  Previous Refill Encounter(s): 1/23/21 90    Requested Prescriptions     Pending Prescriptions Disp Refills    metFORMIN ER (GLUCOPHAGE XR) 500 mg tablet 90 Tab 0     Sig: Take 1 Tab by mouth daily (with dinner).

## 2021-03-25 RX ORDER — METFORMIN HYDROCHLORIDE 500 MG/1
500 TABLET, EXTENDED RELEASE ORAL
Qty: 90 TAB | Refills: 0 | Status: SHIPPED | OUTPATIENT
Start: 2021-03-25 | End: 2021-03-31 | Stop reason: SDUPTHER

## 2021-03-31 ENCOUNTER — OFFICE VISIT (OUTPATIENT)
Dept: FAMILY MEDICINE CLINIC | Age: 51
End: 2021-03-31
Payer: COMMERCIAL

## 2021-03-31 VITALS
WEIGHT: 217.4 LBS | BODY MASS INDEX: 32.2 KG/M2 | HEART RATE: 75 BPM | TEMPERATURE: 98.7 F | DIASTOLIC BLOOD PRESSURE: 72 MMHG | SYSTOLIC BLOOD PRESSURE: 104 MMHG | RESPIRATION RATE: 18 BRPM | OXYGEN SATURATION: 98 % | HEIGHT: 69 IN

## 2021-03-31 DIAGNOSIS — Z91.89 10 YEAR RISK OF MI OR STROKE 7.5% OR GREATER: ICD-10-CM

## 2021-03-31 DIAGNOSIS — E11.9 TYPE 2 DIABETES MELLITUS WITHOUT COMPLICATION, WITHOUT LONG-TERM CURRENT USE OF INSULIN (HCC): Primary | ICD-10-CM

## 2021-03-31 DIAGNOSIS — H93.8X9 SENSATION OF FULLNESS IN EAR, UNSPECIFIED LATERALITY: ICD-10-CM

## 2021-03-31 DIAGNOSIS — E78.5 HYPERLIPIDEMIA, UNSPECIFIED HYPERLIPIDEMIA TYPE: ICD-10-CM

## 2021-03-31 DIAGNOSIS — K21.9 GASTROESOPHAGEAL REFLUX DISEASE, UNSPECIFIED WHETHER ESOPHAGITIS PRESENT: ICD-10-CM

## 2021-03-31 DIAGNOSIS — E11.9 TYPE 2 DIABETES MELLITUS WITHOUT COMPLICATION, WITHOUT LONG-TERM CURRENT USE OF INSULIN (HCC): ICD-10-CM

## 2021-03-31 DIAGNOSIS — E66.09 CLASS 1 OBESITY DUE TO EXCESS CALORIES WITH SERIOUS COMORBIDITY AND BODY MASS INDEX (BMI) OF 31.0 TO 31.9 IN ADULT: ICD-10-CM

## 2021-03-31 LAB
CHOLEST SERPL-MCNC: 252 MG/DL
CREAT UR-MCNC: 92.5 MG/DL
EST. AVERAGE GLUCOSE BLD GHB EST-MCNC: 171 MG/DL
HBA1C MFR BLD: 7.6 % (ref 4–5.6)
HDLC SERPL-MCNC: 44 MG/DL
HDLC SERPL: 5.7 {RATIO} (ref 0–5)
LDLC SERPL CALC-MCNC: 165.8 MG/DL (ref 0–100)
LIPID PROFILE,FLP: ABNORMAL
MICROALBUMIN UR-MCNC: 0.59 MG/DL
MICROALBUMIN/CREAT UR-RTO: 6 MG/G (ref 0–30)
TRIGL SERPL-MCNC: 211 MG/DL (ref ?–150)
VLDLC SERPL CALC-MCNC: 42.2 MG/DL

## 2021-03-31 PROCEDURE — 99214 OFFICE O/P EST MOD 30 MIN: CPT | Performed by: FAMILY MEDICINE

## 2021-03-31 RX ORDER — METFORMIN HYDROCHLORIDE 500 MG/1
500 TABLET, EXTENDED RELEASE ORAL 2 TIMES DAILY WITH MEALS
Qty: 180 TAB | Refills: 0 | Status: SHIPPED | OUTPATIENT
Start: 2021-03-31 | End: 2021-04-05 | Stop reason: SDUPTHER

## 2021-03-31 NOTE — PROGRESS NOTES
[place] [facility] Note    Assessment/ Plan:   Diagnoses and all orders for this visit:    1. Type 2 diabetes mellitus without complication, without long-term current use of insulin (HCC)  -     MICROALBUMIN, UR, RAND W/ MICROALB/CREAT RATIO; Future  -     HEMOGLOBIN A1C WITH EAG; Future  -     LIPID PANEL; Future  -      DIABETES FOOT EXAM  -     metFORMIN ER (GLUCOPHAGE XR) 500 mg tablet; Take 1 Tab by mouth two (2) times daily (with meals). 2. Hyperlipidemia, unspecified hyperlipidemia type  -     LIPID PANEL; Future    3. Gastroesophageal reflux disease, unspecified whether esophagitis present    4. Sensation of fullness in ear, unspecified laterality  -     REFERRAL TO ENT-OTOLARYNGOLOGY    5. Class 1 obesity due to excess calories with serious comorbidity and body mass index (BMI) of 31.0 to 31.9 in adult      Recommendations based on history, physical exam and review of pertinent labs, studies and medications:   Labs to eval end organ function and etiology of chronic/acute concerns. Diabetes chronic, asymptomatic. Foot exam normal today. Increase Metformin, continue off Januvia, continue taking glimepiride and Jardiance. A1c lab to monitor. GERD, uncontrolled.  Continue Pepcid for now. Follow up with GI per routine. Labs to monitor hyperlipidemia. Fullness in ear with normal examination. Referred to ENT for clarity of diagnosis. Diet and lifestyle modification encouraged for weight loss and chronic disease prevention/ management.    Follow up with specialists per routine- optho, dentist, ortho. Educated patient on red flag symptoms to warrant return to clinic or emergency room visit. I have discussed the diagnosis with the patient and the intended plan as seen in the above orders. The patient has been offered or received an after-visit summary and questions were answered concerning future plans.   I have discussed medication side effects and warnings with the patient as well. Follow-up and Dispositions    · Return in about 3 months (around 6/30/2021) for Follow Up diabetes. Subjective:     Chief Complaint   Patient presents with    Diabetes     follow up    Dioni Nguyen is a 53y.o. year old male who presents for evaluation of the following:    GERD:   Chronic > 1 year  Sx: back bash, burning  Trigger green tea , after eating, cooking, smoking  Tx: famotidine and omeprazole  Obese    Ear Fullness  left side more than right  History of cerumen impaction  Denies ringing or pain    Diabetes:   Chronic for over 10 year  Previous Tx: Farxiga, Insulin  Home monitoring 120-240  Key Antihyperglycemic Medications             metFORMIN ER (GLUCOPHAGE XR) 500 mg tablet (Taking) Take 1 Tab by mouth daily (with dinner). JARDIANCE 10 mg tablet (Taking) TAKE 1 TABLET BY MOUTH EVERY DAY    glimepiride (AMARYL) 2 mg tablet (Taking) Take 4 mg by mouth every morning. SITagliptin (JANUVIA) 100 mg tablet (Taking) Take 100 mg by mouth daily. Lab Results   Component Value Date/Time    Hemoglobin A1c 7.0 (H) 05/16/2017 10:04 AM    Hemoglobin A1c (POC) 7.7 11/26/2019 04:00 PM     Obesity  \"Does not bother me\"  Diet: unrestricted  Activity: None  Last Weight Metrics:  Weight Loss Metrics 3/31/2021 1/14/2021 12/17/2020 9/16/2020 6/24/2020 2/4/2020 1/3/2020   Today's Wt 217 lb 6.4 oz 221 lb 220 lb 211 lb 220 lb 215 lb 207 lb   BMI 32.1 kg/m2 32.64 kg/m2 32.49 kg/m2 31.16 kg/m2 32.49 kg/m2 31.75 kg/m2 30.57 kg/m2       Social:   Employment- works as [employer]   Lives wife and 3 children.       Patient Care Team:  Amber Edwards MD as PCP - General (Family Practice)  Amber Edwards MD as PCP - [facility]  Juarez Collins MD as Physician (Rheumatology)  Winter Boyer MD as Physician (Dermatology)  Noemi Arthur MD as Physician (Orthopedic Surgery)   Dentist- [facility]  Optho- [facility]    Review of Systems   Pertinent positives and negative per HPI. All other systems  reviewed are negative for a Comprehensive ROS (10+). Past medical history, social history, family history reviewed. Medications reconciled. Objective:     Vitals:    03/31/21 1107   BP: 104/72   Pulse: 75   Resp: 18   Temp: 98.7 °F (37.1 °C)   TempSrc: Temporal   SpO2: 98%   Weight: 217 lb 6.4 oz (98.6 kg)   Height: 5' 9\" (1.753 m)       Physical Examination:  General: Alert, cooperative, no distress, appears stated age. Obese  Eyes: Conjunctivae clear. Pupils equally round and reactive to light, Extraocular muscles intact. Ears: Normal external ear canals both ears. Tympanic membranes clear and mobile bilaterally   Nose: Nares normal. Septum midline. Mucosa normal. No drainage or sinus tenderness. Mouth/Throat: Lips, mucosa, and tongue normal. No oropharyngeal erythema. No tonsillar enlargement or exudate. Neck: Supple, symmetrical, trachea midline, no adenopathy. No thyroid enlargement/tenderness/nodules  Respiratory: Breathing comfortably, in no acute respiratory distress. Clear to auscultation bilaterally. Normal inspiratory and expiratory ratio. Cardiovascular: Regular rate and rhythm, S1, S2 normal, no murmur, click, rub or gallop.   -Extremities no edema. Pulses 2+ and symmetric radial and dorsalis pedis   Abdomen: Soft, non-tender, not distended. Bowel sounds normal. No masses or organomegaly. MSK: Extremities normal appearing, atraumatic, no effusion. Gait steady and unassisted. Skin: Skin color, texture, turgor normal. No rashes or lesions on exposed skin. Lymph nodes: Cervical, supraclavicular nodes normal.  Neurologic: Cranial nerves II-XII intact. Strength 5/5 grossly. Sensation and reflexes normal throughout. Psychiatric: Affect appropriate. Mood euthymic.  Thoughts logical. Speech volume and speed normal      Signed,    Mookie Andrews MD  3/31/2021

## 2021-03-31 NOTE — PROGRESS NOTES
Chief Complaint   Patient presents with    Diabetes     follow up    Ear Fullness       1. Have you been to the ER, urgent care clinic since your last visit? Hospitalized since your last visit? No    2. Have you seen or consulted any other health care providers outside of the [address] [place] [address] since your last visit? Include any pap smears or colon screening.  No    3 most recent PHQ Screens 1/14/2021   Little interest or pleasure in doing things Not at all   Feeling down, depressed, irritable, or hopeless Not at all   Total Score PHQ 2 0

## 2021-03-31 NOTE — PATIENT INSTRUCTIONS
Weight Loss Tips:  Remember this is like a part time job so your motivation and commitment is key to your success. Mindset  Weight loss like any other behavior change starts in your mind. Think hard about what your motivates you to lose weight then meditate on that. Remind yourself of your motivation  with phone alarms, scheduled meditation time, vision board, journal- just to name a few ideas. Have realistic goals. We expect with diligent healthy diet and physical activity you can lose 5% of your body weight in 3  months. Wt in lbs x 0.05 = #lbs you should lose in 3 months. Make food and activity changes with a goal of CONSISTENCY not perfection. Food  Start eating differently. Most of your weight loss and gain is from what you eat. Use small plates only  Drink 2 liters (1/2 gallon) of water every day  HALF of every meal should be fruit or vegetables  Try meal prepping on Sunday (or your day off) with new different vegetables. Consider meal prep service such as Cle[employer], wepremeals. com  Replace soda with diet soda or other zero sugar drinks (selter water just fine)  Consider using the [employer] dereck for calorie counting. Goal 1320-3838 calories per day    Activity  Staying physically active will help you lose more weight and can help you get over the plateau when you weight just  won't change any more with diet. Start exercise at least 5 days per week for 40 minutes. Consider PLDT training dereck for home exercises. You can start with walking. I suggest walking at a speed of at least 3.5-4.5mph to for the weight loss benefit. Increase your speed or distance every 2 weeks. Do some slow stretching daily of legs, arms and back. Consider adding weight training with light weights at home or at the gym. See a doctor or a physical training for  instructions in order to avoid injuries from doing muscle training incorrectly.   Free fitness program in [place]: [employer] org/program/fitness-warriors/         Learning About Eating More Fruits and Vegetables  What are some quick tips for eating more fruits and vegetables? We're all encouraged to eat more fruits and vegetables. Yet it can seem like one more chore on the daily to-do list. But you can add color and crunch to your meals--and lots of nutrition--with these quick tips. · Brighten up your breakfast.  ? Add sliced fruit or frozen berries to your yogurt, pancakes, or cereal.  ? Blend fresh or frozen fruit, veggies, and yogurt with a little fruit juice, and you've got a tasty smoothie. ? Make your scrambled eggs a gourmet treat by adding onions, celery, and bell peppers. ? Bake up some bran muffins with grated carrots added into the mix. · Make a livelier lunch. ? Jazz up tuna or chicken salad with apple chunks, celery, or grapes--or all of them! ? Add cucumbers, avocado slices, tomatoes, and lettuce to your sandwiches. ? Kick up the flavor of grilled cheese sandwiches with spinach and tomatoes. ? Puree some potatoes or squash to add to tomato soup. · Add delicious veggies to dinner. ? Give more color and taste to salads. Stir in red cabbage, carrots, and bell peppers. Top salads with dried cranberries or raisins. \"Frost\" your salad with orange sections or strawberries. ? Keep a bag or two of frozen vegetables ready to pull out of the freezer for a side dish. ? Spice up spaghetti and meatballs with mushrooms and bell peppers. ? Roast vegetables like cauliflower or squash in the oven with olive oil to bring out their flavor. ? Season your veggie dish with herbs like basil and pj and a splash of lemon juice and olive oil. ? If you've got a main dish in the oven, stick in a potato to round out your meal.  · Grab some healthy snacks on the go. ? Scoop up an apple, banana, or plum for a quick snack. ? Cut up raw fruits and veggies to keep in your fridge.  Grapes, oranges, carrots, and celery are great choices. They'll be ready for a quick snack or an after-school treat. ? Dip raw vegetables in hummus or peanut butter. ? Keep dried fruit on hand for an easy \"take with you\" snack. · Make something sweet--and healthy. ? Try baked apples or pears topped with cinnamon and honey for a delicious dessert. ? Make chocolate chip cookies even better with grated carrots added to the mix. Where can you learn more? Go to http://www.gray.com/  Enter F050 in the search box to learn more about \"Learning About Eating More Fruits and Vegetables. \"  Current as of: August 22, 2019               Content Version: 12.6  © 5083-5485 [employer]. Care instructions adapted under license by [employer] (which disclaims liability or warranty for this information). If you have questions about a medical condition or this instruction, always ask your healthcare professional. [place] [address] any warranty or liability for your use of this information. Learning About Meal Planning for Diabetes  Why plan your meals? Meal planning can be a key part of managing diabetes. Planning meals and snacks with the right balance of carbohydrate, protein, and fat can help you keep your blood sugar at the target level you set with your doctor. You don't have to eat special foods. You can eat what your family eats, including sweets once in a while. But you do have to pay attention to how often you eat and how much you eat of certain foods. You may want to work with a dietitian or a certified diabetes educator. He or she can give you tips and meal ideas and can answer your questions about meal planning. This health professional can also help you reach a healthy weight if that is one of your goals. What plan is right for you? Your dietitian or diabetes educator may suggest that you start with the plate format or carbohydrate counting.   The plate format  The plate format is a simple way to help you manage how you eat. You plan meals by learning how much space each food should take on a plate. Using the plate format helps you spread carbohydrate throughout the day. It can make it easier to keep your blood sugar level within your target range. It also helps you see if you're eating healthy portion sizes.  To use the plate format, you put non-starchy vegetables on half your plate. Add meat or meat substitutes on one-quarter of the plate. Put a grain or starchy vegetable (such as brown rice or a potato) on the final quarter of the plate. You can add a small piece of fruit and some low-fat or fat-free milk or yogurt, depending on your carbohydrate goal for each meal.  Here are some tips for using the plate format:  · Make sure that you are not using an oversized plate. A 9-inch plate is best. Many restaurants use larger plates.  · Get used to using the plate format at home. Then you can use it when you eat out.  · Write down your questions about using the plate format. Talk to your doctor, a dietitian, or a diabetes educator about your concerns.  Carbohydrate counting  With carbohydrate counting, you plan meals based on the amount of carbohydrate in each food. Carbohydrate raises blood sugar higher and more quickly than any other nutrient. It is found in desserts, breads and cereals, and fruit. It's also found in starchy vegetables such as potatoes and corn, grains such as rice and pasta, and milk and yogurt. Spreading carbohydrate throughout the day helps keep your blood sugar levels within your target range.  Your daily amount depends on several things, including your weight, how active you are, which diabetes medicines you take, and what your goals are for your blood sugar levels. A registered dietitian or diabetes educator can help you plan how much carbohydrate to include in each meal and snack.  A guideline for your daily amount of carbohydrate is:  · 45 to  60 grams at each meal. That's about the same as 3 to 4 carbohydrate servings. · 15 to 20 grams at each snack. That's about the same as 1 carbohydrate serving. The Nutrition Facts label on packaged foods tells you how much carbohydrate is in a serving of the food. First, look at the serving size on the food label. Is that the amount you eat in a serving? All of the nutrition information on a food label is based on that serving size. So if you eat more or less than that, you'll need to adjust the other numbers. Total carbohydrate is the next thing you need to look for on the label. If you count carbohydrate servings, one serving of carbohydrate is 15 grams. For foods that don't come with labels, such as fresh fruits and vegetables, you'll need a guide that lists carbohydrate in these foods. Ask your doctor, dietitian, or diabetes educator about books or other nutrition guides you can use. If you take insulin, you need to know how many grams of carbohydrate are in a meal. This lets you know how much rapid-acting insulin to take before you eat. If you use an insulin pump, you get a constant rate of insulin during the day. So the pump must be programmed at meals to give you extra insulin to cover the rise in blood sugar after meals. When you know how much carbohydrate you will eat, you can take the right amount of insulin. Or, if you always use the same amount of insulin, you need to make sure that you eat the same amount of carbohydrate at meals. If you need more help to understand carbohydrate counting and food labels, ask your doctor, dietitian, or diabetes educator. How do you get started with meal planning? Here are some tips to get started:  · Plan your meals a week at a time. Don't forget to include snacks too. · Use cookbooks or online recipes to plan several main meals. Plan some quick meals for busy nights. You also can double some recipes that freeze well.  Then you can save half for other busy nights when you don't have time to cook. · Make sure you have the ingredients you need for your recipes. If you're running low on basic items, put these items on your shopping list too. · List foods that you use to make breakfasts, lunches, and snacks. List plenty of fruits and vegetables. · Post this list on the refrigerator. Add to it as you think of more things you need. · Take the list to the store to do your weekly shopping. Follow-up care is a key part of your treatment and safety. Be sure to make and go to all appointments, and call your doctor if you are having problems. It's also a good idea to know your test results and keep a list of the medicines you take. Where can you learn more? Go to http://www.gray.com/  Enter J127 in the search box to learn more about \"Learning About Meal Planning for Diabetes. \"  Current as of: December 20, 2019               Content Version: 12.6  © 1481-1137 [employer]. Care instructions adapted under license by [employer] (which disclaims liability or warranty for this information). If you have questions about a medical condition or this instruction, always ask your healthcare professional. [address] any warranty or liability for your use of this information.

## 2021-04-01 RX ORDER — ATORVASTATIN CALCIUM 20 MG/1
TABLET, FILM COATED ORAL
Qty: 90 TAB | Refills: 1 | Status: SHIPPED | OUTPATIENT
Start: 2021-04-01 | End: 2021-04-05 | Stop reason: DRUGHIGH

## 2021-04-05 RX ORDER — METFORMIN HYDROCHLORIDE 500 MG/1
500 TABLET, EXTENDED RELEASE ORAL 2 TIMES DAILY WITH MEALS
Qty: 180 TAB | Refills: 1 | Status: SHIPPED | OUTPATIENT
Start: 2021-04-05 | End: 2022-03-10 | Stop reason: SDUPTHER

## 2021-04-05 RX ORDER — EMPAGLIFLOZIN 10 MG/1
10 TABLET, FILM COATED ORAL DAILY
Qty: 90 TAB | Refills: 1 | Status: SHIPPED | OUTPATIENT
Start: 2021-04-05 | End: 2021-12-16 | Stop reason: DRUGHIGH

## 2021-04-05 RX ORDER — GLIMEPIRIDE 4 MG/1
4 TABLET ORAL
Qty: 90 TAB | Refills: 1 | Status: SHIPPED | OUTPATIENT
Start: 2021-04-05 | End: 2022-03-10 | Stop reason: SDUPTHER

## 2021-04-05 RX ORDER — ATORVASTATIN CALCIUM 40 MG/1
40 TABLET, FILM COATED ORAL DAILY
Qty: 90 TAB | Refills: 3 | Status: SHIPPED | OUTPATIENT
Start: 2021-04-05

## 2021-04-05 NOTE — PROGRESS NOTES
Outbound call to patient name and  verified. Patient was advised of recent test results also that two medications have increased in dose.

## 2021-04-12 ENCOUNTER — IMMUNIZATION (OUTPATIENT)
Dept: INTERNAL MEDICINE CLINIC | Age: 51
End: 2021-04-12
Payer: MEDICAID

## 2021-04-12 DIAGNOSIS — Z23 ENCOUNTER FOR IMMUNIZATION: Primary | ICD-10-CM

## 2021-04-12 PROCEDURE — 0001A COVID-19, MRNA, LNP-S, PF, 30MCG/0.3ML DOSE(PFIZER): CPT | Performed by: FAMILY MEDICINE

## 2021-04-12 PROCEDURE — 91300 COVID-19, MRNA, LNP-S, PF, 30MCG/0.3ML DOSE(PFIZER): CPT | Performed by: FAMILY MEDICINE

## 2021-04-29 ENCOUNTER — TRANSCRIBE ORDER (OUTPATIENT)
Dept: SCHEDULING | Age: 51
End: 2021-04-29

## 2021-04-29 DIAGNOSIS — H69.80 DYSFUNCTION OF EUSTACHIAN TUBE: Primary | ICD-10-CM

## 2021-05-03 ENCOUNTER — IMMUNIZATION (OUTPATIENT)
Dept: INTERNAL MEDICINE CLINIC | Age: 51
End: 2021-05-03
Payer: COMMERCIAL

## 2021-05-03 DIAGNOSIS — Z23 ENCOUNTER FOR IMMUNIZATION: Primary | ICD-10-CM

## 2021-05-03 PROCEDURE — 0002A COVID-19, MRNA, LNP-S, PF, 30MCG/0.3ML DOSE(PFIZER): CPT | Performed by: FAMILY MEDICINE

## 2021-05-03 PROCEDURE — 91300 COVID-19, MRNA, LNP-S, PF, 30MCG/0.3ML DOSE(PFIZER): CPT | Performed by: FAMILY MEDICINE

## 2021-05-12 DIAGNOSIS — K21.9 GASTROESOPHAGEAL REFLUX DISEASE, UNSPECIFIED WHETHER ESOPHAGITIS PRESENT: ICD-10-CM

## 2021-05-12 RX ORDER — FAMOTIDINE 40 MG/1
TABLET, FILM COATED ORAL
Qty: 30 TAB | Refills: 0 | Status: SHIPPED | OUTPATIENT
Start: 2021-05-12 | End: 2021-07-08

## 2021-07-08 DIAGNOSIS — K21.9 GASTROESOPHAGEAL REFLUX DISEASE, UNSPECIFIED WHETHER ESOPHAGITIS PRESENT: ICD-10-CM

## 2021-07-08 DIAGNOSIS — L40.50 PSORIATIC ARTHRITIS (HCC): ICD-10-CM

## 2021-07-08 RX ORDER — PEN NEEDLE, DIABETIC 29 G X1/2"
NEEDLE, DISPOSABLE MISCELLANEOUS
Qty: 10 SYRINGE | Refills: 4 | Status: SHIPPED | OUTPATIENT
Start: 2021-07-08

## 2021-07-08 RX ORDER — FAMOTIDINE 40 MG/1
TABLET, FILM COATED ORAL
Qty: 30 TABLET | Refills: 0 | Status: SHIPPED | OUTPATIENT
Start: 2021-07-08 | End: 2022-03-07

## 2021-08-26 ENCOUNTER — TELEPHONE (OUTPATIENT)
Dept: RHEUMATOLOGY | Age: 51
End: 2021-08-26

## 2021-09-01 DIAGNOSIS — L40.50 PSORIATIC ARTHRITIS (HCC): ICD-10-CM

## 2021-09-01 RX ORDER — IXEKIZUMAB 80 MG/ML
80 INJECTION, SOLUTION SUBCUTANEOUS
Qty: 1 EACH | Refills: 11 | OUTPATIENT
Start: 2021-09-01

## 2021-10-12 ENCOUNTER — HOSPITAL ENCOUNTER (EMERGENCY)
Age: 51
Discharge: HOME | End: 2021-10-12
Attending: EMERGENCY MEDICINE
Payer: COMMERCIAL

## 2021-10-12 ENCOUNTER — TELEPHONE (OUTPATIENT)
Dept: FAMILY MEDICINE CLINIC | Age: 51
End: 2021-10-12

## 2021-10-12 ENCOUNTER — HOSPITAL ENCOUNTER (EMERGENCY)
Dept: GENERAL RADIOLOGY | Age: 51
Discharge: HOME | End: 2021-10-12
Attending: EMERGENCY MEDICINE
Payer: COMMERCIAL

## 2021-10-12 VITALS
DIASTOLIC BLOOD PRESSURE: 68 MMHG | SYSTOLIC BLOOD PRESSURE: 107 MMHG | HEART RATE: 56 BPM | TEMPERATURE: 97.9 F | RESPIRATION RATE: 18 BRPM | OXYGEN SATURATION: 97 %

## 2021-10-12 DIAGNOSIS — R55 VASOVAGAL SYNCOPE: ICD-10-CM

## 2021-10-12 DIAGNOSIS — M79.642 BILATERAL HAND PAIN: Primary | ICD-10-CM

## 2021-10-12 DIAGNOSIS — M79.641 BILATERAL HAND PAIN: Primary | ICD-10-CM

## 2021-10-12 LAB
ALBUMIN SERPL-MCNC: 4 G/DL (ref 3.5–5)
ALBUMIN/GLOB SERPL: 1 {RATIO} (ref 1.1–2.2)
ALP SERPL-CCNC: 106 U/L (ref 45–117)
ALT SERPL-CCNC: 60 U/L (ref 12–78)
ANION GAP SERPL CALC-SCNC: 7 MMOL/L (ref 5–15)
AST SERPL-CCNC: 35 U/L (ref 15–37)
BASOPHILS # BLD: 0 K/UL (ref 0–0.1)
BASOPHILS NFR BLD: 0 % (ref 0–1)
BILIRUB SERPL-MCNC: 0.5 MG/DL (ref 0.2–1)
BUN SERPL-MCNC: 16 MG/DL (ref 6–20)
BUN/CREAT SERPL: 18 (ref 12–20)
CALCIUM SERPL-MCNC: 9.4 MG/DL (ref 8.5–10.1)
CHLORIDE SERPL-SCNC: 105 MMOL/L (ref 97–108)
CO2 SERPL-SCNC: 24 MMOL/L (ref 21–32)
CREAT SERPL-MCNC: 0.9 MG/DL (ref 0.7–1.3)
DIFFERENTIAL METHOD BLD: NORMAL
EOSINOPHIL # BLD: 0.1 K/UL (ref 0–0.4)
EOSINOPHIL NFR BLD: 1 % (ref 0–7)
ERYTHROCYTE [DISTWIDTH] IN BLOOD BY AUTOMATED COUNT: 12.8 % (ref 11.5–14.5)
GLOBULIN SER CALC-MCNC: 4 G/DL (ref 2–4)
GLUCOSE BLD STRIP.AUTO-MCNC: 243 MG/DL (ref 65–117)
GLUCOSE SERPL-MCNC: 170 MG/DL (ref 65–100)
HCT VFR BLD AUTO: 39.2 % (ref 36.6–50.3)
HGB BLD-MCNC: 13.4 G/DL (ref 12.1–17)
IMM GRANULOCYTES # BLD AUTO: 0 K/UL (ref 0–0.04)
IMM GRANULOCYTES NFR BLD AUTO: 0 % (ref 0–0.5)
LYMPHOCYTES # BLD: 1.6 K/UL (ref 0.8–3.5)
LYMPHOCYTES NFR BLD: 20 % (ref 12–49)
MCH RBC QN AUTO: 29.2 PG (ref 26–34)
MCHC RBC AUTO-ENTMCNC: 34.2 G/DL (ref 30–36.5)
MCV RBC AUTO: 85.4 FL (ref 80–99)
MONOCYTES # BLD: 0.7 K/UL (ref 0–1)
MONOCYTES NFR BLD: 9 % (ref 5–13)
NEUTS SEG # BLD: 5.3 K/UL (ref 1.8–8)
NEUTS SEG NFR BLD: 70 % (ref 32–75)
NRBC # BLD: 0 K/UL (ref 0–0.01)
NRBC BLD-RTO: 0 PER 100 WBC
PLATELET # BLD AUTO: 188 K/UL (ref 150–400)
PMV BLD AUTO: 9.2 FL (ref 8.9–12.9)
POTASSIUM SERPL-SCNC: 3.9 MMOL/L (ref 3.5–5.1)
PROT SERPL-MCNC: 8 G/DL (ref 6.4–8.2)
RBC # BLD AUTO: 4.59 M/UL (ref 4.1–5.7)
SERVICE CMNT-IMP: ABNORMAL
SODIUM SERPL-SCNC: 136 MMOL/L (ref 136–145)
WBC # BLD AUTO: 7.7 K/UL (ref 4.1–11.1)

## 2021-10-12 PROCEDURE — 96374 THER/PROPH/DIAG INJ IV PUSH: CPT

## 2021-10-12 PROCEDURE — 96375 TX/PRO/DX INJ NEW DRUG ADDON: CPT

## 2021-10-12 PROCEDURE — 82962 GLUCOSE BLOOD TEST: CPT

## 2021-10-12 PROCEDURE — 74011250637 HC RX REV CODE- 250/637: Performed by: EMERGENCY MEDICINE

## 2021-10-12 PROCEDURE — 73130 X-RAY EXAM OF HAND: CPT

## 2021-10-12 PROCEDURE — 85025 COMPLETE CBC W/AUTO DIFF WBC: CPT

## 2021-10-12 PROCEDURE — 74011250636 HC RX REV CODE- 250/636: Performed by: EMERGENCY MEDICINE

## 2021-10-12 PROCEDURE — 80053 COMPREHEN METABOLIC PANEL: CPT

## 2021-10-12 PROCEDURE — 99284 EMERGENCY DEPT VISIT MOD MDM: CPT

## 2021-10-12 PROCEDURE — 36415 COLL VENOUS BLD VENIPUNCTURE: CPT

## 2021-10-12 RX ORDER — ONDANSETRON 2 MG/ML
4 INJECTION INTRAMUSCULAR; INTRAVENOUS
Status: COMPLETED | OUTPATIENT
Start: 2021-10-12 | End: 2021-10-12

## 2021-10-12 RX ORDER — GABAPENTIN 100 MG/1
300 CAPSULE ORAL
Status: COMPLETED | OUTPATIENT
Start: 2021-10-12 | End: 2021-10-12

## 2021-10-12 RX ORDER — GABAPENTIN 300 MG/1
300 CAPSULE ORAL 3 TIMES DAILY
Qty: 20 CAPSULE | Refills: 0 | Status: SHIPPED | OUTPATIENT
Start: 2021-10-12 | End: 2021-12-06

## 2021-10-12 RX ORDER — KETOROLAC TROMETHAMINE 30 MG/ML
30 INJECTION, SOLUTION INTRAMUSCULAR; INTRAVENOUS
Status: DISCONTINUED | OUTPATIENT
Start: 2021-10-12 | End: 2021-10-12

## 2021-10-12 RX ORDER — KETOROLAC TROMETHAMINE 30 MG/ML
15 INJECTION, SOLUTION INTRAMUSCULAR; INTRAVENOUS
Status: COMPLETED | OUTPATIENT
Start: 2021-10-12 | End: 2021-10-12

## 2021-10-12 RX ADMIN — GABAPENTIN 300 MG: 100 CAPSULE ORAL at 10:24

## 2021-10-12 RX ADMIN — ONDANSETRON 4 MG: 2 INJECTION INTRAMUSCULAR; INTRAVENOUS at 09:06

## 2021-10-12 RX ADMIN — KETOROLAC TROMETHAMINE 15 MG: 30 INJECTION, SOLUTION INTRAMUSCULAR at 09:07

## 2021-10-12 RX ADMIN — SODIUM CHLORIDE 1000 ML: 9 INJECTION, SOLUTION INTRAVENOUS at 09:06

## 2021-10-12 NOTE — ED TRIAGE NOTES
Triage: Pt arrives ambulatory from home with CC of pain in the fingers in his right hand. He reports he has a hx psoriatic arthritis but the pain is more severe now than usual. He describes the pain as a burning. He has called both his family doctor and rheumatologist but he cannot get an appointment for some time.

## 2021-10-12 NOTE — ED PROVIDER NOTES
HPI   Patient is a 55-year-old male with past medical history significant for psoriatic arthritis, type 2 diabetes, elevated liver enzymes and hypercholesterolemia who presents to the ED reporting bilateral hand pain for several days. He states he attempted to get in with his rheumatologist and his primary care cannot see them for 1 to 2 weeks. He states the pain kept him awake all night. He denies any falls, direct injury or blunt trauma. He states he has not been able to check his sugar because his machine is not working.   He has been taking his Celebrex without relief   Past Medical History:   Diagnosis Date    Arthritis     Diabetes ([facility] 5/27/2009    Elevated liver enzymes     resolved     Hypercholesterolemia     Psoriasis 5/27/2009    Psoriatic arthritis ([facility] 5/27/2009       Past Surgical History:   Procedure Laterality Date    HX CATARACT REMOVAL      right         Family History:   Problem Relation Age of Onset    Diabetes Paternal Grandmother     No Known Problems Mother     Diabetes Father     Asthma Sister     No Known Problems Brother        Social History     Socioeconomic History    Marital status:      Spouse name: Not on file    Number of children: Not on file    Years of education: Not on file    Highest education level: Not on file   Occupational History    Not on file   Tobacco Use    Smoking status: Current Every Day Smoker     Packs/day: 1.00     Years: 10.00     Pack years: 10.00     Types: Cigarettes    Smokeless tobacco: Never Used   Vaping Use    Vaping Use: Never used   Substance and Sexual Activity    Alcohol use: No    Drug use: No    Sexual activity: Yes     Partners: Female   Other Topics Concern    Not on file   Social History Narrative    Not on file     Social Determinants of Health     Financial Resource Strain:     Difficulty of Paying Living Expenses:    Food Insecurity:     Worried About Running Out of Food in the Last Year:     Sangeeta van Food in the Last Year:    Transportation Needs:     Lack of Transportation (Medical):  Lack of Transportation (Non-Medical):    Physical Activity:     Days of Exercise per Week:     Minutes of Exercise per Session:    Stress:     Feeling of Stress :    Social Connections:     Frequency of Communication with Friends and Family:     Frequency of Social Gatherings with Friends and Family:     Attends Muslim Services:     Active Member of Clubs or Organizations:     Attends Club or Organization Meetings:     Marital Status:    Intimate Partner Violence:     Fear of Current or Ex-Partner:     Emotionally Abused:     Physically Abused:     Sexually Abused: ALLERGIES: Iodinated contrast media    Review of Systems   Constitutional: Negative for activity change, appetite change, fever and unexpected weight change. HENT: Negative for congestion and trouble swallowing. Eyes: Negative for visual disturbance. Respiratory: Negative for cough and shortness of breath. Cardiovascular: Negative for chest pain, palpitations and leg swelling. Gastrointestinal: Negative for abdominal pain, constipation, diarrhea, nausea and vomiting. Genitourinary: Negative for dysuria. Musculoskeletal: Positive for arthralgias and joint swelling. Negative for myalgias. Neurological: Negative for dizziness, light-headedness and headaches. All other systems reviewed and are negative. Vitals:    10/12/21 0735   BP: 116/83   Pulse: 76   Resp: 16   Temp: 97.6 °F (36.4 °C)   SpO2: 96%            Physical Exam  Vitals and nursing note reviewed. Constitutional:       General: He is not in acute distress. Appearance: Normal appearance. He is normal weight. He is not ill-appearing, toxic-appearing or diaphoretic. Comments: Male; non smoker;    HENT:      Head: Normocephalic. Cardiovascular:      Rate and Rhythm: Normal rate and regular rhythm.    Pulmonary:      Effort: Pulmonary effort is normal.      Breath sounds: Normal breath sounds. Musculoskeletal:         General: Swelling and tenderness present. No deformity or signs of injury. Normal range of motion. Cervical back: Normal range of motion and neck supple. Comments: Reports bilateral finger tenderness particularly the right and left third and index fingers; Skin integrity is intact. There is no obvious new deformity, bruising or erythema. Good neurovascular sensation. No apparent tendon or nerve injury. Lymphadenopathy:      Cervical: No cervical adenopathy. Skin:     General: Skin is warm and dry. Neurological:      Mental Status: He is alert and oriented to person, place, and time. MDM       Procedures    XR HAND LT MIN 3 V    Result Date: 10/12/2021  Progressive joint space narrowing and erosive changes third and fourth PIP joints. No acute abnormality. XR HAND RT MIN 3 V    Result Date: 10/12/2021  Mild joint space narrowing now noted in the DIP and PIP joints as compared to the prior exam. No acute abnormality is identified. Labs Reviewed   METABOLIC PANEL, COMPREHENSIVE - Abnormal; Notable for the following components:       Result Value    Glucose 170 (*)     A-G Ratio 1.0 (*)     All other components within normal limits   GLUCOSE, POC - Abnormal; Notable for the following components:    Glucose (POC) 243 (*)     All other components within normal limits   CBC WITH AUTOMATED DIFF     EKG reveals sinus bradycardia with a ventricular rate of 53 without ectopy. Reviewed by Dr. Venice Guerra. Patient has been reexamined reports some relief of hand pain with medications given. He states that he has had previous vasovagal episodes when he has his blood drawn. Plan to discharge home on Neurontin and recommend close follow-up with rheumatologist for further evaluation and treatment. 5:47 PM  Patient's results and plan of care have been reviewed with him.   Patient and/or family have verbally conveyed their understanding and agreement of the patient's signs, symptoms, diagnosis, treatment and prognosis and additionally agree to follow up as recommended or return to the Emergency Room should his condition change prior to follow-up. Discharge instructions have also been provided to the patient with some educational information regarding his diagnosis as well a list of reasons why he would want to return to the ER prior to his follow-up appointment should his condition change.

## 2021-10-14 ENCOUNTER — VIRTUAL VISIT (OUTPATIENT)
Dept: RHEUMATOLOGY | Age: 51
End: 2021-10-14
Payer: COMMERCIAL

## 2021-10-14 ENCOUNTER — APPOINTMENT (OUTPATIENT)
Dept: FAMILY MEDICINE CLINIC | Age: 51
End: 2021-10-14

## 2021-10-14 DIAGNOSIS — Z79.60 LONG-TERM USE OF IMMUNOSUPPRESSANT MEDICATION: ICD-10-CM

## 2021-10-14 DIAGNOSIS — L40.50 PSORIATIC ARTHRITIS (HCC): Primary | ICD-10-CM

## 2021-10-14 DIAGNOSIS — Z11.59 ENCOUNTER FOR SCREENING FOR OTHER VIRAL DISEASES: ICD-10-CM

## 2021-10-14 PROCEDURE — 99215 OFFICE O/P EST HI 40 MIN: CPT | Performed by: INTERNAL MEDICINE

## 2021-10-14 RX ORDER — METHOTREXATE 25 MG/ML
25 INJECTION, SOLUTION INTRA-ARTERIAL; INTRAMUSCULAR; INTRAVENOUS
Qty: 12 ML | Refills: 1
Start: 2021-10-15 | End: 2021-12-01 | Stop reason: SDUPTHER

## 2021-10-14 RX ORDER — IXEKIZUMAB 80 MG/ML
80 INJECTION, SOLUTION SUBCUTANEOUS
Qty: 1 EACH | Refills: 11 | Status: SHIPPED | OUTPATIENT
Start: 2021-10-14 | End: 2021-10-18 | Stop reason: SDUPTHER

## 2021-10-14 NOTE — PROGRESS NOTES
REASON FOR VISIT    This is a follow-up visit for Mr. Mai Councilman for     ICD-10-CM   1. Psor[facility]atic arthritis ([facility] L40.50   2. Psoriasis L40.9     The patient has consented for synchronous (real-time) Telemedicine (audio-video technology) on 10/14/2021 for their care to be delivered over telemedicine in place of their regularly scheduled office visit pursuant to the emergency declaration under the [address] [place] [id number] waiver authority and the [employer] Act, this Virtual  Visit was conducted, with patient's consent, to reduce the patient's risk of exposure to COVID-19 and provide continuity of care for an established patient. Services were provided through a video synchronous discussion virtually to substitute for in-person clinic visit.     Spondyloarthritis phenotype includes:  Anti-CCP positive: no  Rheumatoid factor positive: no  HLA-B27 positive: no  Inflammatory Back Pain: no  Erosive disease: no  Sacroiliitis: no  Ankylosis: no  Psoriasis: yes  Enthesitis: no  Dactylitis: no  Nail Pitting: no  Onycholysis: no  Splinter Hemorrhage: no  Extra-articular manifestations include: none  SAPHO: no    Immunosuppression Screening (6/24/2020):  Quantiferon TB: negative  PPD:  Not performed  Hepatitis B: negative  Hepatitis C: negative    Therapy History includes:  Current NSAIDs include: Celebrex 200 mg twice daily  Current DMARD include: methotrexate 25 mg every Friday (6/24/2020 to 7/2021), Taltz 80 mg every 4 weeks (9/17/2020 to 7/2021)  Prior DMARD therapy includes: sulfasalazine 1000 mg twice daily (6/27/2012 to 8/13/2014; 12/02/2014 to 500 mg twice daily;10/02/2014 to 12/02/2014), methotrexate 20 mg every Saturday (8/13/2014 to 5/27/2015; insurance coverage, 6/28/2019 to 7/28/2019; did not refill, 8/29/2019 to 6/24/2020),  Enbrel, Humira, Remicade, Otezla 30 mg twice daily (5/2015 to 6/2018), Stelara, Cosentyx 300 mg every 30 days (6/2018 to 9/2019; loss of patient assistance, 1/03/2020 to 9/16/2020)  The following DMARDs have been ineffective: sulfasalazine, methotrexate PO, Otezla, Enbrel, Humira, Remicade, Stelara, Cosentyx   The following DMARDs were stopped because of side effects: none    HISTORY OF PRESENT ILLNESS    Mr. Louie Humphreys returns for a follow-up visit. On his last visit on 12/17/2020, I continued methotrexate 25 mg SubQ every Saturday and [place] M[place], which he has taken good tolerance. I injected his right elbow and asked him to follow up with Dr. Rosanna Marquez. He was on vacation for 3 months in [facility] and did well off methotrexate and [place] M[place] but when he returned, he flared in his hands. He was off [place] Mote for more than 3 months. Today, she has pain and swelling in his hands. He also reports frequent falls. He is smoking 5 to 10 cigarettes. Most recent toxicity monitoring by blood work was done on 10/12/2021 and did not reveal any significant adverse effects, except . I reviewed the patient's interval medical history, surgical history, medications, and allergies. The patient has not had any interval hospital admissions, infections, or surgeries. REVIEW OF SYSTEMS    A comprehensive review of systems was performed and pertinent results are documented in the HPI, review of systems is otherwise non-contributory. PAST MEDICAL HISTORY    He has a past medical history of Arthritis, Diabetes ([facility] [address] (5/27/2009), Elevated liver enzymes, Hypercholesterolemia, Psoriasis (5/27/2009), and Psoriatic arthritis ([facility] (5/27/2009). FAMILY HISTORY    His family history includes Asthma in his sister; Diabetes in his father and paternal grandmother; No Known Problems in his brother and mother. SOCIAL HISTORY    He reports that he has been smoking cigarettes. He has a 10.00 pack-year smoking history. He has never used smokeless tobacco. He reports that he does not drink alcohol and does not use drugs.     MEDICATIONS    Current Outpatient Medications   Medication Sig    ixekizumab (Taltz Autoinjector) 80 mg/mL auto injector 1 mL by SubCUTAneous route every thirty (30) days.  [START ON 10/15/2021] methotrexate 25 mg/mL chemo injection 1 mL by SubCUTAneous route Every Friday.  gabapentin (Neurontin) 300 mg capsule Take 1 Capsule by mouth three (3) times daily. Max Daily Amount: 900 mg.    famotidine (PEPCID) 40 mg tablet TAKE 1 TABLET BY MOUTH EVERY DAY AT NIGHT    BD Insulin Syringe Ultra-Fine 1 mL 31 gauge x 5/16 syrg 1 EACH BY DOES NOT APPLY ROUTE EVERY SATURDAY FOR 12 DOSES. TO BE USED FOR METHOTREXATE SOLUTION    glimepiride (AMARYL) 4 mg tablet Take 1 Tab by mouth every morning.  metFORMIN ER (GLUCOPHAGE XR) 500 mg tablet Take 1 Tab by mouth two (2) times daily (with meals).  Jardiance 10 mg tablet Take 1 Tab by mouth daily.  atorvastatin (LIPITOR) 40 mg tablet Take 1 Tab by mouth daily.  folic acid (FOLVITE) 1 mg tablet Take 1 Tab by mouth daily.  celecoxib (CELEBREX) 200 mg capsule TAKE 1 CAPSULE BY MOUTH TWICE A DAY AS NEEDED FOR PAIN WITH FOOD    acetaminophen (TYLENOL) 500 mg tablet TAKE 1 TO 2 TABLETS BY MOUTH 3 TIMES A DAY AS NEEDED FOR PAIN    Blood-Glucose Meter monitoring kit Use to check blood sugar daily. Dx E11.9. Fill per insurance formulary preference.  glucose blood VI test strips (BLOOD GLUCOSE TEST) strip Use to check blood sugar daily. Dx E11.9. Pharmacist to choose based on insurance/glucose monitoring kit type    lancets misc Use to check blood sugar daily. Dx E11.9. Fill per insurance formulary preference.  glucose blood VI test strips (ASCENSIA AUTODISC VI, ONE TOUCH ULTRA TEST VI) strip Twice a day     No current facility-administered medications for this visit. ALLERGIES    Allergies   Allergen Reactions    Iodinated Contrast Media Shortness of Breath       PHYSICAL EXAMINATION    There were no vitals taken for this visit.   There is no height or weight on file to calculate BMI. General: Patient is alert, oriented x 3, not in acute distress    HEENT:   Sclerae are not injected and appear moist.  There is no alopecia. Chest:  Breathing comfortably at room air    Skin:    Exam deferred  Previous exam    Psoriasis:     no (large patch lateral posterior right Achilles; RESOLVED)  Nail Pitting:     no  Onycholysis:     Yes (LEFT: 1st, 3rd, 5th, RIGHT: 4th)  Splinter Hemorrhage:   Yes (LEFT: 5th)  Palmoplantar pustulosis:   no  Acne fulminans:    no  Acne conglobata:    no  Hidradenitis Suppurativa:   no  Dissecting cellulitis of the scalp:  no  Pilonidal sinus:    no  Erythema nodosum:    no    Musculoskeletal:  A comprehensive musculoskeletal exam was NOT performed for all joints of each upper and lower extremity and assessed for swelling, tenderness and range of motion.       Previous exam    Right elbow flexion deformity  Bilateral knee crepitus without effusion    Costochondritis:  no   Synovitis:   yes (see table: DIP LEFT: 3rd, 4th)  Dactylitis:   no  Enthesitis:   no    Joint Count 12/17/2020 9/16/2020 6/24/2020 1/3/2020 5/29/2019   Left elbow - Tender - 1 - - -   Left elbow - Swollen - 0 - - -   Left wrist- Tender - - 1 1 -   Left wrist- Swollen - - 0 1 -   Left 5th MCP - Tender - - 1 - -   Left 5th MCP - Swollen - - 0 - -   Left thumb IP - Tender - - 1 - 1   Left thumb IP - Swollen - - 1 - 0   Left 2nd PIP - Tender - - - - 1   Left 2nd PIP - Swollen - - - - 0   Left 3rd PIP - Tender - 1 - - -   Left 3rd PIP - Swollen - 1 - - -   Left 4th PIP - Tender - - - - 1   Left 4th PIP - Swollen - - - - 0   Left 5th PIP - Tender - - - - 1   Left 5th PIP - Swollen - - - - 0   Right elbow - Tender 1 1 1 1 1   Right elbow - Swollen 1 1 1 1 1   Right 1st MCP - Tender 0 - - - -   Right 1st MCP - Swollen 1 - - - -   Right thumb IP - Tender - - 1 - -   Right thumb IP - Swollen - - 0 - -   Right 3rd PIP - Tender 1 1 1 - -   Right 3rd PIP - Swollen 1 1 0 - -   Tender Joint Count (Total) 2 4 6 2 5   Swollen Joint Count (Total) 3 3 2 2 1     DATA REVIEW    Laboratory     Recent laboratory results were reviewed, summarized, and discussed with the patient. Imaging    Musculoskeletal Ultrasound    None    Radiographs    Bilateral Hand 5/29/2019: LEFT: No fracture or dislocation on plain film. No joint space narrowing. No joint space erosion or periosteal reaction. Alignment is within normal limits. Bone mineralization is decreased. No soft tissue calcification. RIGHT: No fracture or dislocation on plain film. No joint space narrowing. There is no joint space erosion. There is subtle periostitis distal phalanx of the middle finger. Alignment is within normal limits. Bone mineralization is decreased. No soft tissue calcification. Chest 3/30/2019: Cardiac monitoring wires overlie the thorax. The cardiomediastinal and hilar contours are within normal limits. The pulmonary vasculature is within normal limits. The lungs and pleural spaces are clear. The visualized bones and upper abdomen are age-appropriate. Right Elbow 12/15/2014: a moderate right elbow effusion.  A nondisplaced fracture of the radial head is suspected. No dislocation is shown. CT Imaging    CT Head without contrast 9/10/2010: normal ventricles and basal cisterns. No intracranial masses or hemorrhages are seen. No focal intraparenchymal low density abnormalities are seen. MR Imaging    MRI Right Elbow without contrast 12/22/2014: there is a large complex appearing elbow joint effusion. There is  diffuse chondral thinning. Mild diffuse osseous edema is shown. The elbow collateral ligaments are intact. No tendon derangement is demonstrated. No soft tissue mass is shown. DXA     None    PROCEDURE     Right Elbow Kenalog 40 mg IA. . (12/17/2020)     ASSESSMENT AND PLAN    This is a follow-up visit for Mr. Elis Ricardo. 1) Psoriatic Arthritis.  (psoriasis, arthritis) He was maintained on methotrexate 25 mg SubQ every Saturday and Tatlz 80 mg monthly, with good tolerance and felt much better but has been off treatment for at least 3 months and has been flaring in his hands    I ordered annual Duong Cordova labs and instructed him to come pick them up to have drawn today, to evaluate for TB. I refilled methotrexate and Taltz. Phillip Mix 2) Long Term Use of Immunosuppressants. The patient will resume on high risk immunomodulatory medications (methotrexate, Taltz) and requires frequent toxicity monitoring by blood work to evaluate for toxicities. Respective labs were ordered (see below orders for details). 3) Bilateral Knee Osteoarthritis. I had referred him to physical therapy. This was not an active issue today. 4) Long Term Use of NSAIDs. The patient remains on Celebrex. His most recent renal function was normal.      5) Tobacco Use. He is smoking 5 to 10 cigarettes. 6) Cervical Radiculitis. He was following with [facility] and has not done his MRI cervical spine. I gave him the # to schedule it. The patient voiced understanding of the aforementioned assessment and plan. The patient has consented for synchronous (real-time) Telemedicine (audio-video technology) on 10/14/2021 for their care to be delivered over telemedicine in place of their regularly scheduled office visit pursuant to the emergency declaration under the [facility] waiver authority and the [employer] Act, this Virtual  Visit was conducted, with patient's consent, to reduce the patient's risk of exposure to COVID-19 and provide continuity of care for an established patient. A total of 45 minutes was spent on this visit, reviewing interval notes, interval testing results, ordering tests, refilling medications, documenting the findings in the note, patient education, counseling, and coordination of care as described above. All questions asked and answered. Services were provided through a video synchronous discussion virtually to substitute for in-person clinic visit.     TODAY'S ORDERS    Orders Placed This Encounter    QUANTIFERON-TB PLUS(CLIENT INCUB.)    HEP B SURFACE AG    HEP B SURFACE AB    HBV CORE AB, IGG/IGM    HEPATITIS C AB    HEPATITIS BE AB    ixekizumab (Taltz Autoinjector) 80 mg/mL auto injector    methotrexate 25 mg/mL chemo injection     Future Appointments   Date Time Provider Gale Daniella   1/18/2022  1:20 PM Marykay Bence, MD [facility] BS AMB     Philippe Cedeno MD, [address] [place] [address]    Adult Rheumatology   Rheumatology Ultrasound Certified  [facility], [address]   Phone [phone]  Fax [phone]

## 2021-10-15 LAB
HBV CORE AB SERPL QL IA: NEGATIVE
HBV CORE IGM SERPL QL IA: NEGATIVE
HBV E AB SERPL QL IA: NEGATIVE
HBV SURFACE AB SER QL: NON REACTIVE
HBV SURFACE AG SERPL QL IA: NEGATIVE
HCV AB S/CO SERPL IA: <0.1 S/CO RATIO (ref 0–0.9)

## 2021-10-17 LAB
GAMMA INTERFERON BACKGROUND BLD IA-ACNC: 0.12 IU/ML
M TB IFN-G BLD-IMP: NEGATIVE
M TB IFN-G CD4+ BCKGRND COR BLD-ACNC: 0.07 IU/ML
MITOGEN IGNF BLD-ACNC: >10 IU/ML
QUANTIFERON TB2 AG: 0.07 IU/ML
SERVICE CMNT-IMP: NORMAL

## 2021-10-18 ENCOUNTER — TRANSCRIBE ORDER (OUTPATIENT)
Dept: NEUROLOGY | Age: 51
End: 2021-10-18

## 2021-10-18 DIAGNOSIS — L40.50 PSORIATIC ARTHRITIS (HCC): ICD-10-CM

## 2021-10-18 RX ORDER — IXEKIZUMAB 80 MG/ML
80 INJECTION, SOLUTION SUBCUTANEOUS
Qty: 1 EACH | Refills: 11 | Status: SHIPPED | OUTPATIENT
Start: 2021-10-18 | End: 2021-10-20 | Stop reason: SDUPTHER

## 2021-10-20 ENCOUNTER — DOCUMENTATION ONLY (OUTPATIENT)
Dept: PHARMACY | Age: 51
End: 2021-10-20

## 2021-10-20 DIAGNOSIS — L40.50 PSORIATIC ARTHRITIS (HCC): ICD-10-CM

## 2021-10-20 RX ORDER — IXEKIZUMAB 80 MG/ML
80 INJECTION, SOLUTION SUBCUTANEOUS
Qty: 1 EACH | Refills: 11 | Status: SHIPPED | OUTPATIENT
Start: 2021-10-20 | End: 2022-01-06 | Stop reason: SDUPTHER

## 2021-10-20 NOTE — PROGRESS NOTES
[facility] [address] [facility] Update    Date: 10/20/21    Jeremías Robles 1970    Medication: Taltz 80 mg/ml pen    Prior Authorization: through 10/20/2022    Prescription needs to be transferred to [facility] (phone: [phone]). [facility] ([phone]) was notified that this specialty prescription needs to be transferred to the insurance mandated specialty pharmacy above.      Divya Kwan, [address] at [facility], [address] [place], [address]  phone: [phone]   fax: [phone]

## 2021-11-08 ENCOUNTER — OFFICE VISIT (OUTPATIENT)
Dept: FAMILY MEDICINE CLINIC | Age: 51
End: 2021-11-08
Payer: COMMERCIAL

## 2021-11-08 VITALS
DIASTOLIC BLOOD PRESSURE: 80 MMHG | TEMPERATURE: 98.6 F | HEART RATE: 84 BPM | HEIGHT: 69 IN | BODY MASS INDEX: 32.41 KG/M2 | SYSTOLIC BLOOD PRESSURE: 118 MMHG | OXYGEN SATURATION: 96 % | WEIGHT: 218.8 LBS | RESPIRATION RATE: 18 BRPM

## 2021-11-08 DIAGNOSIS — E11.9 TYPE 2 DIABETES MELLITUS WITHOUT COMPLICATION, WITHOUT LONG-TERM CURRENT USE OF INSULIN (HCC): ICD-10-CM

## 2021-11-08 DIAGNOSIS — R55 NEAR SYNCOPE: Primary | ICD-10-CM

## 2021-11-08 PROCEDURE — 99213 OFFICE O/P EST LOW 20 MIN: CPT | Performed by: NURSE PRACTITIONER

## 2021-11-08 PROCEDURE — 3051F HG A1C>EQUAL 7.0%<8.0%: CPT | Performed by: NURSE PRACTITIONER

## 2021-11-08 RX ORDER — INSULIN PUMP SYRINGE, 3 ML
EACH MISCELLANEOUS
Qty: 1 KIT | Refills: 0 | Status: SHIPPED | OUTPATIENT
Start: 2021-11-08 | End: 2022-03-07

## 2021-11-08 NOTE — PROGRESS NOTES
[facility] Note     Hany Byrd (: 1970) is a 49 y.o. male, established patient, here for evaluation of the following chief complaint(s):  Sweats (at night), Other (balance issues), and Finger Pain       ASSESSMENT/PLAN:  1. Near syncope  -     REFERRAL TO CARDIOLOGY of holter monitoring &  R/O cardiac causes  - Labs and ER chart reviewed  - Orthostatic BP negative  - Patient unable to stay for rest of encounter due to a meeting. 2. Type 2 diabetes mellitus without complication, without long-term current use of insulin (HCC)  -     Blood-Glucose Meter monitoring kit; Use to check blood sugar daily. Dx E11.9. Fill per insurance formulary preference., Normal, Disp-1 Kit, R-0  - Begin check glucose fasting, after largest meal of day and PRN symptoms. Log sheet provided for patient. He will begin to log and provide readings to our office for review      Return in about 1 month (around 2021). SUBJECTIVE/OBJECTIVE:    Hany Byrd is a 49 y.o. male seen to day for DM, sweats and near syncope. Mr. Romario Mendoza shares that he has been experiencing episodes of sudden sweating and feel weak, that he will pass out. During these episodes he has not checked his blood sugar as his machine is not working. There has been no CP or feeling SOB. Cardiovascular Review:  He has no known cardiovascular conditions. Diet and Lifestyle: generally follows a low fat low cholesterol diet, smoker daily  Home BP Monitoring: is not measured at home. Pertinent ROS: no medication side effects noted, no TIA's, no chest pain on exertion, no dyspnea on exertion, no swelling of ankles. Diabetes Mellitus:  Diabetic ROS - medication compliance: compliant all of the time, diabetic diet compliance: compliant most of the time, home glucose monitoring: is not performed, home meter is broken.   Further diabetic ROS: no polyuria or polydipsia, no chest pain, dyspnea or TIA's, no numbness, tingling or pain in extremities. Review of Systems   Constitutional: Positive for diaphoresis. Negative for activity change, appetite change, chills and fever. HENT: Negative. Eyes: Negative. Respiratory: Negative. Gastrointestinal: Negative. Endocrine: Negative. Genitourinary: Negative. Musculoskeletal: Negative. Neurological: Positive for light-headedness (with sweating, feels like passing out). Negative for dizziness, syncope, facial asymmetry, speech difficulty, numbness and headaches. Psychiatric/Behavioral: Negative. General appearance - Alert, NAD. Head: Atraumatic. Normocephalic. No lymphadenopathy  Eyes: EOMI. PERRL, Sclera anicteric. Ears: Hearing grossly normal.    Nose: Nares patent, no polyps  Throat: pharynx clear, no exudates. Respiratory - LCTAB. No wheeze/rale/rhonchi  Heart - Normal rate, regular rhythm. No M/R/G  Abdomen - Soft, non tender. Non distended. No organomegaly  Neurological - No focal deficits. Speech normal.   Musculoskeletal - Normal ROM, Gait normal.  Patellar reflex intact. Extremities - No LE edema. Distal pulses intact  Skin - normal coloration and normal turgor. No cyanosis, no rash. On this date 11/08/2021 I have spent 22 minutes reviewing previous notes, test results and face to face with the patient discussing the diagnosis and importance of compliance with the treatment plan as well as documenting on the day of the visit. An electronic signature was used to authenticate this note.   -- Mars Blank NP

## 2021-11-08 NOTE — PATIENT INSTRUCTIONS
Fainting: Care Instructions  Your Care Instructions     When you faint, or pass out, you lose consciousness for a short time. A brief drop in blood flow to the brain often causes it. When you fall or lie down, more blood flows to your brain and you regain consciousness. Emotional stress, pain, or overheating--especially if you have been standing--can make you faint. In these cases, fainting is usually not serious. But fainting can be a sign of a more serious problem. Your doctor may want you to have more tests to rule out other causes. The treatment you need depends on the reason why you fainted. The doctor has checked you carefully, but problems can develop later. If you notice any problems or new symptoms, get medical treatment right away. Follow-up care is a key part of your treatment and safety. Be sure to make and go to all appointments, and call your doctor if you are having problems. It's also a good idea to know your test results and keep a list of the medicines you take. How can you care for yourself at home? · Drink plenty of fluids to prevent dehydration. If you have kidney, heart, or liver disease and have to limit fluids, talk with your doctor before you increase your fluid intake. When should you call for help? Call 911 anytime you think you may need emergency care. For example, call if:    · You have symptoms of a heart problem. These may include:  ? Chest pain or pressure. ? Severe trouble breathing. ? A fast or irregular heartbeat. ? Lightheadedness or sudden weakness. ? Coughing up pink, foamy mucus. ? Passing out. After you call 911, the  may tell you to chew 1 adult-strength or 2 to 4 low-dose aspirin. Wait for an ambulance. Do not try to drive yourself.     · You have symptoms of a stroke. These may include:  ? Sudden numbness, tingling, weakness, or loss of movement in your face, arm, or leg, especially on only one side of your body.   ? Sudden vision changes. ? Sudden trouble speaking. ? Sudden confusion or trouble understanding simple statements. ? Sudden problems with walking or balance. ? A sudden, severe headache that is different from past headaches.     · You passed out (lost consciousness) again. Watch closely for changes in your health, and be sure to contact your doctor if:    · You do not get better as expected. Where can you learn more? Go to http://www.gray.com/  Enter A848 in the search box to learn more about \"Fainting: Care Instructions. \"  Current as of: July 1, 2021               Content Version: 13.0  © 1526-0384 [employer]. Care instructions adapted under license by [employer] (which disclaims liability or warranty for this information). If you have questions about a medical condition or this instruction, always ask your healthcare professional. [address] any warranty or liability for your use of this information.

## 2021-11-08 NOTE — PROGRESS NOTES
Chief Complaint   Patient presents with    Sweats     at night    Other     balance issues    Finger Pain     Patient states his blood sugar meter is broken. Visit Vitals  /80 (BP 1 Location: Left arm, BP Patient Position: Sitting, BP Cuff Size: Small adult)   Pulse 84   Temp 98.6 °F (37 °C) (Oral)   Resp 18   Ht 5' 9\" (1.753 m)   Wt 218 lb 12.8 oz (99.2 kg)   SpO2 96%   BMI 32.31 kg/m²     1. Have you been to the ER, urgent care clinic since your last visit? Hospitalized since your last visit? 10/12/21    2. Have you seen or consulted any other health care providers outside of the [address] [place] [address] since your last visit? Include any pap smears or colon screening.  no

## 2021-11-23 ENCOUNTER — TELEPHONE (OUTPATIENT)
Dept: FAMILY MEDICINE CLINIC | Age: 51
End: 2021-11-23

## 2021-11-23 NOTE — TELEPHONE ENCOUNTER
Called, spoke to pt. Two pt identifiers confirmed. Patient c/o chronic right hand pain   that has been going on and now causing him not to sleep. Patient has also ask Rheumatology to change his med but have not heard from them at this time. Patient stated this has been going on x 1 week. He also states that it hurts from shoulder to hand and middle finger is very swollen and painful. He has been to ER for this and have had an x-ray.  Please Advise

## 2021-11-23 NOTE — TELEPHONE ENCOUNTER
----- Message from Ynes Cee sent at 11/22/2021  9:18 AM EST -----  Subject: Message to Provider    QUESTIONS  Information for Provider? Patient called in for monitor for sugar levels. Patient said at last visit he was prescribed a new monitor but when he   went to the pharmacy they said he did not have a prescription for a new   one. So he needs the prescription. Patient old one is no longer working   ---------------------------------------------------------------------------  --------------  [address]  What is the best way for the office to contact you? OK to leave message on   voicemail  Preferred Call Back Phone Number? [phone]  ---------------------------------------------------------------------------  --------------  SCRIPT ANSWERS  Relationship to Patient?  Self

## 2021-11-23 NOTE — TELEPHONE ENCOUNTER
Called pharmacy. Pharmacy rep said they sent out a prior auth for it. Called patient. Two patient identifiers verified. Told pt we were working on the prior auth for his glucose meter and he would be called once it is ready for him.

## 2021-11-24 NOTE — TELEPHONE ENCOUNTER
Called, spoke to pt. Two pt identifiers confirmed. Writer informed patient on NP recommendation. Pt verbalized understanding of information discussed w/ no further questions at this time.

## 2021-12-01 ENCOUNTER — TELEPHONE (OUTPATIENT)
Dept: FAMILY MEDICINE CLINIC | Age: 51
End: 2021-12-01

## 2021-12-01 ENCOUNTER — OFFICE VISIT (OUTPATIENT)
Dept: RHEUMATOLOGY | Age: 51
End: 2021-12-01
Payer: COMMERCIAL

## 2021-12-01 VITALS
HEART RATE: 87 BPM | SYSTOLIC BLOOD PRESSURE: 104 MMHG | RESPIRATION RATE: 18 BRPM | TEMPERATURE: 98.8 F | BODY MASS INDEX: 32.29 KG/M2 | WEIGHT: 218 LBS | HEIGHT: 69 IN | DIASTOLIC BLOOD PRESSURE: 75 MMHG

## 2021-12-01 DIAGNOSIS — L40.50 PSORIATIC ARTHRITIS (HCC): ICD-10-CM

## 2021-12-01 DIAGNOSIS — Z79.60 LONG-TERM USE OF IMMUNOSUPPRESSANT MEDICATION: ICD-10-CM

## 2021-12-01 PROCEDURE — 99215 OFFICE O/P EST HI 40 MIN: CPT | Performed by: INTERNAL MEDICINE

## 2021-12-01 RX ORDER — FOLIC ACID 1 MG/1
1 TABLET ORAL DAILY
Qty: 90 TABLET | Refills: 5 | Status: SHIPPED | OUTPATIENT
Start: 2021-12-01 | End: 2022-03-09 | Stop reason: SDUPTHER

## 2021-12-01 RX ORDER — PREDNISONE 20 MG/1
20 TABLET ORAL
Qty: 10 TABLET | Refills: 0 | Status: SHIPPED | OUTPATIENT
Start: 2021-12-01 | End: 2022-03-07

## 2021-12-01 RX ORDER — METHOTREXATE 25 MG/ML
25 INJECTION, SOLUTION INTRA-ARTERIAL; INTRAMUSCULAR; INTRAVENOUS
Qty: 12 ML | Refills: 1 | Status: SHIPPED | OUTPATIENT
Start: 2021-12-03 | End: 2022-08-10 | Stop reason: SDUPTHER

## 2021-12-01 NOTE — PROGRESS NOTES
REASON FOR VISIT    This is a follow-up visit for Mr. Wilman Moreno for     ICD-10-CM   1. [facility] ([facility] L40.50   2. Psoriasis L40.9     The patient has consented for synchronous (real-time) Telemedicine (audio-video technology) on 12/1/2021 for their care to be delivered over telemedicine in place of their regularly scheduled office visit pursuant to the emergency declaration under the [address] [place] [address] waiver authority and the [employer] Act, this Virtual  Visit was conducted, with patient's consent, to reduce the patient's risk of exposure to COVID-19 and provide continuity of care for an established patient. Services were provided through a video synchronous discussion virtually to substitute for in-person clinic visit. Spondyloarthritis phenotype includes:  Anti-CCP positive: no  Rheumatoid factor positive: no  HLA-B27 positive: no  Inflammatory Back Pain: no  Erosive disease: yes  Sacroiliitis: no  Ankylosis: no  Psoriasis: yes  Enthesitis: no  Dactylitis: no  Nail Pitting: no  Onycholysis: no  Splinter Hemorrhage: no  Extra-articular manifestations include: none  SAPHO: no    Immunosuppression Screening (10/14/2021):   Quantiferon TB: negative  PPD:  Not performed  Hepatitis B: negative  Hepatitis C: negative    Therapy History includes:  Current NSAIDs include: Celebrex 200 mg twice daily  Current DMARD include: methotrexate 25 mg every Friday (6/24/2020 to 7/2021; 10/14/2021 to present), Taltz 80 mg every 4 weeks (9/17/2020 to 7/2021; 10/2021 to present)  Prior DMARD therapy includes: sulfasalazine 1000 mg twice daily (6/27/2012 to 8/13/2014; 12/02/2014 to 500 mg twice daily;10/02/2014 to 12/02/2014), methotrexate 20 mg every Saturday (8/13/2014 to 5/27/2015; insurance coverage, 6/28/2019 to 7/28/2019; did not refill, 8/29/2019 to 6/24/2020),  Enbrel, Humira, Remicade, Otezla 30 mg twice daily (5/2015 to 6/2018), Stelara, Cosentyx 300 mg every 30 days (6/2018 to 9/2019; loss of patient assistance, 1/03/2020 to 9/16/2020)  The following DMARDs have been ineffective: sulfasalazine, methotrexate PO, Otezla, Enbrel, Humira, Remicade, Stelara, Cosentyx   The following DMARDs were stopped because of side effects: none    HISTORY OF PRESENT ILLNESS    Mr. Corine Singleton returns for a follow-up visit. On his last visit, I asked him to resume methotrexate 25 mg SubQ every Saturday and Jesika Fran, which he has taken good tolerance. He was off treatment for more than 3 months. Today, she has pain and swelling in his right 3rd PIP and left IP. He has been having right shoulder pain and is not sleeping well. He is smoking 5 to 10 cigarettes. Most recent toxicity monitoring by blood work was done on 10/12/2021 and did not reveal any significant adverse effects, except . I reviewed the patient's interval medical history, surgical history, medications, and allergies. The patient has not had any interval hospital admissions, infections, or surgeries. REVIEW OF SYSTEMS    A comprehensive review of systems was performed and pertinent results are documented in the HPI, review of systems is otherwise non-contributory. PAST MEDICAL HISTORY    He has a past medical history of Arthritis, Diabetes ([facility] (5/27/2009), Elevated liver enzymes, Hypercholesterolemia, Psoriasis (5/27/2009), and Psoriatic arthritis ([facility] (5/27/2009). FAMILY HISTORY    His family history includes Asthma in his sister; Diabetes in his father and paternal grandmother; No Known Problems in his brother and mother. SOCIAL HISTORY    He reports that he has been smoking cigarettes. He has a 10.00 pack-year smoking history. He has never used smokeless tobacco. He reports that he does not drink alcohol and does not use drugs.     MEDICATIONS    Current Outpatient Medications   Medication Sig    [START ON 12/3/2021] methotrexate 25 mg/mL chemo injection 1 mL by SubCUTAneous route Every Friday.  folic acid (FOLVITE) 1 mg tablet Take 1 Tablet by mouth daily.  predniSONE (DELTASONE) 20 mg tablet Take 20 mg by mouth daily (with breakfast).  Blood-Glucose Meter monitoring kit Use to check blood sugar daily. Dx E11.9. Fill per insurance formulary preference.  ixekizumab (Taltz Autoinjector) 80 mg/mL auto injector 1 mL by SubCUTAneous route every thirty (30) days.  famotidine (PEPCID) 40 mg tablet TAKE 1 TABLET BY MOUTH EVERY DAY AT NIGHT    BD Insulin Syringe Ultra-Fine 1 mL 31 gauge x 5/16 syrg 1 EACH BY DOES NOT APPLY ROUTE EVERY SATURDAY FOR 12 DOSES. TO BE USED FOR METHOTREXATE SOLUTION    glimepiride (AMARYL) 4 mg tablet Take 1 Tab by mouth every morning.  metFORMIN ER (GLUCOPHAGE XR) 500 mg tablet Take 1 Tab by mouth two (2) times daily (with meals).  Jardiance 10 mg tablet Take 1 Tab by mouth daily.  atorvastatin (LIPITOR) 40 mg tablet Take 1 Tab by mouth daily.  celecoxib (CELEBREX) 200 mg capsule TAKE 1 CAPSULE BY MOUTH TWICE A DAY AS NEEDED FOR PAIN WITH FOOD    acetaminophen (TYLENOL) 500 mg tablet TAKE 1 TO 2 TABLETS BY MOUTH 3 TIMES A DAY AS NEEDED FOR PAIN    glucose blood VI test strips (BLOOD GLUCOSE TEST) strip Use to check blood sugar daily. Dx E11.9. Pharmacist to choose based on insurance/glucose monitoring kit type    lancets misc Use to check blood sugar daily. Dx E11.9. Fill per insurance formulary preference.  glucose blood VI test strips (ASCENSIA AUTODISC VI, ONE TOUCH ULTRA TEST VI) strip Twice a day    gabapentin (Neurontin) 300 mg capsule Take 1 Capsule by mouth three (3) times daily. Max Daily Amount: 900 mg. (Patient not taking: Reported on 12/1/2021)     No current facility-administered medications for this visit.        ALLERGIES    Allergies   Allergen Reactions    Iodinated Contrast Media Shortness of Breath       PHYSICAL EXAMINATION    Visit Vitals  /75   Pulse 87   Temp 98.8 °F (37.1 °C)   Resp 18   Ht 5' 9\" (1.753 m)   Wt 218 lb (98.9 kg)   BMI 32.19 kg/m²     Body mass index is 32.19 kg/m². General: Patient is alert, oriented x 3, not in acute distress    HEENT:   Sclerae are not injected and appear moist.  There is no alopecia. Chest:  Breathing comfortably at room air    Skin:    Exam deferred  Previous exam    Psoriasis:     no (large patch lateral posterior right Achilles; RESOLVED)  Nail Pitting:     no  Onycholysis:     Yes (LEFT: 1st, 3rd, 5th, RIGHT: 4th)  Splinter Hemorrhage:   Yes (LEFT: 5th)  Palmoplantar pustulosis:   no  Acne fulminans:    no  Acne conglobata:    no  Hidradenitis Suppurativa:   no  Dissecting cellulitis of the scalp:  no  Pilonidal sinus:    no  Erythema nodosum:    no    Musculoskeletal:  A comprehensive musculoskeletal exam was performed for all joints of each upper and lower extremity and assessed for swelling, tenderness and range of motion.       Right elbow flexion deformity  Bilateral knee crepitus without effusion    Costochondritis:  no   Synovitis:   yes (see table: DIP LEFT: 2nd 3rd, 4th; RIGHT: 3rd)  Dactylitis:   no  Enthesitis:   no    Joint Count 12/1/2021 12/17/2020 9/16/2020 6/24/2020 1/3/2020 5/29/2019   Left elbow - Tender - - 1 - - -   Left elbow - Swollen - - 0 - - -   Left wrist- Tender - - - 1 1 -   Left wrist- Swollen - - - 0 1 -   Left 5th MCP - Tender - - - 1 - -   Left 5th MCP - Swollen - - - 0 - -   Left thumb IP - Tender 1 - - 1 - 1   Left thumb IP - Swollen 0 - - 1 - 0   Left 2nd PIP - Tender - - - - - 1   Left 2nd PIP - Swollen - - - - - 0   Left 3rd PIP - Tender - - 1 - - -   Left 3rd PIP - Swollen - - 1 - - -   Left 4th PIP - Tender - - - - - 1   Left 4th PIP - Swollen - - - - - 0   Left 5th PIP - Tender - - - - - 1   Left 5th PIP - Swollen - - - - - 0   Right elbow - Tender - 1 1 1 1 1   Right elbow - Swollen - 1 1 1 1 1   Right 1st MCP - Tender - 0 - - - -   Right 1st MCP - Swollen - 1 - - - -   Right thumb IP - Tender - - - 1 - -   Right thumb IP - Swollen - - - 0 - -   Right 3rd PIP - Tender 1 1 1 1 - -   Right 3rd PIP - Swollen 1 1 1 0 - -   Tender Joint Count (Total) 2 2 4 6 2 5   Swollen Joint Count (Total) 1 3 3 2 2 1     DATA REVIEW    Laboratory     Recent laboratory results were reviewed, summarized, and discussed with the patient. Imaging    Musculoskeletal Ultrasound    None    Radiographs    Bilateral Hand 10/12/2021: LEFT: no fracture, dislocation or other acute osseous or articular abnormality. The soft tissues are within normal limits. Joint space narrowing and erosive changes are noted predominantly in the third and fourth PIP joints, progressive compared to the prior exam. RIGHT: no fracture or other acute  osseous or articular abnormality. The soft tissues are within normal limits. Mild joint space narrowing is now noted in the DIP and PIP joints of the second through fourth digits. Bilateral Hand 5/29/2019: LEFT: No fracture or dislocation on plain film. No joint space narrowing. No joint space erosion or periosteal reaction. Alignment is within normal limits. Bone mineralization is decreased. No soft tissue calcification. RIGHT: No fracture or dislocation on plain film. No joint space narrowing. There is no joint space erosion. There is subtle periostitis distal phalanx of the middle finger. Alignment is within normal limits. Bone mineralization is decreased. No soft tissue calcification. Chest 3/30/2019: Cardiac monitoring wires overlie the thorax. The cardiomediastinal and hilar contours are within normal limits. The pulmonary vasculature is within normal limits. The lungs and pleural spaces are clear. The visualized bones and upper abdomen are age-appropriate. Right Elbow 12/15/2014: a moderate right elbow effusion.  A nondisplaced fracture of the radial head is suspected. No dislocation is shown. CT Imaging    CT Head without contrast 9/10/2010: normal ventricles and basal cisterns. No intracranial masses or hemorrhages are seen. No focal intraparenchymal low density abnormalities are seen. MR Imaging    MRI Right Elbow without contrast 12/22/2014: there is a large complex appearing elbow joint effusion. There is  diffuse chondral thinning. Mild diffuse osseous edema is shown. The elbow collateral ligaments are intact. No tendon derangement is demonstrated. No soft tissue mass is shown. DXA     None    PROCEDURE     Right Elbow Kenalog 40 mg IA. . (12/17/2020)     ASSESSMENT AND PLAN    This is a follow-up visit for Mr. Milton Jonas. 1) Psoriatic Arthritis. (psoriasis, arthritis) He was maintained on methotrexate 25 mg SubQ every Friday and Tatlz 80 mg monthly, with good tolerance but was off treatment for at least 3 months while in [facility]. He resumed treatment in 10/2021 and has active synovitis but not as severe as before. I explained that he needs to give treatment more time since he was off. I will give prednisone 20 mg daily for 10 days. I refilled methotrexate. 2) Long Term Use of Immunosuppressants. The patient remains on high risk immunomodulatory medications (methotrexate, Taltz) and requires frequent toxicity monitoring by blood work to evaluate for toxicities. Respective labs were ordered (see below orders for details). 3) Bilateral Knee Osteoarthritis. I had referred him to physical therapy. This was not an active issue today. 4) Long Term Use of NSAIDs. He is on Celebrex. 5) Tobacco Use. He is smoking 5 to 10 cigarettes. 6) Cervical Radiculitis. He was following with [facility]. I asked him to folow up with Dr. Alecia Cabrera. 7) Poor Sleep. I asked him to discuss with her PCP. The patient voiced understanding of the aforementioned assessment and plan.      A total of 44 minutes was spent on this visit, reviewing interval notes, interval testing results, ordering tests, refilling medications, documenting the findings in the note, patient education, counseling, and coordination of care as described above. All questions asked and answered.     TODAY'S ORDERS    Orders Placed This Encounter    methotrexate 25 mg/mL chemo injection    folic acid (FOLVITE) 1 mg tablet    predniSONE (DELTASONE) 20 mg tablet     Future Appointments   Date Time Provider Gale Daniella   12/6/2021  1:40 PM MD HUSSEIN Stover BS AMB   12/16/2021  8:45 AM La Santos NP PAFP BS AMB   1/18/2022  1:20 PM Su Bermudez MD AOCR BS AMB     Meche Paniagua MD, [address]    Adult Rheumatology   Rheumatology Ultrasound Certified  [facility]  [facility], [address] [place] [address]   Phone [phone]  Fax [phone]

## 2021-12-01 NOTE — TELEPHONE ENCOUNTER
Called, spoke to pt. Two pt identifiers confirmed. Writer informed  patient that he has the soonest appointment with PCP. Patient is requesting sleep med. Patient is request a med, but Edi Waldron has informed him he must be seen. Pt verbalized understanding of information discussed w/ no further questions at this time.

## 2021-12-01 NOTE — TELEPHONE ENCOUNTER
Pt is having trouble sleeping gave pt first available appointment with provider since his provider is gone. Has appt on 12/16/21 with new provider please advise pt on what to do.

## 2021-12-01 NOTE — LETTER
12/1/2021    Patient: Justino Ballard   YOB: 1970   Date of Visit: 12/1/2021     Virgil Prince MD  [facility] [address] [id number]  Via Fax: [phone]    Dear Virgil Prince MD,      Thank you for referring Mr. Chance Mares to [address] [place] [address] for evaluation. My notes for this consultation are attached. If you have questions, please do not hesitate to call me. I look forward to following your patient along with you.       Sincerely,    Williams Almeida MD

## 2021-12-06 ENCOUNTER — TELEPHONE (OUTPATIENT)
Dept: CARDIOLOGY CLINIC | Age: 51
End: 2021-12-06

## 2021-12-06 ENCOUNTER — OFFICE VISIT (OUTPATIENT)
Dept: CARDIOLOGY CLINIC | Age: 51
End: 2021-12-06

## 2021-12-06 VITALS
DIASTOLIC BLOOD PRESSURE: 70 MMHG | OXYGEN SATURATION: 95 % | SYSTOLIC BLOOD PRESSURE: 110 MMHG | RESPIRATION RATE: 16 BRPM | HEIGHT: 69 IN | HEART RATE: 80 BPM | WEIGHT: 220 LBS | BODY MASS INDEX: 32.58 KG/M2

## 2021-12-06 DIAGNOSIS — T67.1XXA HEAT SYNCOPE, INITIAL ENCOUNTER: Primary | ICD-10-CM

## 2021-12-06 DIAGNOSIS — E11.9 TYPE 2 DIABETES MELLITUS WITHOUT COMPLICATION, WITHOUT LONG-TERM CURRENT USE OF INSULIN (HCC): ICD-10-CM

## 2021-12-06 DIAGNOSIS — R07.2 PRECORDIAL PAIN: ICD-10-CM

## 2021-12-06 DIAGNOSIS — E78.5 HYPERLIPIDEMIA, UNSPECIFIED HYPERLIPIDEMIA TYPE: ICD-10-CM

## 2021-12-06 DIAGNOSIS — L74.9 SWEATING ABNORMALITY: ICD-10-CM

## 2021-12-06 PROCEDURE — 99204 OFFICE O/P NEW MOD 45 MIN: CPT | Performed by: SPECIALIST

## 2021-12-06 RX ORDER — FLUDROCORTISONE ACETATE 0.1 MG/1
0.1 TABLET ORAL 2 TIMES DAILY
Qty: 60 TABLET | Refills: 2 | Status: SHIPPED | OUTPATIENT
Start: 2021-12-06 | End: 2022-03-07

## 2021-12-06 NOTE — PROGRESS NOTES
Vitals:    12/06/21 1333 12/06/21 1351 12/06/21 1354 12/06/21 1357   BP: 110/70      BP 2:  100/60 102/60 100/50   BP 1 Location:  Right arm Right arm Right arm   BP Patient Position:  Supine Sitting Standing   BP Cuff Size:  Adult Adult Adult   Pulse: 80      Pulse 2:  82 90 101   Resp: 16      Height: 5' 9\" (1.753 m)      Weight: 220 lb (99.8 kg)      SpO2: 95%      \

## 2021-12-06 NOTE — PROGRESS NOTES
Rl Rodrigues     1970       Esteban Ramos MD, [facility]  Date of Visit-12/6/2021   PCP is [facility]  [facility]  HPI:  Natty Gloria is a 49 y.o. male   Pt referred by PCP for near syncope. Orthostatics are done today sand show pulse rising from 80 to 101. Pt sees Rheum for psoriatic arthritis. Pt seen by PCP on 11/8/21, note is reviewed. Has had episodes of sweating, feeling weak, and passing out. Pt had a nuclear stress test in 2019 that was normal perfusion with EF 63%. Pt takes Jardiance. Today. .. Pt states that he has been sweating constantly, and that his arms look like somebody cut off circulation to his lower arms at night when sleeping. He notes that he has had some syncopal episodes, including an episode when he was getting blood taken. He describes the sweating as being wet for hours like he had just gotten out of the shower, including [facility] and [facility] was [facility]. He states that he has been experiencing some fatigue as well. He travels but denies known TB exposure. Denies chest pain, edema, syncope or shortness of breath at rest, has no tachycardia, palpitations or sense of arrhythmia. Smoker no alcohol declined family history review of systems   reports that he has been smoking cigarettes. He has a 10.00 pack-year smoking history. He has never used smokeless tobacco. He reports that he does not drink alcohol and does not use drugs. Assessment/Plan:     1. Heat syncope, initial encounter  Heat syncope, he gets a lot of sweating. Seems to get dehydrated and then pass out on many occasions. Will do an echo & loop monitor. Has gotten blood work which is reviewed. 2. Precordial pain  Has bilateral arm tightness, check stress nuke, multiple risk factors, including high lipids and DM. 3. Sweating abnormality  Unexplained, denies TB exposure.    [place], if negative workup then see ID.     4. Hyperlipidemia, unspecified hyperlipidemia type  At goal , denies excess muscle aches or new liver issues  Key Antihyperlipidemia Meds             atorvastatin (LIPITOR) 40 mg tablet (Taking) Take 1 Tab by mouth daily. Lab Results   Component Value Date/Time    LDL, calculated 165.8 (H) 03/31/2021 12:16 PM          5. Type 2 diabetes mellitus without complication, without long-term current use of insulin (HCC)  Lab Results   Component Value Date/Time    Hemoglobin A1c 7.6 (H) 03/31/2021 12:16 PM    Hemoglobin A1c (POC) 7.7 11/26/2019 04:00 PM     Risk factor for CV disease. Will call with results/3 month appointment  Patient Instructions   Please start florinef 0.1mg twice a day. You have been scheduled for an exercise nuclear stress test and an echocardiogram. A 7 day loop monitor has been ordered for you. This will be mailed to the address given to us in the next 5-7 business days. Please read over the instructions given to you about [employer] loop monitors. We will call with results and see you back in 3 months. Please arrive 15 minutes prior to your appointment. Wear comfortable clothing and walking or athletic shoes. Do not eat or drink anything, except water, for at least 4 hours prior to your appointment. Avoid tobacco products for at least 12 hours prior to your test.    Do not eat or drink anything containing caffeine, including but not limited to the following: chocolate, regular and decaffeinated coffee, soft drinks, or tea for at least 24 hours prior to your test.    Do not hold your scheduled medications prior to your test.    Your test will be performed on a 1 day protocol. This is determined by your height, weight, and other risk factors. For a 1 day test, please allow for 4 hours in the office that day. Impression:   1. Heat syncope, initial encounter    2. Precordial pain    3. Sweating abnormality    4.  Hyperlipidemia, unspecified hyperlipidemia type    5. Type 2 diabetes mellitus without complication, without long-term current use of insulin ([facility]       Cardiac History:   No specialty comments available. Future Appointments   Date Time Provider Gale Brewer   12/16/2021  8:45 AM Doc Felty, NP [facility] BS AMB   1/18/2022  1:20 PM Lynnette Bermudez MD [facility] BS AMB        ROS-except as noted above. . A complete cardiac and respiratory are reviewed and negative except as above ; Resp-denies wheezing  or productive cough,. Const- No unusual weight loss or fever; Neuro-no recent seizure or CVA ; GI- No BRBPR, abdom pain, bloating ; - no  hematuria   see supplement sheet, initialed and to be scanned by staff  Past Medical History:   Diagnosis Date    Arthritis     Diabetes ([facility] 5/27/2009    Elevated liver enzymes     resolved     Hypercholesterolemia     Psoriasis 5/27/2009    Psoriatic arthritis ([facility] 5/27/2009      Social Hx= reports that he has been smoking cigarettes. He has a 10.00 pack-year smoking history. He has never used smokeless tobacco. He reports that he does not drink alcohol and does not use drugs. Exam and Labs:  /70   Pulse 80   Resp 16   Ht 5' 9\" (1.753 m)   Wt 220 lb (99.8 kg)   SpO2 95%   BMI 32.49 kg/m² Constitutional:  NAD, comfortable  Head: NC,AT. Eyes: No scleral icterus. Neck:  Neck supple. No JVD present. Throat: moist mucous membranes. Chest: Effort normal & normal respiratory excursion . Neurological: alert, conversant and oriented . Skin: Skin is not cold. No obvious systemic rash noted. Not diaphoretic. No erythema. Psychiatric:  Grossly normal mood and affect. Behavior appears normal. Extremities:  no clubbing or cyanosis. Abdomen: non distended    Lungs:breath sounds normal. No stridor. distress, wheezes or  Rales. Heart: normal rate, regular rhythm, normal S1, S2, no murmurs, rubs, clicks or gallops , PMI non displaced. Edema: Edema is none.   Lab Results   Component Value Date/Time    Cholesterol, total 252 (H) 03/31/2021 12:16 PM    HDL Cholesterol 44 03/31/2021 12:16 PM    LDL, calculated 165.8 (H) 03/31/2021 12:16 PM    Triglyceride 211 (H) 03/31/2021 12:16 PM    CHOL/HDL Ratio 5.7 (H) 03/31/2021 12:16 PM     Lab Results   Component Value Date/Time    Sodium 136 10/12/2021 08:40 AM    Potassium 3.9 10/12/2021 08:40 AM    Chloride 105 10/12/2021 08:40 AM    CO2 24 10/12/2021 08:40 AM    Anion gap 7 10/12/2021 08:40 AM    Glucose 170 (H) 10/12/2021 08:40 AM    BUN 16 10/12/2021 08:40 AM    Creatinine 0.90 10/12/2021 08:40 AM    BUN/Creatinine ratio 18 10/12/2021 08:40 AM    GFR est AA >60 10/12/2021 08:40 AM    GFR est non-AA >60 10/12/2021 08:40 AM    Calcium 9.4 10/12/2021 08:40 AM      Wt Readings from Last 3 Encounters:   12/06/21 220 lb (99.8 kg)   12/01/21 218 lb (98.9 kg)   11/08/21 218 lb 12.8 oz (99.2 kg)      BP Readings from Last 3 Encounters:   12/06/21 110/70   12/01/21 104/75   11/08/21 118/80      Current Outpatient Medications   Medication Sig    methotrexate 25 mg/mL chemo injection 1 mL by SubCUTAneous route Every Friday.  folic acid (FOLVITE) 1 mg tablet Take 1 Tablet by mouth daily.  predniSONE (DELTASONE) 20 mg tablet Take 20 mg by mouth daily (with breakfast).  Blood-Glucose Meter monitoring kit Use to check blood sugar daily. Dx E11.9. Fill per insurance formulary preference.  ixekizumab (Taltz Autoinjector) 80 mg/mL auto injector 1 mL by SubCUTAneous route every thirty (30) days.  famotidine (PEPCID) 40 mg tablet TAKE 1 TABLET BY MOUTH EVERY DAY AT NIGHT    BD Insulin Syringe Ultra-Fine 1 mL 31 gauge x 5/16 syrg 1 EACH BY DOES NOT APPLY ROUTE EVERY SATURDAY FOR 12 DOSES. TO BE USED FOR METHOTREXATE SOLUTION    glimepiride (AMARYL) 4 mg tablet Take 1 Tab by mouth every morning.  metFORMIN ER (GLUCOPHAGE XR) 500 mg tablet Take 1 Tab by mouth two (2) times daily (with meals).  Jardiance 10 mg tablet Take 1 Tab by mouth daily.     atorvastatin (LIPITOR) 40 mg tablet Take 1 Tab by mouth daily.  celecoxib (CELEBREX) 200 mg capsule TAKE 1 CAPSULE BY MOUTH TWICE A DAY AS NEEDED FOR PAIN WITH FOOD    acetaminophen (TYLENOL) 500 mg tablet TAKE 1 TO 2 TABLETS BY MOUTH 3 TIMES A DAY AS NEEDED FOR PAIN    glucose blood VI test strips (BLOOD GLUCOSE TEST) strip Use to check blood sugar daily. Dx E11.9. Pharmacist to choose based on insurance/glucose monitoring kit type    lancets misc Use to check blood sugar daily. Dx E11.9. Fill per insurance formulary preference.  glucose blood VI test strips (ASCENSIA AUTODISC VI, ONE TOUCH ULTRA TEST VI) strip Twice a day     No current facility-administered medications for this visit. Impression see above.       Written by Willy Lanza, as dictated by Nancy Russo MD.

## 2021-12-06 NOTE — Clinical Note
12/6/2021    Patient: Brenda Frausto   YOB: 1970   Date of Visit: 12/6/2021     Celina Cruz NP  [address]    Dear Celina Cruz NP,      Thank you for referring Mr. Staci Braden to [address] [place] [address] for evaluation. My notes for this consultation are attached. If you have questions, please do not hesitate to call me. I look forward to following your patient along with you.       Sincerely,    Gabrielle Holt MD

## 2021-12-06 NOTE — PATIENT INSTRUCTIONS
Please start florinef 0.1mg twice a day. You have been scheduled for an exercise nuclear stress test and an echocardiogram. A 7 day loop monitor has been ordered for you. This will be mailed to the address given to us in the next 5-7 business days. Please read over the instructions given to you about [employer] loop monitors. We will call with results and see you back in 3 months. Please arrive 15 minutes prior to your appointment. Wear comfortable clothing and walking or athletic shoes. Do not eat or drink anything, except water, for at least 4 hours prior to your appointment. Avoid tobacco products for at least 12 hours prior to your test.    Do not eat or drink anything containing caffeine, including but not limited to the following: chocolate, regular and decaffeinated coffee, soft drinks, or tea for at least 24 hours prior to your test.    Do not hold your scheduled medications prior to your test.    Your test will be performed on a 1 day protocol. This is determined by your height, weight, and other risk factors. For a 1 day test, please allow for 4 hours in the office that day.

## 2021-12-06 NOTE — TELEPHONE ENCOUNTER
----- Message from Radha Desai RN sent at 12/6/2021  2:23 PM EST -----  Can you order this gentleman a 7 day loop for near syncope?  Thanks

## 2021-12-07 NOTE — TELEPHONE ENCOUNTER
Called, spoke to pt. Two pt identifiers confirmed. Writer informed patient to try over the counter Melatonin 3 mg or 5 mg at HS. Pt verbalized understanding of information discussed w/ no further questions at this time.

## 2021-12-16 ENCOUNTER — OFFICE VISIT (OUTPATIENT)
Dept: FAMILY MEDICINE CLINIC | Age: 51
End: 2021-12-16
Payer: COMMERCIAL

## 2021-12-16 VITALS
DIASTOLIC BLOOD PRESSURE: 74 MMHG | OXYGEN SATURATION: 96 % | BODY MASS INDEX: 32.23 KG/M2 | HEART RATE: 84 BPM | WEIGHT: 217.6 LBS | TEMPERATURE: 97.3 F | SYSTOLIC BLOOD PRESSURE: 109 MMHG | RESPIRATION RATE: 16 BRPM | HEIGHT: 69 IN

## 2021-12-16 DIAGNOSIS — E11.9 TYPE 2 DIABETES MELLITUS WITHOUT COMPLICATION, WITHOUT LONG-TERM CURRENT USE OF INSULIN (HCC): Primary | ICD-10-CM

## 2021-12-16 DIAGNOSIS — Z23 NEEDS FLU SHOT: ICD-10-CM

## 2021-12-16 DIAGNOSIS — G47.9 SLEEP DISTURBANCE: ICD-10-CM

## 2021-12-16 LAB — HBA1C MFR BLD HPLC: 8.1 %

## 2021-12-16 PROCEDURE — 3052F HG A1C>EQUAL 8.0%<EQUAL 9.0%: CPT | Performed by: NURSE PRACTITIONER

## 2021-12-16 PROCEDURE — 90686 IIV4 VACC NO PRSV 0.5 ML IM: CPT | Performed by: NURSE PRACTITIONER

## 2021-12-16 PROCEDURE — 99212 OFFICE O/P EST SF 10 MIN: CPT | Performed by: NURSE PRACTITIONER

## 2021-12-16 PROCEDURE — 83036 HEMOGLOBIN GLYCOSYLATED A1C: CPT | Performed by: NURSE PRACTITIONER

## 2021-12-16 RX ORDER — TRAZODONE HYDROCHLORIDE 50 MG/1
50 TABLET ORAL
Qty: 30 TABLET | Refills: 0 | Status: SHIPPED | OUTPATIENT
Start: 2021-12-16 | End: 2022-10-10

## 2021-12-16 NOTE — PATIENT INSTRUCTIONS
Insomnia: Care Instructions  Overview     Insomnia is the inability to sleep well. Insomnia may make it hard for you to get to sleep, stay asleep, or sleep as long as you need to. This can make you tired and grouchy during the day. It can also make you forgetful, less effective at work, and unhappy. Insomnia can be linked to many things. These include health problems, medicines, and stressful events. Treatment may include treating problems that may be linked with your insomnia. Treatment also includes behavior and lifestyle changes. This may include cognitive-behavioral therapy for insomnia (CBT-I). CBT-I uses different ways to help you change your thoughts and behaviors that may interfere with sleep. Your doctor can recommend specific things you can try. Examples include doing relaxation exercises, keeping regular bedtimes and wake times, limiting alcohol, and making healthy sleep habits. Some people decide to take medicine for a while to help with sleep. Follow-up care is a key part of your treatment and safety. Be sure to make and go to all appointments, and call your doctor if you are having problems. It's also a good idea to know your test results and keep a list of the medicines you take. How can you care for yourself at home? Cognitive-behavioral therapy for insomnia (CBT-I)  · If your doctor recommends CBT-I, follow your treatment plan. Your doctor will give you instructions that are unique for you. · Your plan will likely include a few things that you can try at home. For example:  ? Try meditation or other relaxation techniques before you go to bed. ? Go to bed at the same time every night, and wake up at the same time every morning. Do not take naps during the day. ? Do not stay in bed awake for too long.  If you can't fall asleep, or if you wake up in the middle of the night and can't get back to sleep within about 15 to 20 minutes, get out of bed and go to another room until you feel sleepy. ? If watching the clock makes you anxious, turn it facing away from you so you cannot see the time. ? If you worry when you lie down, start a worry book. Well before bedtime, write down your worries, and then set the book and your concerns aside. Healthy sleep habits  · If your doctor recommends it, try making healthy sleep habits. For example:  ? Keep your bedroom quiet, dark, and cool. ? Do not have drinks with caffeine, such as coffee or black tea, for 8 hours before bed. ? Do not smoke or use other types of tobacco near bedtime. Nicotine is a stimulant and can keep you awake. ? Avoid drinking alcohol late in the evening, because it can cause you to wake in the middle of the night. ? Do not eat a big meal close to bedtime. If you are hungry, eat a light snack. ? Do not drink a lot of water close to bedtime, because the need to urinate may wake you up during the night. ? Do not read, watch TV, or use your phone in bed. Use the bed only for sleeping and sex. Medicine  · Be safe with medicines. Take your medicines exactly as prescribed. Call your doctor if you think you are having a problem with your medicine. · Talk with your doctor before you try an over-the-counter medicine, herbal product, or supplement to try to improve your sleep. Your doctor can recommend how much to take and when to take it. Make sure your doctor knows all of the medicines, vitamins, herbal products, and supplements you take. · You will get more details on the specific medicines your doctor prescribes. When should you call for help? Watch closely for changes in your health, and be sure to contact your doctor if:    · Your efforts to improve your sleep do not work.     · Your insomnia gets worse.     · You have been feeling down, depressed, or hopeless or have lost interest in things that you usually enjoy. Where can you learn more?   Go to http://www.gray.com/  Enter P513 in the search box to learn more about \"Insomnia: Care Instructions. \"  Current as of: June 16, 2021               Content Version: 13.0  © 3527-0759 [employer]. Care instructions adapted under license by [employer] (which disclaims liability or warranty for this information). If you have questions about a medical condition or this instruction, always ask your healthcare professional. [address] any warranty or liability for your use of this information.

## 2021-12-16 NOTE — PROGRESS NOTES
[address] [facility] Note     Aries Tolbert (: 1970) is a 49 y.o. male, established patient, here for evaluation of the following chief complaint(s):  Medication Evaluation and Insomnia       ASSESSMENT/PLAN:  1. Type 2 diabetes mellitus without complication, without long-term current use of insulin (HCC)  -     AMB POC HEMOGLOBIN A1C  -Counseling provided on enhancing glucose management given the elevation in his A1c to 8.1.  -Increase Jardiance to 25 mg daily  -Diet and lifestyle modification encouraged for weight loss and chronic disease prevention/ management    2. Sleep disturbance   -We will trial trazodone 25 to 50 mg nightly as needed  -Sleep hygiene    3. Needs flu shot  -     INFLUENZA VIRUS VAC QUAD,SPLIT,PRESV FREE SYRINGE IM      Return in about 3 months (around 3/16/2022). SUBJECTIVE/OBJECTIVE:    Aries Tolbert is a 49 y.o. male seen today for DM and sleep disturbance. Cardiovascular Review:  He has diabetes and hyperlipidemia. Diet and Lifestyle: does not rigorously follow a diabetic diet, sedentary, smoker 3-5 cigarettes  Home BP Monitoring: is not measured at home. Pertinent ROS: taking medications as instructed, no medication side effects noted, no TIA's, no chest pain on exertion, no dyspnea on exertion, no swelling of ankles. Diabetes Mellitus:  Diabetic ROS - medication compliance: compliant all of the time, diabetic diet compliance: probably noncompliant though I cannot elicit that specific history, home glucose monitoring: is not performed. Further diabetic ROS: no polyuria or polydipsia, no chest pain, dyspnea or TIA's, no numbness, tingling or pain in extremities. Sleep - he is able to fall asleep but wakes up multiple times a night and unable to fall back asleep. Sometimes he awakens due pain in hands and shoulder. Tried OTC sleep medication but did not work. He is unable to recall name    Review of Systems   Constitutional: Negative. HENT: Negative. Eyes: Negative. Respiratory: Negative. Cardiovascular: Negative. Gastrointestinal: Negative. Genitourinary: Negative. Musculoskeletal: Positive for arthralgias, joint swelling and myalgias. Neurological: Negative. Psychiatric/Behavioral: Positive for sleep disturbance. Negative for self-injury and suicidal ideas. Wt Readings from Last 3 Encounters:   12/16/21 217 lb 9.6 oz (98.7 kg)   12/06/21 220 lb (99.8 kg)   12/01/21 218 lb (98.9 kg)     Temp Readings from Last 3 Encounters:   12/16/21 97.3 °F (36.3 °C) (Temporal)   12/01/21 98.8 °F (37.1 °C)   11/08/21 98.6 °F (37 °C) (Oral)     BP Readings from Last 3 Encounters:   12/16/21 109/74   12/06/21 110/70   12/01/21 104/75     Pulse Readings from Last 3 Encounters:   12/16/21 84   12/06/21 80   12/01/21 87           PHYSICAL EXAMINATION:       General: Alert, cooperative, no distress  Respiratory: Breathing comfortably, in no acute respiratory distress. Clear to auscultation bilaterally. Cardiovascular: Regular rate and rhythm, S1, S2 normal, no murmur, click, rub or gallop. Extremities: no edema. Abdomen: Soft, non-tender, not distended. Bowel sounds normal. No masses or organomegaly. MSK: Extremities normal appearing, atraumatic, no effusion. Gait steady and unassisted. Skin: Skin color, texture, turgor normal. No rashes or lesions on exposed skin. Neurologic: A/Ox3  Psychiatric: Normal affect. Mood euthymic. Thoughts logical. Speech volume and speed normal            Treatment risks/benefits/costs/interactions/alternatives discussed with patient. Advised patient to call back or return to office if symptoms worsen/change/persist. If patient cannot reach us or should anything more severe/urgent arise he/she should proceed directly to the nearest emergency department. Discussed expected course/resolution/complications of diagnosis in detail with patient.   Patient expressed understanding with the diagnosis and plan.     An electronic signature was used to authenticate this note.   -- Cristiane Licona, NP

## 2021-12-16 NOTE — PROGRESS NOTES
Identified pt with two pt identifiers(name and ). Reviewed record in preparation for visit and have obtained necessary documentation. Chief Complaint   Patient presents with    Medication Evaluation    Insomnia        Vitals:    21 0853   Weight: 217 lb 9.6 oz (98.7 kg)   Height: 5' 9\" (1.753 m)   PainSc:   8   PainLoc: Generalized       Health Maintenance Due   Topic    DTaP/Tdap/Td series (1 - Tdap)    Eye Exam Retinal or Dilated     Colorectal Cancer Screening Combo     Flu Vaccine (1)    Shingrix Vaccine Age 50> (1 of 2)    COVID-19 Vaccine (3 - Booster for Elise Peter series)       Coordination of Care Questionnaire:  :   1) Have you been to an emergency room, urgent care, or hospitalized since your last visit? If yes, where when, and reason for visit? no       2. Have seen or consulted any other health care provider since your last visit? If yes, where when, and reason for visit? NO    3. For patients over 45: Has the patient had a colonoscopy? No     If the patient is female:    4. For patients over 36: Has the patient had a mammogram? NA based on age or sex    11. For patients over 21: Has the patient had a pap smear? NA based on age or sex    Patient is accompanied by self I have received verbal consent from [address] to discuss any/all medical information while they are present in the room. [address] is a 49 y.o. male  who presents for flu  immunizations. he denies any symptoms , reactions or allergies that would exclude them from being immunized today. Risks and adverse reactions were discussed and the VIS was given to them. All questions were addressed. he was observed for 10 min post injection. There were no reactions observed.     Jae Joe LPN

## 2021-12-20 ENCOUNTER — ANCILLARY PROCEDURE (OUTPATIENT)
Dept: CARDIOLOGY CLINIC | Age: 51
End: 2021-12-20

## 2021-12-20 ENCOUNTER — ANCILLARY PROCEDURE (OUTPATIENT)
Dept: CARDIOLOGY CLINIC | Age: 51
End: 2021-12-20
Payer: COMMERCIAL

## 2021-12-20 VITALS
HEIGHT: 69 IN | DIASTOLIC BLOOD PRESSURE: 70 MMHG | SYSTOLIC BLOOD PRESSURE: 110 MMHG | BODY MASS INDEX: 32.58 KG/M2 | WEIGHT: 220 LBS

## 2021-12-20 VITALS
BODY MASS INDEX: 32.14 KG/M2 | DIASTOLIC BLOOD PRESSURE: 70 MMHG | WEIGHT: 217 LBS | HEIGHT: 69 IN | SYSTOLIC BLOOD PRESSURE: 110 MMHG

## 2021-12-20 DIAGNOSIS — E78.5 HYPERLIPIDEMIA, UNSPECIFIED HYPERLIPIDEMIA TYPE: ICD-10-CM

## 2021-12-20 DIAGNOSIS — T67.1XXA HEAT SYNCOPE, INITIAL ENCOUNTER: ICD-10-CM

## 2021-12-20 DIAGNOSIS — E11.9 TYPE 2 DIABETES MELLITUS WITHOUT COMPLICATION, WITHOUT LONG-TERM CURRENT USE OF INSULIN (HCC): ICD-10-CM

## 2021-12-20 DIAGNOSIS — R07.2 PRECORDIAL PAIN: ICD-10-CM

## 2021-12-20 DIAGNOSIS — L74.9 SWEATING ABNORMALITY: ICD-10-CM

## 2021-12-20 DIAGNOSIS — Z72.0 TOBACCO USE: ICD-10-CM

## 2021-12-20 DIAGNOSIS — E66.09 CLASS 1 OBESITY DUE TO EXCESS CALORIES WITH SERIOUS COMORBIDITY AND BODY MASS INDEX (BMI) OF 31.0 TO 31.9 IN ADULT: ICD-10-CM

## 2021-12-20 LAB
ECHO AO ASC DIAM: 3.4 CM
ECHO AO ASCENDING AORTA INDEX: 1.58 CM/M2
ECHO AO ROOT DIAM: 3.2 CM
ECHO AO ROOT INDEX: 1.49 CM/M2
ECHO AV AREA PEAK VELOCITY: 3.3 CM2
ECHO AV AREA PEAK VELOCITY: 3.3 CM2
ECHO AV AREA VTI: 3.4 CM2
ECHO AV AREA VTI: 3.4 CM2
ECHO AV MEAN GRADIENT: 3 MMHG
ECHO AV MEAN VELOCITY: 0.8 M/S
ECHO AV PEAK GRADIENT: 5 MMHG
ECHO AV PEAK VELOCITY: 1.2 M/S
ECHO AV VELOCITY RATIO: 0.92
ECHO AV VTI: 21.8 CM
ECHO LA DIAMETER INDEX: 1.49 CM/M2
ECHO LA DIAMETER: 3.2 CM
ECHO LA TO AORTIC ROOT RATIO: 1
ECHO LA VOL 2C: 46 ML (ref 18–58)
ECHO LA VOL 4C: 44 ML (ref 18–58)
ECHO LA VOL BP: 45 ML (ref 18–58)
ECHO LA VOL BP: 45 ML (ref 18–58)
ECHO LA VOLUME AREA LENGTH: 48 ML
ECHO LA VOLUME INDEX A2C: 21 ML/M2 (ref 16–28)
ECHO LA VOLUME INDEX A4C: 20 ML/M2 (ref 16–28)
ECHO LA VOLUME INDEX AREA LENGTH: 22 ML/M2
ECHO LV E' LATERAL VELOCITY: 11 CM/S
ECHO LV E' SEPTAL VELOCITY: 8 CM/S
ECHO LV EDV A2C: 99 ML
ECHO LV EDV A4C: 115 ML
ECHO LV EDV BP: 108 ML (ref 67–155)
ECHO LV EDV INDEX A4C: 53 ML/M2
ECHO LV EDV INDEX BP: 50 ML/M2
ECHO LV EDV NDEX A2C: 46 ML/M2
ECHO LV EJECTION FRACTION A2C: 61 %
ECHO LV EJECTION FRACTION A4C: 57 %
ECHO LV EJECTION FRACTION BIPLANE: 59 % (ref 55–100)
ECHO LV ESV A2C: 39 ML
ECHO LV ESV A4C: 49 ML
ECHO LV ESV BP: 44 ML (ref 22–58)
ECHO LV ESV INDEX A2C: 18 ML/M2
ECHO LV ESV INDEX A4C: 23 ML/M2
ECHO LV ESV INDEX BP: 20 ML/M2
ECHO LV FRACTIONAL SHORTENING: 30 % (ref 28–44)
ECHO LV INTERNAL DIMENSION DIASTOLE INDEX: 2.19 CM/M2
ECHO LV INTERNAL DIMENSION DIASTOLIC: 4.7 CM (ref 4.2–5.9)
ECHO LV INTERNAL DIMENSION SYSTOLIC INDEX: 1.53 CM/M2
ECHO LV INTERNAL DIMENSION SYSTOLIC: 3.3 CM
ECHO LV IVSD: 0.8 CM (ref 0.6–1)
ECHO LV MASS 2D: 132.3 G (ref 88–224)
ECHO LV MASS INDEX 2D: 61.5 G/M2 (ref 49–115)
ECHO LV POSTERIOR WALL DIASTOLIC: 0.9 CM (ref 0.6–1)
ECHO LV RELATIVE WALL THICKNESS RATIO: 0.38
ECHO LVOT AREA: 3.5 CM2
ECHO LVOT AV VTI INDEX: 0.94
ECHO LVOT DIAM: 2.1 CM
ECHO LVOT MEAN GRADIENT: 2 MMHG
ECHO LVOT PEAK GRADIENT: 5 MMHG
ECHO LVOT PEAK VELOCITY: 1.1 M/S
ECHO LVOT STROKE VOLUME INDEX: 33 ML/M2
ECHO LVOT SV: 71 ML
ECHO LVOT VTI: 20.5 CM
ECHO MV A VELOCITY: 0.76 M/S
ECHO MV AREA PHT: 3.7 CM2
ECHO MV E DECELERATION TIME (DT): 204.5 MS
ECHO MV E VELOCITY: 0.71 M/S
ECHO MV E/A RATIO: 0.93
ECHO MV E/E' LATERAL: 6.45
ECHO MV E/E' RATIO (AVERAGED): 7.66
ECHO MV E/E' SEPTAL: 8.88
ECHO MV PRESSURE HALF TIME (PHT): 59.3 MS
ECHO RA MAJOR AXIS INDEX: 1.44 CM/M2
ECHO RA MAJOR AXIS: 3.1 CM
ECHO RV FREE WALL PEAK S': 11 CM/S
ECHO RV INTERNAL DIMENSION: 3.3 CM
ECHO RV TAPSE: 1.6 CM (ref 1.5–2)
NUC STRESS EJECTION FRACTION: 63 %
STRESS ANGINA INDEX: 0
STRESS BASELINE DIAS BP: 70 MMHG
STRESS BASELINE HR: 82 BPM
STRESS BASELINE ST DEPRESSION: 0 MM
STRESS BASELINE SYS BP: 110 MMHG
STRESS ESTIMATED WORKLOAD: 9.5 METS
STRESS EXERCISE DUR MIN: NORMAL
STRESS O2 SAT PEAK: 100 %
STRESS O2 SAT REST: 99 %
STRESS PEAK DIAS BP: 76 MMHG
STRESS PEAK SYS BP: 152 MMHG
STRESS PERCENT HR ACHIEVED: 88 %
STRESS POST PEAK HR: 150 BPM
STRESS RATE PRESSURE PRODUCT: NORMAL BPM*MMHG
STRESS SR DUKE TREADMILL SCORE: 0
STRESS ST DEPRESSION: 0 MM
STRESS TARGET HR: 170 BPM
TID: 1.12

## 2021-12-20 PROCEDURE — 93306 TTE W/DOPPLER COMPLETE: CPT | Performed by: SPECIALIST

## 2021-12-20 PROCEDURE — A9500 TC99M SESTAMIBI: HCPCS | Performed by: SPECIALIST

## 2021-12-20 PROCEDURE — 78452 HT MUSCLE IMAGE SPECT MULT: CPT | Performed by: SPECIALIST

## 2021-12-20 PROCEDURE — 93015 CV STRESS TEST SUPVJ I&R: CPT | Performed by: SPECIALIST

## 2021-12-20 RX ORDER — TETRAKIS(2-METHOXYISOBUTYLISOCYANIDE)COPPER(I) TETRAFLUOROBORATE 1 MG/ML
10 INJECTION, POWDER, LYOPHILIZED, FOR SOLUTION INTRAVENOUS ONCE
Status: COMPLETED | OUTPATIENT
Start: 2021-12-20 | End: 2021-12-20

## 2021-12-20 RX ORDER — TETRAKIS(2-METHOXYISOBUTYLISOCYANIDE)COPPER(I) TETRAFLUOROBORATE 1 MG/ML
30 INJECTION, POWDER, LYOPHILIZED, FOR SOLUTION INTRAVENOUS ONCE
Status: COMPLETED | OUTPATIENT
Start: 2021-12-20 | End: 2021-12-20

## 2021-12-20 RX ADMIN — TETRAKIS(2-METHOXYISOBUTYLISOCYANIDE)COPPER(I) TETRAFLUOROBORATE 8.6 MILLICURIE: 1 INJECTION, POWDER, LYOPHILIZED, FOR SOLUTION INTRAVENOUS at 10:15

## 2021-12-20 RX ADMIN — TETRAKIS(2-METHOXYISOBUTYLISOCYANIDE)COPPER(I) TETRAFLUOROBORATE 26 MILLICURIE: 1 INJECTION, POWDER, LYOPHILIZED, FOR SOLUTION INTRAVENOUS at 12:50

## 2021-12-23 NOTE — PROGRESS NOTES
Echo and stress test are normal  He was seen also for orthostasis and sweating  Is it any better on the Florinef? If stable, fu as below  If still symptomatic with orthostasis, then have nurse visit for orthostatics to decide whether to increase Florinef  Not sure what sweating is from?  Seeing Dr Alicia Yung next week    Future Appointments  1/6/2022   2:00 PM    MD DESIREE Liz                BS AMB  3/7/2022   2:20 PM    MD HUSSEIN Santacruz              BS AMB  3/16/2022  3:30 PM    Sis Diallo, NP         PAFP                BS AMB

## 2021-12-30 ENCOUNTER — TELEPHONE (OUTPATIENT)
Dept: CARDIOLOGY CLINIC | Age: 51
End: 2021-12-30

## 2021-12-30 NOTE — TELEPHONE ENCOUNTER
Two patient identifiers verified. Per MD patient called and given results of nuclear stress test and echo. Patient reports his symptoms have improved a little. He reports dizziness when driving long distances but not really other times. However he still complains of large amounts of sweating and shoulder pain. Let him know we will call with loop results soon. Will update MD. Patient verbalized understanding and appreciation.

## 2022-01-02 RX ORDER — CELECOXIB 200 MG/1
CAPSULE ORAL
Qty: 180 CAPSULE | Refills: 1 | Status: SHIPPED | OUTPATIENT
Start: 2022-01-02 | End: 2022-03-09 | Stop reason: SDUPTHER

## 2022-01-06 ENCOUNTER — VIRTUAL VISIT (OUTPATIENT)
Dept: RHEUMATOLOGY | Age: 52
End: 2022-01-06
Payer: COMMERCIAL

## 2022-01-06 DIAGNOSIS — L40.50 PSORIATIC ARTHRITIS (HCC): ICD-10-CM

## 2022-01-06 PROCEDURE — 99214 OFFICE O/P EST MOD 30 MIN: CPT | Performed by: INTERNAL MEDICINE

## 2022-01-06 RX ORDER — IXEKIZUMAB 80 MG/ML
80 INJECTION, SOLUTION SUBCUTANEOUS
Qty: 1 EACH | Refills: 11 | Status: SHIPPED | OUTPATIENT
Start: 2022-01-06 | End: 2022-08-30 | Stop reason: ALTCHOICE

## 2022-01-06 NOTE — PROGRESS NOTES
REASON FOR VISIT    This is a follow-up visit for Mr. Zaheer Gutierrez for     ICD-10-CM   1. Psoriatic arthritis ([facility] [address] L40.50   2. Psoriasis L40.9     The patient has consented for synchronous (real-time) Telemedicine (audio-video technology) on 1/6/2022 for their care to be delivered over telemedicine in place of their regularly scheduled office visit pursuant to the emergency declaration under the [address] [place] [address] waiver authority and the [employer] Act, this Virtual  Visit was conducted, with patient's consent, to reduce the patient's risk of exposure to COVID-19 and provide continuity of care for an established patient. Services were provided through a video synchronous discussion virtually to substitute for in-person clinic visit. Spondyloarthritis phenotype includes:  Anti-CCP positive: no  Rheumatoid factor positive: no  HLA-B27 positive: no  Inflammatory Back Pain: no  Erosive disease: yes  Sacroiliitis: no  Ankylosis: no  Psoriasis: yes  Enthesitis: no  Dactylitis: no  Nail Pitting: no  Onycholysis: no  Splinter Hemorrhage: no  Extra-articular manifestations include: none  SAPHO: no    Immunosuppression Screening (10/14/2021):   Quantiferon TB: negative  PPD:  Not performed  Hepatitis B: negative  Hepatitis C: negative    Therapy History includes:  Current NSAIDs include: Celebrex 200 mg twice daily  Current DMARD include: methotrexate 25 mg SubQ every Friday (6/24/2020 to 7/2021; 10/14/2021 to present), Taltz 80 mg every 4 weeks (9/17/2020 to 7/2021; 10/2021 to present)  Prior DMARD therapy includes: sulfasalazine 1000 mg twice daily (6/27/2012 to 8/13/2014; 12/02/2014 to 500 mg twice daily;10/02/2014 to 12/02/2014), methotrexate 20 mg every Saturday (8/13/2014 to 5/27/2015; insurance coverage, 6/28/2019 to 7/28/2019; did not refill, 8/29/2019 to 6/24/2020),  Enbrel, Humira, Remicade, Otezla 30 mg twice daily (5/2015 to 6/2018), Stelara, Cosentyx 300 mg every 30 days (6/2018 to 9/2019; loss of patient assistance, 1/03/2020 to 9/16/2020)  The following DMARDs have been ineffective: sulfasalazine, methotrexate PO, Otezla, Enbrel, Humira, Remicade, Stelara, Cosentyx   The following DMARDs were stopped because of side effects: none    HISTORY OF PRESENT ILLNESS    Mr. Alejandrina Hdez returns for a follow-up visit. On his last visit, I continued methotrexate 25 mg SubQ every Saturday and Taltz 80 mg monthly, which he has taken good tolerance. Due to being off treatment for more than 3 months, he was still flaring, so I prescribed prednisone 20 mg daily for 10 days. He had insurance and pharmacy changes and is pending his new shipment for [place]im[place]. His last dose was 12/11/2021. Today, he feels better but still has pain in some of his hands and toes (ankle). He also notes some swelling in his hands. He has been having right shoulder pain and back as well. His pain comes and goes. I had asked him to follow up with Dr. Thierry Sesay but he did not. He is smoking 5 to 10 cigarettes. Most recent toxicity monitoring by blood work was done on 10/12/2021 and did not reveal any significant adverse effects, except . I reviewed the patient's interval medical history, surgical history, medications, and allergies. The patient has not had any interval hospital admissions, infections, or surgeries. REVIEW OF SYSTEMS    A comprehensive review of systems was performed and pertinent results are documented in the HPI, review of systems is otherwise non-contributory. PAST MEDICAL HISTORY    He has a past medical history of Arthritis, Diabetes ([facility] [address] (5/27/2009), Elevated liver enzymes, Hypercholesterolemia, Psoriasis (5/27/2009), and Psoriatic arthritis ([facility] [address] (5/27/2009).     FAMILY HISTORY    His family history includes Asthma in his sister; Diabetes in his father and paternal grandmother; No Known Problems in his brother and mother. SOCIAL HISTORY    He reports that he has been smoking cigarettes. He has a 10.00 pack-year smoking history. He has never used smokeless tobacco. He reports that he does not drink alcohol and does not use drugs. MEDICATIONS    Current Outpatient Medications   Medication Sig    ixekizumab (Taltz Autoinjector) 80 mg/mL auto injector 1 mL by SubCUTAneous route every thirty (30) days.  celecoxib (CELEBREX) 200 mg capsule TAKE 1 CAPSULE BY MOUTH TWICE A DAY AS NEEDED FOR PAIN WITH FOOD    traZODone (DESYREL) 50 mg tablet Take 1 Tablet by mouth nightly as needed for Sleep.  empagliflozin (JARDIANCE) 25 mg tablet Take 1 Tablet by mouth daily.  fludrocortisone (FLORINEF) 0.1 mg tablet Take 1 Tablet by mouth two (2) times a day.  methotrexate 25 mg/mL chemo injection 1 mL by SubCUTAneous route Every Friday.  folic acid (FOLVITE) 1 mg tablet Take 1 Tablet by mouth daily.  predniSONE (DELTASONE) 20 mg tablet Take 20 mg by mouth daily (with breakfast).  Blood-Glucose Meter monitoring kit Use to check blood sugar daily. Dx E11.9. Fill per insurance formulary preference. (Patient not taking: Reported on 12/16/2021)    famotidine (PEPCID) 40 mg tablet TAKE 1 TABLET BY MOUTH EVERY DAY AT NIGHT    BD Insulin Syringe Ultra-Fine 1 mL 31 gauge x 5/16 syrg 1 EACH BY DOES NOT APPLY ROUTE EVERY SATURDAY FOR 12 DOSES. TO BE USED FOR METHOTREXATE SOLUTION    glimepiride (AMARYL) 4 mg tablet Take 1 Tab by mouth every morning.  metFORMIN ER (GLUCOPHAGE XR) 500 mg tablet Take 1 Tab by mouth two (2) times daily (with meals).  atorvastatin (LIPITOR) 40 mg tablet Take 1 Tab by mouth daily.  acetaminophen (TYLENOL) 500 mg tablet TAKE 1 TO 2 TABLETS BY MOUTH 3 TIMES A DAY AS NEEDED FOR PAIN    glucose blood VI test strips (BLOOD GLUCOSE TEST) strip Use to check blood sugar daily. Dx E11.9.  Pharmacist to choose based on insurance/glucose monitoring kit type (Patient not taking: Reported on 12/16/2021)  lancets misc Use to check blood sugar daily. Dx E11.9. Fill per insurance formulary preference. (Patient not taking: Reported on 12/16/2021)    glucose blood VI test strips (ASCENSIA AUTODISC VI, ONE TOUCH ULTRA TEST VI) strip Twice a day (Patient not taking: Reported on 12/16/2021)     No current facility-administered medications for this visit. ALLERGIES    Allergies   Allergen Reactions    Iodinated Contrast Media Shortness of Breath       PHYSICAL EXAMINATION    There were no vitals taken for this visit. There is no height or weight on file to calculate BMI. General: Patient is alert, oriented x 3, not in acute distress    HEENT:   Sclerae are not injected and appear moist.  There is no alopecia. Chest:  Breathing comfortably at room air    Skin:    Exam deferred  Previous exam    Psoriasis:     no (large patch lateral posterior right Achilles; RESOLVED)  Nail Pitting:     no  Onycholysis:     Yes (LEFT: 1st, 3rd, 5th, RIGHT: 4th)  Splinter Hemorrhage:   Yes (LEFT: 5th)  Palmoplantar pustulosis:   no  Acne fulminans:    no  Acne conglobata:    no  Hidradenitis Suppurativa:   no  Dissecting cellulitis of the scalp:  no  Pilonidal sinus:    no  Erythema nodosum:    no    Musculoskeletal:  A comprehensive musculoskeletal exam was performed for all joints of each upper and lower extremity and assessed for swelling, tenderness and range of motion.       Right elbow flexion deformity  Bilateral knee crepitus without effusion    Costochondritis:  no   Synovitis:   yes (see table: DIP LEFT: 2nd 3rd, 4th; RIGHT: 3rd)  Dactylitis:   no  Enthesitis:   no    Joint Count 12/1/2021 12/17/2020 9/16/2020 6/24/2020 1/3/2020 5/29/2019   Left elbow - Tender - - 1 - - -   Left elbow - Swollen - - 0 - - -   Left wrist- Tender - - - 1 1 -   Left wrist- Swollen - - - 0 1 -   Left 5th MCP - Tender - - - 1 - -   Left 5th MCP - Swollen - - - 0 - -   Left thumb IP - Tender 1 - - 1 - 1   Left thumb IP - Swollen 0 - - 1 - 0   Left 2nd PIP - Tender - - - - - 1   Left 2nd PIP - Swollen - - - - - 0   Left 3rd PIP - Tender - - 1 - - -   Left 3rd PIP - Swollen - - 1 - - -   Left 4th PIP - Tender - - - - - 1   Left 4th PIP - Swollen - - - - - 0   Left 5th PIP - Tender - - - - - 1   Left 5th PIP - Swollen - - - - - 0   Right elbow - Tender - 1 1 1 1 1   Right elbow - Swollen - 1 1 1 1 1   Right 1st MCP - Tender - 0 - - - -   Right 1st MCP - Swollen - 1 - - - -   Right thumb IP - Tender - - - 1 - -   Right thumb IP - Swollen - - - 0 - -   Right 3rd PIP - Tender 1 1 1 1 - -   Right 3rd PIP - Swollen 1 1 1 0 - -   Tender Joint Count (Total) 2 2 4 6 2 5   Swollen Joint Count (Total) 1 3 3 2 2 1     DATA REVIEW    Laboratory     Recent laboratory results were reviewed, summarized, and discussed with the patient. Imaging    Musculoskeletal Ultrasound    None    Radiographs    Bilateral Hand 10/12/2021: LEFT: no fracture, dislocation or other acute osseous or articular abnormality. The soft tissues are within normal limits. Joint space narrowing and erosive changes are noted predominantly in the third and fourth PIP joints, progressive compared to the prior exam. RIGHT: no fracture or other acute osseous or articular abnormality. The soft tissues are within normal limits. Mild joint space narrowing is now noted in the DIP and PIP joints of the second through fourth digits. Bilateral Hand 5/29/2019: LEFT: No fracture or dislocation on plain film. No joint space narrowing. No joint space erosion or periosteal reaction. Alignment is within normal limits. Bone mineralization is decreased. No soft tissue calcification. RIGHT: No fracture or dislocation on plain film. No joint space narrowing. There is no joint space erosion. There is subtle periostitis distal phalanx of the middle finger. Alignment is within normal limits. Bone mineralization is decreased. No soft tissue calcification.     Chest 3/30/2019: Cardiac monitoring wires overlie the thorax. The cardiomediastinal and hilar contours are within normal limits. The pulmonary vasculature is within normal limits. The lungs and pleural spaces are clear. The visualized bones and upper abdomen are age-appropriate. Right Elbow 12/15/2014: a moderate right elbow effusion.  A nondisplaced fracture of the radial head is suspected. No dislocation is shown. CT Imaging    CT Head without contrast 9/10/2010: normal ventricles and basal cisterns. No intracranial masses or hemorrhages are seen. No focal intraparenchymal low density abnormalities are seen. MR Imaging    MRI Right Elbow without contrast 12/22/2014: there is a large complex appearing elbow joint effusion. There is  diffuse chondral thinning. Mild diffuse osseous edema is shown. The elbow collateral ligaments are intact. No tendon derangement is demonstrated. No soft tissue mass is shown. DXA     None    PROCEDURE     Right Elbow Kenalog 40 mg IA. . (12/17/2020)     ASSESSMENT AND PLAN    This is a follow-up visit for Mr. Mary Kate Rollins. 1) Psoriatic Arthritis. (psoriasis, arthritis) He was maintained on methotrexate 25 mg SubQ every Friday and Tatlz 80 mg monthly, with good tolerance but was off treatment for at least 3 months while in [facility]. He resumed treatment in 10/2021. Today, he feels better but still complains of pain and swelling in some digits and feet. Celebrex helps his pain. I will have him follow up in person to chen Norris ordered sent. 2) Long Term Use of Immunosuppressants. The patient remains on high risk immunomodulatory medications (methotrexate, Taltz) and requires frequent toxicity monitoring by blood work to evaluate for toxicities. Respective labs were ordered (see below orders for details). 3) Bilateral Knee Osteoarthritis. I had referred him to physical therapy. This was not an active issue today. 4) Long Term Use of NSAIDs. He is on Celebrex. 5) Tobacco Use.  He is smoking 5 to 10 cigarettes. 6) Cervical Radiculitis. He was following with [facility]. I asked him to folow up with Dr. Karen Mccartney. The patient voiced understanding of the aforementioned assessment and plan. The patient has consented for synchronous (real-time) Telemedicine (audio-video technology) on 1/6/2022 for their care to be delivered over telemedicine in place of their regularly scheduled office visit pursuant to the emergency declaration under the [facility] Pending [facility] to [facility] waiver authority and the [employer] Act, this Virtual  Visit was conducted, with patient's consent, to reduce the patient's risk of exposure to COVID-19 and provide continuity of care for an established patient. A total of 36 minutes was spent on this visit, reviewing interval notes, interval testing results, ordering tests, refilling medications, documenting the findings in the note, patient education, counseling, and coordination of care as described above. All questions asked and answered. Services were provided through a video synchronous discussion virtually to substitute for in-person clinic visit.     TODAY'S ORDERS    Orders Placed This Encounter    ixekizumab Vickye Nail Autoinjector) 80 mg/mL auto injector     Future Appointments   Date Time Provider Gale Brewer   3/7/2022  2:20 PM MD HUSSEIN Espinoza BS AMB   3/9/2022  2:30 PM MD DESIREE Fernando BS AMB   3/9/2022  3:30 PM LAB ONLY PAFP BS AMB   3/16/2022  3:30 PM Jennine Fabry, NP PAFP BS AMB     Gerald Gonzalez MD, [address] [facility]    Adult Rheumatology   Rheumatology Ultrasound Certified  [facility]  A Part of [facility], [address] [place] [address]   Phone [phone]  Fax [phone]

## 2022-01-19 NOTE — TELEPHONE ENCOUNTER
Two patient identifiers verified. Per MD patient called and given results. Patient feels the Florinef is working and only feels dizzy when he drives over two hours. He still notes excessive sweating and shoulder pain. Confirmed follow up. Patient verbalized understanding and denies any further questions or concerns at this time.

## 2022-01-19 NOTE — TELEPHONE ENCOUNTER
Loop monitor 7-day. 12/10/2020 1/12/1621  Loop monitor is normal with an average heart rate of 89 bpm there were no significant arrhythmias seen heart rate response was normal.    Contact patient with normal results   is he feeling better on the Florinef? If not then stop and change to midodrine 5 mg twice daily. If he is no longer having presyncope he can continue Florinef.     Future Appointments   Date Time Provider Gale Brewer   3/7/2022  2:20 PM MD HUSSEIN Toure BS AMB   3/9/2022  2:30 PM MD DESIREE Alanis BS AMB   3/9/2022  3:30 PM LAB ONLY PAFYORDAN BS AMB   3/16/2022  3:30 PM Marie Diallo, TAL [facility] BS AMB

## 2022-01-19 NOTE — TELEPHONE ENCOUNTER
Good  Future Appointments   Date Time Provider Gale Brewer   3/7/2022  2:20 PM MD HUSSEIN Ayala BS AMB   3/9/2022  2:30 PM MD DESIREE Gaffney BS AMB   3/9/2022  3:30 PM LAB ONLY PA[facility] BS AMB   3/16/2022  3:30 PM Cheyenne Diallo, NP [facility] BS AMB     Keep same plans

## 2022-01-19 NOTE — TELEPHONE ENCOUNTER
----- Message from Humble Robles RN sent at 12/21/2021  8:55 AM EST -----  Patient's 7 day loop ready for review.

## 2022-03-05 DIAGNOSIS — K21.9 GASTROESOPHAGEAL REFLUX DISEASE, UNSPECIFIED WHETHER ESOPHAGITIS PRESENT: ICD-10-CM

## 2022-03-06 RX ORDER — OMEPRAZOLE 40 MG/1
40 CAPSULE, DELAYED RELEASE ORAL
Qty: 30 CAPSULE | Refills: 0 | Status: SHIPPED | OUTPATIENT
Start: 2022-03-06

## 2022-03-07 ENCOUNTER — OFFICE VISIT (OUTPATIENT)
Dept: CARDIOLOGY CLINIC | Age: 52
End: 2022-03-07
Payer: COMMERCIAL

## 2022-03-07 VITALS
BODY MASS INDEX: 33.03 KG/M2 | HEIGHT: 69 IN | DIASTOLIC BLOOD PRESSURE: 80 MMHG | RESPIRATION RATE: 16 BRPM | SYSTOLIC BLOOD PRESSURE: 130 MMHG | OXYGEN SATURATION: 96 % | HEART RATE: 96 BPM | WEIGHT: 223 LBS

## 2022-03-07 DIAGNOSIS — L74.9 SWEATING ABNORMALITY: ICD-10-CM

## 2022-03-07 DIAGNOSIS — E11.9 TYPE 2 DIABETES MELLITUS WITHOUT COMPLICATION, WITHOUT LONG-TERM CURRENT USE OF INSULIN (HCC): ICD-10-CM

## 2022-03-07 DIAGNOSIS — E78.5 HYPERLIPIDEMIA, UNSPECIFIED HYPERLIPIDEMIA TYPE: ICD-10-CM

## 2022-03-07 DIAGNOSIS — T67.1XXA HEAT SYNCOPE, INITIAL ENCOUNTER: Primary | ICD-10-CM

## 2022-03-07 DIAGNOSIS — R07.2 PRECORDIAL PAIN: ICD-10-CM

## 2022-03-07 PROCEDURE — 99214 OFFICE O/P EST MOD 30 MIN: CPT | Performed by: SPECIALIST

## 2022-03-07 RX ORDER — KETOCONAZOLE 20 MG/ML
SHAMPOO TOPICAL
COMMUNITY
Start: 2022-03-01

## 2022-03-07 RX ORDER — SITAGLIPTIN 100 MG/1
100 TABLET, FILM COATED ORAL DAILY
COMMUNITY
Start: 2022-01-11 | End: 2022-03-10 | Stop reason: SDUPTHER

## 2022-03-07 RX ORDER — LISINOPRIL 2.5 MG/1
2.5 TABLET ORAL DAILY
COMMUNITY
Start: 2022-01-11

## 2022-03-07 RX ORDER — PROPRANOLOL HYDROCHLORIDE 20 MG/1
20 TABLET ORAL DAILY
Qty: 30 TABLET | Refills: 3 | Status: SHIPPED | OUTPATIENT
Start: 2022-03-07 | End: 2022-10-10

## 2022-03-07 RX ORDER — FINASTERIDE 1 MG/1
TABLET, FILM COATED ORAL
COMMUNITY
Start: 2022-03-01 | End: 2022-10-10

## 2022-03-07 NOTE — Clinical Note
3/7/2022    Patient: Dalton Forman   YOB: 1970   Date of Visit: 3/7/2022     Pallaiv Flores NP  [place] [phone]  Via In Basket    Dear Pallavi Flores NP,      Thank you for referring Mr. Clau Montgomery to [address]  for evaluation. My notes for this consultation are attached. If you have questions, please do not hesitate to call me. I look forward to following your patient along with you.       Sincerely,    Angel Forde MD

## 2022-03-07 NOTE — PROGRESS NOTES
Rl ARVIZU Lilia     1970       Esteban Waggoner MD, [facility] [place]  Date of Visit-3/7/2022   PCP is TAL Reyes [facility] [place]  [facility]  HPI:  Scar Urrutia is a 49 y.o. male   3 month f/u. Pt previously referred by PCP for near syncope. Orthostatics are done today sand show pulse rising from 80 to 101. Pt sees Rheum for psoriatic arthritis. Pt seen by PCP on 11/8/21, note is reviewed. Has had episodes of sweating, feeling weak, and passing out. Pt had a nuclear stress test in 2019 that was normal perfusion with EF 63%. Pt takes Jardiance. Today. .. Pt's cardiac work up included normal nuclear and echo done 12/20/21. We recommended he start Florinef. His loop monitor was normal. He is being treated for RA with methotrexate. His last LDL from a year ago  Was 166. He has not been taking the Florinef. Pt states that he has still experienced excessive sweating, and is sweating today. Denies chest pain, edema, syncope or shortness of breath at rest, has no tachycardia, palpitations or sense of arrhythmia. Assessment/Plan:     Patient Instructions   Please start propanolol 20mg daily and call Jessica at [phone] in 2 weeks with how are you feeling. Please call Dr. Barber Lawson at [facility] [phone] for an appointment. 1. Heat syncope, initial encounter  No more syncope, still complains of excessive sweating. Normal work up by stress, echo. We will try him on 20 mg propanolol. 12/20/21    ECHO ADULT COMPLETE 12/20/2021 12/20/2021    Interpretation Summary    Left Ventricle: Left ventricle size is normal. Normal wall thickness. Normal wall motion. Normal left ventricular systolic function. EF by 2D Simpsons Biplane is 59%. Normal diastolic function. Signed by: Chuck Purdy MD on 12/20/2021  2:00 PM      2. Precordial pain  Nuke shows no abnormality.    12/20/21    NUCLEAR CARDIAC STRESS TEST 12/20/2021 12/21/2021    Interpretation Summary    Nuclear Findings: Normal treadmill myocardial perfusion study at 88 %% PHR. Findings suggest a low risk of myocardial ischemia.   Nuclear Findings: LV perfusion is normal.    Nuclear Findings: Normal left ventricular systolic function post-stress. LVEF measures 63%.   ECG: Resting ECG demonstrates normal sinus rhythm.   ECG: Stress ECG was negative for ischemia.   Stress Test: A Danilo protocol stress test was performed. Overall, the patient's exercise capacity was average for their age. The patient reached stage 3 of the protocol after exercising for 00:06:50 . Hemodynamics are adequate for diagnosis. Blood pressure demonstrated a normal response and heart rate demonstrated a normal response to stress. The patient's heart rate recovery was normal.    Perfusion Comments: Prone images were obtained. Prone imaging was helpful in correcting soft tissue attenuation. inferior attentuation artifact    Signed by: Dariusz Nunez MD on 12/20/2021  7:05 PM     3. Sweating abnormality  I don't find a cardiac reason, hasn't taken florinef anyway. Already sees rheumo. This is his chief complaint. Will refer to Dr. Fabian Aguiar at [address] [place] [address].     4. Hyperlipidemia, unspecified hyperlipidemia type  At goal , denies excess muscle aches or new liver issues  Key Antihyperlipidemia Meds             atorvastatin (LIPITOR) 40 mg tablet (Taking) Take 1 Tab by mouth daily. Lab Results   Component Value Date/Time    LDL, calculated 165.8 (H) 03/31/2021 12:16 PM          5. Type 2 diabetes mellitus without complication, without long-term current use of insulin ([facility])  Lab Results   Component Value Date/Time    Hemoglobin A1c 7.6 (H) 03/31/2021 12:16 PM    Hemoglobin A1c (POC) 8.1 12/16/2021 09:06 AM                Impression:   1. Heat syncope, initial encounter    2. Precordial pain    3. Sweating abnormality    4. Hyperlipidemia, unspecified hyperlipidemia type    5.  Type 2 diabetes mellitus without complication, without long-term current use of insulin ([facility]       Cardiac History:   No specialty comments available. Future Appointments   Date Time Provider Gale Brewer   3/9/2022  2:30 PM Sheldon Liu MD [facility]   3/9/2022  3:30 PM LAB ONLY [facility]   3/16/2022  3:30 PM Hodan Hernández NP [facility]        Patient Care Team:  Hodan Hernández NP as PCP - General (Nurse Practitioner)  Hodan Hernández NP as PCP - [address] [place] [address]  [facility] Provider  Daiana Rojo MD as Physician (Dermatology)  Mati Mobley MD as Physician (Orthopedic Surgery)  Marie Flor MD as Consulting Provider (Rheumatology)  Margie Hoyt DO as Consulting Provider (Physical Medicine and Rehabilitation)  Ginger Taylor MD (Cardiology)    ROS-except as noted above. . A complete cardiac and respiratory are reviewed and negative except as above ; Resp-denies wheezing  or productive cough,. Const- No unusual weight loss or fever; Neuro-no recent seizure or CVA ; GI- No BRBPR, abdom pain, bloating ; - no  hematuria   see supplement sheet, initialed and to be scanned by staff  Past Medical History:   Diagnosis Date    Arthritis     [facility] 5/27/2009    Elevated liver enzymes     resolved     Hypercholesterolemia     Psoriasis 5/27/2009    [facility]iatic arthritis ([facility] 5/27/2009      Social Hx= reports that he has been smoking cigarettes. He has a 10.00 pack-year smoking history. He has never used smokeless tobacco. He reports that he does not drink alcohol and does not use drugs. Exam and Labs:  /80   Pulse 96   Resp 16   Ht 5' 9\" (1.753 m)   Wt 223 lb (101.2 kg)   SpO2 96%   BMI 32.93 kg/m² Constitutional:  NAD, comfortable  Head: NC,AT. Eyes: No scleral icterus. Neck:  Neck supple. No JVD present. Throat: moist mucous membranes. Chest: Effort normal & normal respiratory excursion . Neurological: alert, conversant and oriented . Skin: Skin is not cold.  No obvious systemic rash noted. Not diaphoretic. No erythema. Psychiatric:  Grossly normal mood and affect. Behavior appears normal. Extremities:  no clubbing or cyanosis. Abdomen: non distended    Lungs:breath sounds normal. No stridor. distress, wheezes or  Rales. Heart: normal rate, regular rhythm, normal S1, S2, no murmurs, rubs, clicks or gallops , PMI non displaced. Edema: Edema is none. Lab Results   Component Value Date/Time    Cholesterol, total 252 (H) 03/31/2021 12:16 PM    HDL Cholesterol 44 03/31/2021 12:16 PM    LDL, calculated 165.8 (H) 03/31/2021 12:16 PM    Triglyceride 211 (H) 03/31/2021 12:16 PM    CHOL/HDL Ratio 5.7 (H) 03/31/2021 12:16 PM     Lab Results   Component Value Date/Time    Sodium 136 10/12/2021 08:40 AM    Potassium 3.9 10/12/2021 08:40 AM    Chloride 105 10/12/2021 08:40 AM    CO2 24 10/12/2021 08:40 AM    Anion gap 7 10/12/2021 08:40 AM    Glucose 170 (H) 10/12/2021 08:40 AM    BUN 16 10/12/2021 08:40 AM    Creatinine 0.90 10/12/2021 08:40 AM    BUN/Creatinine ratio 18 10/12/2021 08:40 AM    GFR est AA >60 10/12/2021 08:40 AM    GFR est non-AA >60 10/12/2021 08:40 AM    Calcium 9.4 10/12/2021 08:40 AM      Wt Readings from Last 3 Encounters:   03/07/22 223 lb (101.2 kg)   12/20/21 217 lb (98.4 kg)   12/20/21 220 lb (99.8 kg)      BP Readings from Last 3 Encounters:   03/07/22 130/80   12/20/21 110/70   12/20/21 110/70      Current Outpatient Medications   Medication Sig    Januvia 100 mg tablet Take 100 mg by mouth daily.  lisinopriL (PRINIVIL, ZESTRIL) 2.5 mg tablet Take 2.5 mg by mouth daily.  ketoconazole (NIZORAL) 2 % shampoo WORK INTO SCALP AND ALLOW TO SIT FOR 5 MINUTES BEFORE RINSING THREE TIMES WEEKLY.  finasteride (PROPECIA) 1 mg tablet Do not crush, chew, or split. Take 1 by mouth daily.  omeprazole (PRILOSEC) 40 mg capsule TAKE 1 CAP BY MOUTH DAILY (BEFORE BREAKFAST).  ixekizumab (Taltz Autoinjector) 80 mg/mL auto injector 1 mL by SubCUTAneous route every thirty (30) days.  celecoxib (CELEBREX) 200 mg capsule TAKE 1 CAPSULE BY MOUTH TWICE A DAY AS NEEDED FOR PAIN WITH FOOD    traZODone (DESYREL) 50 mg tablet Take 1 Tablet by mouth nightly as needed for Sleep.  empagliflozin (JARDIANCE) 25 mg tablet Take 1 Tablet by mouth daily.  methotrexate 25 mg/mL chemo injection 1 mL by SubCUTAneous route Every Friday.  folic acid (FOLVITE) 1 mg tablet Take 1 Tablet by mouth daily.  BD Insulin Syringe Ultra-Fine 1 mL 31 gauge x 5/16 syrg 1 EACH BY DOES NOT APPLY ROUTE EVERY SATURDAY FOR 12 DOSES. TO BE USED FOR METHOTREXATE SOLUTION    glimepiride (AMARYL) 4 mg tablet Take 1 Tab by mouth every morning.  metFORMIN ER (GLUCOPHAGE XR) 500 mg tablet Take 1 Tab by mouth two (2) times daily (with meals).  atorvastatin (LIPITOR) 40 mg tablet Take 1 Tab by mouth daily.  acetaminophen (TYLENOL) 500 mg tablet TAKE 1 TO 2 TABLETS BY MOUTH 3 TIMES A DAY AS NEEDED FOR PAIN     No current facility-administered medications for this visit. Impression see above.       Written by Denise Villalpando, as dictated by Dominick Clark MD.

## 2022-03-07 NOTE — PATIENT INSTRUCTIONS
Please start propanolol 20mg daily and call Jessica at [phone] in 2 weeks with how are you feeling. Please call Dr. Dave Ramos at [facility] [phone] for an appointment.

## 2022-03-09 ENCOUNTER — OFFICE VISIT (OUTPATIENT)
Dept: RHEUMATOLOGY | Age: 52
End: 2022-03-09
Payer: COMMERCIAL

## 2022-03-09 VITALS
WEIGHT: 224 LBS | BODY MASS INDEX: 33.18 KG/M2 | HEIGHT: 69 IN | SYSTOLIC BLOOD PRESSURE: 108 MMHG | DIASTOLIC BLOOD PRESSURE: 75 MMHG | RESPIRATION RATE: 18 BRPM | TEMPERATURE: 97.6 F | HEART RATE: 81 BPM

## 2022-03-09 DIAGNOSIS — L40.9 PSORIASIS: ICD-10-CM

## 2022-03-09 DIAGNOSIS — L40.50 PSORIATIC ARTHRITIS (HCC): Primary | ICD-10-CM

## 2022-03-09 DIAGNOSIS — Z79.60 LONG-TERM USE OF IMMUNOSUPPRESSANT MEDICATION: ICD-10-CM

## 2022-03-09 PROCEDURE — 99215 OFFICE O/P EST HI 40 MIN: CPT | Performed by: INTERNAL MEDICINE

## 2022-03-09 RX ORDER — FOLIC ACID 1 MG/1
3 TABLET ORAL DAILY
Qty: 270 TABLET | Refills: 3 | Status: SHIPPED | OUTPATIENT
Start: 2022-03-09 | End: 2022-08-10 | Stop reason: SDUPTHER

## 2022-03-09 RX ORDER — CELECOXIB 200 MG/1
200 CAPSULE ORAL
Qty: 180 CAPSULE | Refills: 1 | Status: SHIPPED | OUTPATIENT
Start: 2022-03-09

## 2022-03-09 RX ORDER — CLOBETASOL PROPIONATE 0.5 MG/G
OINTMENT TOPICAL 2 TIMES DAILY
Qty: 45 G | Refills: 1 | Status: SHIPPED | OUTPATIENT
Start: 2022-03-09

## 2022-03-09 NOTE — PROGRESS NOTES
REASON FOR VISIT    This is a new Rheumatology provider follow-up visit for Mr. Katherine Alvarado for     ICD-10-CM   1. Psoriatic arthritis ([facility] L40.50   2. Psoriasis L40.9       Spondyloarthritis phenotype includes:  Anti-CCP positive: no  Rheumatoid factor positive: no  HLA-B27 positive: no  Inflammatory Back Pain: no  Erosive disease: yes  Sacroiliitis: no  Ankylosis: no  Psoriasis: yes  Enthesitis: no  Dactylitis: no  Nail Pitting: no  Onycholysis: no  Splinter Hemorrhage: no  Extra-articular manifestations include: none  SAPHO: no    Immunosuppression Screening (10/14/2021): Quantiferon TB: negative  PPD:  Not performed  Hepatitis B: negative  Hepatitis C: negative    Therapy History includes:  Current NSAIDs include: Celebrex 200 mg twice daily  Current DMARD include: methotrexate 25 mg SubQ every Friday (6/24/2020 to 7/2021; 10/14/2021 to present), Taltz 80 mg every 4 weeks (9/17/2020 to 7/2021; 10/2021 to present)  Prior DMARD therapy includes: sulfasalazine 1000 mg twice daily (6/27/2012 to 8/13/2014; 12/02/2014 to 500 mg twice daily;10/02/2014 to 12/02/2014), methotrexate 20 mg every Saturday (8/13/2014 to 5/27/2015; insurance coverage, 6/28/2019 to 7/28/2019; did not refill, 8/29/2019 to 6/24/2020),  Enbrel, Humira, Remicade, Otezla 30 mg twice daily (5/2015 to 6/2018), Stelara, Cosentyx 300 mg every 30 days (6/2018 to 9/2019; loss of patient assistance, 1/03/2020 to 9/16/2020)  The following DMARDs have been ineffective: sulfasalazine, methotrexate PO, Otezla, Enbrel, Humira, Remicade, Stelara, Cosentyx   The following DMARDs were stopped because of side effects: none    HISTORY OF PRESENT ILLNESS    Mr. Katherine Alvarado returns for an in-office follow-up visit. Remains on Mel [place], which he feels helps as long as his doses aren't delayed. Taking methotrexate     Now, primary concern is radicular pain down the right neck down the arm which makes sleep difficult. Right arm also numb when he wakes up.  Hasn't seen  [facility] since 10/2020. No consistent weakness in right hand. Hyperhidrosis, feels he sweats too easily with prolonged sitting, can happen unpredictably not consistently with palpitations. Mostly face/hairline. For the last 2-3 days, having more right PIP3 tenderness and swelling. Hair thinning, on finasteride and ketoconazole through his Dermatologist (Dr. Madhu Lemos). Has one lone proximal dorsal ankle plaque which has persisted through treatment with Taltz. The patient has not had any interval hospital admissions, infections, or surgeries. REVIEW OF SYSTEMS    A comprehensive review of systems was performed and pertinent results are documented in the HPI, review of systems is otherwise non-contributory. PAST MEDICAL HISTORY    He has a past medical history of Arthritis, Diabetes ([facility] (5/27/2009), Elevated liver enzymes, Hypercholesterolemia, Psoriasis (5/27/2009), and Psoriatic arthritis ([facility] (5/27/2009). FAMILY HISTORY    His family history includes Asthma in his sister; Diabetes in his father and paternal grandmother; No Known Problems in his brother and mother. SOCIAL HISTORY    He reports that he has been smoking cigarettes. He has a 10.00 pack-year smoking history. He has never used smokeless tobacco. He reports that he does not drink alcohol and does not use drugs. MEDICATIONS    Current Outpatient Medications   Medication Sig    Januvia 100 mg tablet Take 100 mg by mouth daily.  lisinopriL (PRINIVIL, ZESTRIL) 2.5 mg tablet Take 2.5 mg by mouth daily.  ketoconazole (NIZORAL) 2 % shampoo WORK INTO SCALP AND ALLOW TO SIT FOR 5 MINUTES BEFORE RINSING THREE TIMES WEEKLY.  finasteride (PROPECIA) 1 mg tablet Do not crush, chew, or split. Take 1 by mouth daily.  ixekizumab (Taltz Autoinjector) 80 mg/mL auto injector 1 mL by SubCUTAneous route every thirty (30) days.     celecoxib (CELEBREX) 200 mg capsule TAKE 1 CAPSULE BY MOUTH TWICE A DAY AS NEEDED FOR PAIN WITH FOOD    traZODone (DESYREL) 50 mg tablet Take 1 Tablet by mouth nightly as needed for Sleep.  empagliflozin (JARDIANCE) 25 mg tablet Take 1 Tablet by mouth daily.  methotrexate 25 mg/mL chemo injection 1 mL by SubCUTAneous route Every Friday.  folic acid (FOLVITE) 1 mg tablet Take 1 Tablet by mouth daily.  BD Insulin Syringe Ultra-Fine 1 mL 31 gauge x 5/16 syrg 1 EACH BY DOES NOT APPLY ROUTE EVERY SATURDAY FOR 12 DOSES. TO BE USED FOR METHOTREXATE SOLUTION    glimepiride (AMARYL) 4 mg tablet Take 1 Tab by mouth every morning.  metFORMIN ER (GLUCOPHAGE XR) 500 mg tablet Take 1 Tab by mouth two (2) times daily (with meals).  atorvastatin (LIPITOR) 40 mg tablet Take 1 Tab by mouth daily.  acetaminophen (TYLENOL) 500 mg tablet TAKE 1 TO 2 TABLETS BY MOUTH 3 TIMES A DAY AS NEEDED FOR PAIN    propranoloL (INDERAL) 20 mg tablet Take 1 Tablet by mouth daily. (Patient not taking: Reported on 3/9/2022)    omeprazole (PRILOSEC) 40 mg capsule TAKE 1 CAP BY MOUTH DAILY (BEFORE BREAKFAST). (Patient not taking: Reported on 3/9/2022)     No current facility-administered medications for this visit. ALLERGIES    Allergies   Allergen Reactions    Iodinated Contrast Media Shortness of Breath       PHYSICAL EXAMINATION    Visit Vitals  /75   Pulse 81   Temp 97.6 °F (36.4 °C)   Resp 18   Ht 5' 9\" (1.753 m)   Wt 224 lb (101.6 kg)   BMI 33.08 kg/m²     Body mass index is 33.08 kg/m². General:  The patient is well developed, well nourished, alert, and in no apparent distress. Eyes: Sclera are anicteric. No conjunctival injection. HEENT:  Oropharynx is clear. No oral ulcers. Adequate salivary pooling. No cervical or supraclavicular lymphadenopathy. Lungs:  Clear to auscultation bilaterally, without wheeze or stridor. Normal respiratory effort. Cor:  Regular rate and rhythm. No murmur rub or gallop. Abdomen: Soft, non-tender, without hepatomegaly or masses. Extremities: No calf tenderness or edema. Warm and well perfused. Skin:  Psoriaform plaque proximal to right lateral achilles. No current nail abnormalities. Neuro: Nonfocal  Musculoskeletal:    A comprehensive musculoskeletal exam was performed for all joints of each upper and lower extremity and assessed for swelling, tenderness and range of motion. Results are documented as below:  Right basilar neck pain most prominently on full leftward rotation. Right elbow limited by ~5deg from full extension, no warmth or effusion  Tenderness to right PIP3 and PIP4 without sam synovitis. No evidence of synovitis in the small joints of the hands, wrists, shoulders, elbows, hips, knees or ankles. DATA REVIEW    Laboratory     Recent laboratory results were reviewed, summarized, and discussed with the patient. Imaging    Musculoskeletal Ultrasound    None    Radiographs    Bilateral Hand 10/12/2021: LEFT: no fracture, dislocation or other acute osseous or articular abnormality. The soft tissues are within normal limits. Joint space narrowing and erosive changes are noted predominantly in the third and fourth PIP joints, progressive compared to the prior exam. RIGHT: no fracture or other acute osseous or articular abnormality. The soft tissues are within normal limits. Mild joint space narrowing is now noted in the DIP and PIP joints of the second through fourth digits. Bilateral Hand 5/29/2019: LEFT: No fracture or dislocation on plain film. No joint space narrowing. No joint space erosion or periosteal reaction. Alignment is within normal limits. Bone mineralization is decreased. No soft tissue calcification. RIGHT: No fracture or dislocation on plain film. No joint space narrowing. There is no joint space erosion. There is subtle periostitis distal phalanx of the middle finger. Alignment is within normal limits. Bone mineralization is decreased. No soft tissue calcification. Chest 3/30/2019: Cardiac monitoring wires overlie the thorax.  The cardiomediastinal and hilar contours are within normal limits. The pulmonary vasculature is within normal limits. The lungs and pleural spaces are clear. The visualized bones and upper abdomen are age-appropriate. Right Elbow 12/15/2014: a moderate right elbow effusion.  A nondisplaced fracture of the radial head is suspected. No dislocation is shown. CT Imaging    CT Head without contrast 9/10/2010: normal ventricles and basal cisterns. No intracranial masses or hemorrhages are seen. No focal intraparenchymal low density abnormalities are seen. MR Imaging    MRI Right Elbow without contrast 12/22/2014: there is a large complex appearing elbow joint effusion. There is  diffuse chondral thinning. Mild diffuse osseous edema is shown. The elbow collateral ligaments are intact. No tendon derangement is demonstrated. No soft tissue mass is shown. DXA     None    PROCEDURE     Right Elbow Kenalog 40 mg IA. . (12/17/2020)     ASSESSMENT AND PLAN    This is a follow-up visit for Mr. Virgilio Drake for psoriatic arthritis in clinical remission on regular high-dose methotrexate and Taltz. Will trial higher folic acid for alopecia    1. Psoriatic arthritis (HCC)  -Cont methotrexate 25mg subQ weekly and Taltz monthly. - C REACTIVE PROTEIN, QT; Future  - CBC WITH AUTOMATED DIFF; Future  - METABOLIC PANEL, COMPREHENSIVE; Future  - SED RATE (ESR); Future  - clobetasoL (TEMOVATE) 0.05 % ointment; Apply  to affected area two (2) times a day. Apply to right ankle  Dispense: 45 g; Refill: 1  - celecoxib (CELEBREX) 200 mg capsule; Take 1 Capsule by mouth two (2) times daily as needed for Pain. Dispense: 180 Capsule; Refill: 1  - folic acid (FOLVITE) 1 mg tablet; Take 3 Tablets by mouth daily. Dispense: 270 Tablet; Refill: 3    2. Long-term use of immunosuppressant medication  - CBC WITH AUTOMATED DIFF; Future  - METABOLIC PANEL, COMPREHENSIVE; Future  - folic acid (FOLVITE) 1 mg tablet; Take 3 Tablets by mouth daily.   Dispense: 270 Tablet; Refill: 3    3. Psoriasis  - clobetasoL (TEMOVATE) 0.05 % ointment; Apply  to affected area two (2) times a day. Apply to right ankle  Dispense: 45 g; Refill: 1    Patient Instructions   1. Increase folic acid to 3mg daily to see if this helps with the hair thinning (it may take 3-6 months to notice changes). 2. Continue methotrexate 25mg subQ weekly, and Taltz injections monthly    3. I have prescribed clobetasol to apply to the ankle rash twice a day as needed    4. Repeat labs today on the way out    5. Return in 4 months.         TODAY'S ORDERS    Orders Placed This Encounter    C REACTIVE PROTEIN, QT    CBC WITH AUTOMATED DIFF    METABOLIC PANEL, COMPREHENSIVE    SED RATE (ESR)    clobetasoL (TEMOVATE) 0.05 % ointment    celecoxib (CELEBREX) 200 mg capsule    folic acid (FOLVITE) 1 mg tablet     Future Appointments   Date Time Provider Gale Brewer   4/13/2022  3:45 PM Sunshine Herring NP [facility]     Face to face time:27  minutes  Note preparation and records review day of service: 25 minutes  Total provider time day of service: 46 minutes      Concha Mcnally MD [facility]    Adult Rheumatology   [facility]  A Part of [facility], [address] [facility]   Phone [phone]  Fax [phone]

## 2022-03-09 NOTE — PATIENT INSTRUCTIONS
1. Increase folic acid to 3mg daily to see if this helps with the hair thinning (it may take 3-6 months to notice changes). 2. Continue methotrexate 25mg subQ weekly, and Taltz injections monthly    3. I have prescribed clobetasol to apply to the ankle rash twice a day as needed    4. Repeat labs today on the way out    5. Return in 4 months.

## 2022-03-09 NOTE — PROGRESS NOTES
REASON FOR VISIT    This is a follow-up visit for Mr. Sulma Torre for     ICD-10-CM   1. Psoriatic arthritis ([facility]) L40.50   2. Psoriasis L40.9     Spondyloarthritis phenotype includes:  Anti-CCP positive: no  Rheumatoid factor positive: no  HLA-B27 positive: no  Inflammatory Back Pain: no  Erosive disease: no  Sacroiliitis: no  Ankylosis: no  Psoriasis: yes  Enthesitis: no  Dactylitis: no  Nail Pitting: no  Onycholysis: no  Splinter Hemorrhage: no  Extra-articular manifestations include: none  SAPHO: no    Immunosuppression Screening (6/24/2020):  Quantiferon TB: negative  PPD:  Not performed  Hepatitis B: negative  Hepatitis C: negative    Therapy History includes:  Current NSAIDs include: Celebrex 200 mg twice daily  Current DMARD include: methotrexate 25 mg every Friday (6/24/2020 to present), Taltz 80 mg every 4 weeks (9/17/2020 to present)  Prior DMARD therapy includes: sulfasalazine 1000 mg twice daily (6/27/2012 to 8/13/2014; 12/02/2014 to 500 mg twice daily;10/02/2014 to 12/02/2014), methotrexate 20 mg every Saturday (8/13/2014 to 5/27/2015; insurance coverage, 6/28/2019 to 7/28/2019; did not refill, 8/29/2019 to 6/24/2020),  Enbrel, Humira, Remicade, Otezla 30 mg twice daily (5/2015 to 6/2018), Stelara, Cosentyx 300 mg every 30 days (6/2018 to 9/2019; loss of patient assistance, 1/03/2020 to 9/16/2020)  The following DMARDs have been ineffective: sulfasalazine, methotrexate PO, Otezla, Enbrel, Humira, Remicade, Stelara, Cosentyx   The following DMARDs were stopped because of side effects: none    HISTORY OF PRESENT ILLNESS    Mr. Sulma Torre returns for a follow-up visit. On his last visit, I continued methotrexate 25 mg SubQ every Saturday and changed Cosentyx 300 mg every 30 days to [place], which he has taken good tolerance.  I asked him to follow up with Dr. Harmony Alba for his elbow who he saw on 9/22/2020 who felt that his complaints were proximal (neck shoulder) so referred him to Dr. Tomasa Ho who he saw on 10/06/2020, who ordered an MRI cervical spine. He armond his labs on 12/15/2020. Today, he feels better on Taltz. He has much less pain in his hands, especially in DIPs. He may have some pain and swelling in his DIPs but not like before. He feels that his right elbow is better with improved ROM but not normal. There is not much pain anymore. He has a very small psoriasis plaque on his left shin. He complains of neck pain to shoulder pain. He did not get his MRI done. He is smoking 5 to 10 cigarettes. He denies fever, weight loss, blurred vision, vision loss, oral ulcers, ankle swelling, dry cough, dyspnea, nausea, vomiting, dysphagia, abdominal pain, black or bloody stool, fall since last visit, rash, easy bruising and increased thirst.    Most recent toxicity labs on 6/24/2020 revealed no abnormalities. The patient has not had any interval hospital admissions, infections, or surgeries. REVIEW OF SYSTEMS    A comprehensive review of systems was performed and pertinent results are documented in the HPI, review of systems is otherwise non-contributory. PAST MEDICAL HISTORY    He has a past medical history of Arthritis, Diabetes ([facility] (5/27/2009), Elevated liver enzymes, Hypercholesterolemia, Psoriasis (5/27/2009), and Psoriatic arthritis ([facility] (5/27/2009). FAMILY HISTORY    His family history includes Asthma in his sister; Diabetes in his father and paternal grandmother; No Known Problems in his brother and mother. SOCIAL HISTORY    He reports that he has been smoking cigarettes. He has a 10.00 pack-year smoking history. He has never used smokeless tobacco. He reports that he does not drink alcohol or use drugs. MEDICATIONS    Current Outpatient Medications   Medication Sig    [START ON 12/18/2020] methotrexate 25 mg/mL chemo injection 1 mL by SubCUTAneous route Every Friday.  folic acid (FOLVITE) 1 mg tablet Take 1 Tab by mouth daily.     triamcinolone acetonide (Kenalog) 40 mg/mL oral injection 1 mL by Intra artICUlar route once for 1 dose.  lidocaine (XYLOCAINE) 10 mg/mL (1 %) injection 1 mL by Intra artICUlar route once for 1 dose.  celecoxib (CELEBREX) 200 mg capsule TAKE 1 CAPSULE BY MOUTH TWICE A DAY AS NEEDED FOR PAIN WITH FOOD    ixekizumab (Taltz Autoinjector) 80 mg/mL auto injector 1 mL by SubCUTAneous route every thirty (30) days. Taltz Maintenance after week 12 of loadin mg every 4 weeks.  acetaminophen (TYLENOL) 500 mg tablet TAKE 1 TO 2 TABLETS BY MOUTH 3 TIMES A DAY AS NEEDED FOR PAIN    metFORMIN ER (GLUCOPHAGE XR) 500 mg tablet TAKE 1 TAB BY MOUTH DAILY (WITH DINNER).  atorvastatin (LIPITOR) 20 mg tablet TAKE 1 TABLET BY MOUTH EVERY DAY    Omeprazole delayed release (PRILOSEC D/R) 20 mg tablet Take 1 Tab by mouth daily.  Blood-Glucose Meter monitoring kit Use to check blood sugar daily. Dx E11.9. Fill per insurance formulary preference.  glucose blood VI test strips (BLOOD GLUCOSE TEST) strip Use to check blood sugar daily. Dx E11.9. Pharmacist to choose based on insurance/glucose monitoring kit type    lancets misc Use to check blood sugar daily. Dx E11.9. Fill per insurance formulary preference.  amitriptyline (ELAVIL) 50 mg tablet TAKE 1 TABLET BY MOUTH EVERYDAY AT BEDTIME    JARDIANCE 10 mg tablet TAKE 1 TABLET BY MOUTH EVERY DAY    nicotine (NICODERM CQ) 21 mg/24 hr APPLY 1 PATCH EVERY DAY AS DIRECTED    glimepiride (AMARYL) 2 mg tablet Take 4 mg by mouth every morning.  SITagliptin (JANUVIA) 100 mg tablet Take 100 mg by mouth daily.  glucose blood VI test strips (ASCENSIA AUTODISC VI, ONE TOUCH ULTRA TEST VI) strip Twice a day    ixekizumab (Taltz Autoinjector, 2 Pack,) 80 mg/mL auto injector Taltz Loading at weeks 0 to 2: Inject 160 mg (two 80 mg) syringes once    ixekizumab (Taltz Autoinjector) 80 mg/mL auto injector 1 mL by SubCUTAneous route every fourteen (14) days. Taltz Loading at weeks 2 to 12:  Inject 80 mg on week 2, 4, 6, 8, 10, 12     No current facility-administered medications for this visit. ALLERGIES    Allergies   Allergen Reactions    Iodinated Contrast Media Shortness of Breath       PHYSICAL EXAMINATION    Visit Vitals  BP (!) 108/59   Pulse 83   Temp 97.3 °F (36.3 °C)   Resp 18   Wt 220 lb (99.8 kg)   BMI 32.49 kg/m²     Body mass index is 32.49 kg/m². General: Patient is alert, oriented x 3, not in acute distress    HEENT:   Sclerae are not injected and appear moist.  There is no alopecia. Cardiovascular:  Heart is regular rate and rhythm, no murmurs. Chest:  Lungs are clear to auscultation bilaterally. Extremities:  Free of clubbing, cyanosis, edema  Skin:    Psoriasis:     no (large patch lateral posterior right Achilles; RESOLVED)  Nail Pitting:     no  Onycholysis:     Yes (LEFT: 1st, 3rd, 5th, RIGHT: 4th)  Splinter Hemorrhage:   Yes (LEFT: 5th)  Palmoplantar pustulosis:   no  Acne fulminans:    no  Acne conglobata:    no  Hidradenitis Suppurativa:   no  Dissecting cellulitis of the scalp:  no  Pilonidal sinus:    no  Erythema nodosum:    no    Musculoskeletal:  A comprehensive musculoskeletal exam was performed for all joints of each upper and lower extremity and assessed for swelling, tenderness and range of motion.       Right elbow flexion deformity  Bilateral knee crepitus without effusion    Costochondritis:  no   Synovitis:   yes (see table: DIP LEFT: 3rd, 4th)  Dactylitis:   no  Enthesitis:   no    Joint Count 12/17/2020 9/16/2020 6/24/2020 1/3/2020 5/29/2019   Left elbow - Tender - 1 - - -   Left elbow - Swollen - 0 - - -   Left wrist- Tender - - 1 1 -   Left wrist- Swollen - - 0 1 -   Left 5th MCP - Tender - - 1 - -   Left 5th MCP - Swollen - - 0 - -   Left thumb IP - Tender - - 1 - 1   Left thumb IP - Swollen - - 1 - 0   Left 2nd PIP - Tender - - - - 1   Left 2nd PIP - Swollen - - - - 0   Left 3rd PIP - Tender - 1 - - -   Left 3rd PIP - Swollen - 1 - - -   Left 4th PIP - Tender - - - - 1 Left 4th PIP - Swollen - - - - 0   Left 5th PIP - Tender - - - - 1   Left 5th PIP - Swollen - - - - 0   Right elbow - Tender 1 1 1 1 1   Right elbow - Swollen 1 1 1 1 1   Right 1st MCP - Tender 0 - - - -   Right 1st MCP - Swollen 1 - - - -   Right thumb IP - Tender - - 1 - -   Right thumb IP - Swollen - - 0 - -   Right 3rd PIP - Tender 1 1 1 - -   Right 3rd PIP - Swollen 1 1 0 - -   Tender Joint Count (Total) 2 4 6 2 5   Swollen Joint Count (Total) 3 3 2 2 1     DATA REVIEW    Laboratory     Recent laboratory results were reviewed, summarized, and discussed with the patient. Imaging    Musculoskeletal Ultrasound    None    Radiographs    Bilateral Hand 5/29/2019: LEFT: No fracture or dislocation on plain film. No joint space narrowing. No joint space erosion or periosteal reaction. Alignment is within normal limits. Bone mineralization is decreased. No soft tissue calcification. RIGHT: No fracture or dislocation on plain film. No joint space narrowing. There is no joint space erosion. There is subtle periostitis distal phalanx of the middle finger. Alignment is within normal limits. Bone mineralization is decreased. No soft tissue calcification. Chest 3/30/2019: Cardiac monitoring wires overlie the thorax. The cardiomediastinal and hilar contours are within normal limits. The pulmonary vasculature is within normal limits. The lungs and pleural spaces are clear. The visualized bones and upper abdomen are age-appropriate. Right Elbow 12/15/2014: a moderate right elbow effusion.  A nondisplaced fracture of the radial head is suspected. No dislocation is shown. CT Imaging    CT Head without contrast 9/10/2010: normal ventricles and basal cisterns. No intracranial masses or hemorrhages are seen. No focal intraparenchymal low density abnormalities are seen. MR Imaging    MRI Right Elbow without contrast 12/22/2014: there is a large complex appearing elbow joint effusion.  There is  diffuse chondral thinning. Mild diffuse osseous edema is shown. The elbow collateral ligaments are intact. No tendon derangement is demonstrated. No soft tissue mass is shown. DXA     None    PROCEDURE     [facility]  OFFICE PROCEDURE PROGRESS NOTE      Symptom relief from Right Elbow Arthritis. (12/17/20)     Chart reviewed for the following:   Lon Ruiz MD, have reviewed the History, Physical and updated the Allergic reactions for Rl Rodrigues     TIME OUT performed immediately prior to start of procedure:   IKay MD, have performed the following reviews on Rl Rodrigues prior to the start of the procedure            * Patient was identified by name and date of birth   * Agreement on procedure being performed was verified  * Risks and Benefits explained to the patient  * Procedure site verified and marked as necessary  * Patient was positioned for comfort  * Consent was signed and verified     Time: 2:10 PM    Date of procedure: 12/17/2020    Procedure performed by: Kay Flores MD    Provider assisted by: self    Patient assisted by: self    How tolerated by patient: tolerated the procedure well with no complications    Post Procedural Pain Scale: 0 - No Hurt    Comments: none    Indications:   Symptom relief from Right Elbow. (12/17/2020)     Procedure:   After consent was obtained, and timeout performed, using sterile technique the Right Elbow joint was prepped using Chlorprep. Local anesthetic used: Ethyl Chloride. The joint was entered and 0 ml's of fluid was withdrawn. Kenalog 40 mg was mixed with 1% lidocaine 1 ml and injected into the posterior olecranon joint fossa and the needle withdrawn. The procedure was well tolerated. The patient was asked to continue to rest the joint for a few more days before resuming regular activities. It may be more painful for the first 1-2 days. Watch for fever, or increased swelling or persistent pain in the joint.  Call or return to clinic as needed if such symptoms occur or there is failure to improve as anticipated. ASSESSMENT AND PLAN    This is a follow-up visit for Mr. Tova Brunner. 1) Psoriatic Arthritis. (psoriasis, arthritis) He is maintained on methotrexate 25 mg SubQ every Saturday and Tatlz 80 mg monthly, with good tolerance and feels about much better. He has little active arthritis, mostly in his left DIPs and chronic damage in his right elbow with improved ROM. He no longer has psoriasis. Since his right elbow has improved but normalized, I injected it today with good tolerance to hopefully improve it. I will continue treatment. He armond his labs on 12/15/2020.     2) Long Term Use of Immunosuppressants. The patient remains on immunomodulatory medications (methotrexate, Taltz) and requires frequent toxicity monitoring by blood work. 3) Bilateral Knee Osteoarthritis. I had referred him to physical therapy. This was not an active issue today. 4) Long Term Use of NSAIDs. The patient remains on Celebrex. His most recent renal function was normal.      5) Tobacco Use. He is smoking 5 to 10 cigarettes. 6) Cervical Radiculitis. He is following with [facility] and has not done his MRI cervical spine. I gave him the # to schedule it. The patient voiced understanding of the aforementioned assessment and plan.      TODAY'S ORDERS    Orders Placed This Encounter    TRIAMCINOLONE ACETONIDE INJ    VA DRAIN/INJECT INTERMEDIATE JOINT/BURSA - 20605    methotrexate 25 mg/mL chemo injection    folic acid (FOLVITE) 1 mg tablet    triamcinolone acetonide (Kenalog) 40 mg/mL injection    lidocaine (XYLOCAINE) 10 mg/mL (1 %) injection     Future Appointments   Date Time Provider Gale Brewer   12/28/2020  6:15 AM [facility] MRI [address]a MD, [address] [facility] [place]    Adult Rheumatology   Rheumatology Ultrasound Certified  [facility]  [address] [facility] [address], [address] [facility]   Phone [phone]  Fax [phone]

## 2022-03-10 DIAGNOSIS — E11.9 TYPE 2 DIABETES MELLITUS WITHOUT COMPLICATION, WITHOUT LONG-TERM CURRENT USE OF INSULIN (HCC): ICD-10-CM

## 2022-03-10 LAB
ALBUMIN SERPL-MCNC: 4.6 G/DL (ref 4–5)
ALBUMIN/GLOB SERPL: 1.6 {RATIO} (ref 1.2–2.2)
ALP SERPL-CCNC: 118 IU/L (ref 44–121)
ALT SERPL-CCNC: 41 IU/L (ref 0–44)
AST SERPL-CCNC: 23 IU/L (ref 0–40)
BASOPHILS # BLD AUTO: 0 X10E3/UL (ref 0–0.2)
BASOPHILS NFR BLD AUTO: 1 %
BILIRUB SERPL-MCNC: 0.3 MG/DL (ref 0–1.2)
BUN SERPL-MCNC: 17 MG/DL (ref 6–24)
BUN/CREAT SERPL: 18 (ref 9–20)
CALCIUM SERPL-MCNC: 9.6 MG/DL (ref 8.7–10.2)
CHLORIDE SERPL-SCNC: 103 MMOL/L (ref 96–106)
CO2 SERPL-SCNC: 20 MMOL/L (ref 20–29)
CREAT SERPL-MCNC: 0.92 MG/DL (ref 0.76–1.27)
CRP SERPL-MCNC: <1 MG/L (ref 0–10)
EGFR: 101 ML/MIN/1.73
EOSINOPHIL # BLD AUTO: 0.1 X10E3/UL (ref 0–0.4)
EOSINOPHIL NFR BLD AUTO: 1 %
ERYTHROCYTE [DISTWIDTH] IN BLOOD BY AUTOMATED COUNT: 12.1 % (ref 11.6–15.4)
ERYTHROCYTE [SEDIMENTATION RATE] IN BLOOD BY WESTERGREN METHOD: 2 MM/HR (ref 0–30)
GLOBULIN SER CALC-MCNC: 2.9 G/DL (ref 1.5–4.5)
GLUCOSE SERPL-MCNC: 191 MG/DL (ref 65–99)
HCT VFR BLD AUTO: 49.7 % (ref 37.5–51)
HGB BLD-MCNC: 16.6 G/DL (ref 13–17.7)
IMM GRANULOCYTES # BLD AUTO: 0 X10E3/UL (ref 0–0.1)
IMM GRANULOCYTES NFR BLD AUTO: 0 %
LYMPHOCYTES # BLD AUTO: 2.4 X10E3/UL (ref 0.7–3.1)
LYMPHOCYTES NFR BLD AUTO: 29 %
MCH RBC QN AUTO: 28.8 PG (ref 26.6–33)
MCHC RBC AUTO-ENTMCNC: 33.4 G/DL (ref 31.5–35.7)
MCV RBC AUTO: 86 FL (ref 79–97)
MONOCYTES # BLD AUTO: 0.7 X10E3/UL (ref 0.1–0.9)
MONOCYTES NFR BLD AUTO: 9 %
NEUTROPHILS # BLD AUTO: 5 X10E3/UL (ref 1.4–7)
NEUTROPHILS NFR BLD AUTO: 60 %
PLATELET # BLD AUTO: 264 X10E3/UL (ref 150–450)
POTASSIUM SERPL-SCNC: 4.2 MMOL/L (ref 3.5–5.2)
PROT SERPL-MCNC: 7.5 G/DL (ref 6–8.5)
RBC # BLD AUTO: 5.76 X10E6/UL (ref 4.14–5.8)
SODIUM SERPL-SCNC: 141 MMOL/L (ref 134–144)
WBC # BLD AUTO: 8.3 X10E3/UL (ref 3.4–10.8)

## 2022-03-10 RX ORDER — METFORMIN HYDROCHLORIDE 500 MG/1
500 TABLET, EXTENDED RELEASE ORAL 2 TIMES DAILY WITH MEALS
Qty: 180 TABLET | Refills: 1 | Status: SHIPPED | OUTPATIENT
Start: 2022-03-10 | End: 2022-03-16 | Stop reason: SDUPTHER

## 2022-03-10 RX ORDER — GLIMEPIRIDE 4 MG/1
4 TABLET ORAL
Qty: 90 TABLET | Refills: 1 | Status: SHIPPED | OUTPATIENT
Start: 2022-03-10

## 2022-03-10 RX ORDER — SITAGLIPTIN 100 MG/1
100 TABLET, FILM COATED ORAL DAILY
Qty: 90 TABLET | Refills: 1 | Status: SHIPPED | OUTPATIENT
Start: 2022-03-10 | End: 2022-03-16 | Stop reason: SDUPTHER

## 2022-03-10 NOTE — TELEPHONE ENCOUNTER
Patient call stating he is out of all his diabetes medications. Please send asap. (The Jardiance 25mg should have a refill available- 12/16/21 90 + 1)    Last Visit: 12/16/21 NP Milton Short  Next Appointment: 3/16/22 [facility]  Previous Refill Encounter(s):   Glimepiride 4/5/21 90 + 1   Metformin er 4/5/21 180 + 1  Januvia  - Historical provider    Requested Prescriptions     Pending Prescriptions Disp Refills    glimepiride (AMARYL) 4 mg tablet 90 Tablet 1     Sig: Take 1 Tablet by mouth every morning.  metFORMIN ER (GLUCOPHAGE XR) 500 mg tablet 180 Tablet 1     Sig: Take 1 Tablet by mouth two (2) times daily (with meals).  Januvia 100 mg tablet 90 Tablet 1     Sig: Take 1 Tablet by mouth daily.

## 2022-03-16 ENCOUNTER — OFFICE VISIT (OUTPATIENT)
Dept: FAMILY MEDICINE CLINIC | Age: 52
End: 2022-03-16
Payer: COMMERCIAL

## 2022-03-16 VITALS
OXYGEN SATURATION: 96 % | BODY MASS INDEX: 33.3 KG/M2 | HEART RATE: 88 BPM | DIASTOLIC BLOOD PRESSURE: 72 MMHG | WEIGHT: 224.8 LBS | HEIGHT: 69 IN | RESPIRATION RATE: 16 BRPM | SYSTOLIC BLOOD PRESSURE: 108 MMHG | TEMPERATURE: 97.7 F

## 2022-03-16 DIAGNOSIS — E11.65 UNCONTROLLED TYPE 2 DIABETES MELLITUS WITH HYPERGLYCEMIA (HCC): Primary | ICD-10-CM

## 2022-03-16 DIAGNOSIS — G47.9 SLEEP DISTURBANCE: ICD-10-CM

## 2022-03-16 LAB — HBA1C MFR BLD HPLC: 8.3 %

## 2022-03-16 PROCEDURE — 99214 OFFICE O/P EST MOD 30 MIN: CPT | Performed by: NURSE PRACTITIONER

## 2022-03-16 PROCEDURE — 3052F HG A1C>EQUAL 8.0%<EQUAL 9.0%: CPT | Performed by: NURSE PRACTITIONER

## 2022-03-16 PROCEDURE — 83036 HEMOGLOBIN GLYCOSYLATED A1C: CPT | Performed by: NURSE PRACTITIONER

## 2022-03-16 RX ORDER — SITAGLIPTIN 100 MG/1
100 TABLET, FILM COATED ORAL DAILY
Qty: 90 TABLET | Refills: 1 | Status: SHIPPED | OUTPATIENT
Start: 2022-03-16 | End: 2022-04-05

## 2022-03-16 RX ORDER — METFORMIN HYDROCHLORIDE 500 MG/1
1000 TABLET, EXTENDED RELEASE ORAL 2 TIMES DAILY WITH MEALS
Qty: 180 TABLET | Refills: 1 | Status: SHIPPED | OUTPATIENT
Start: 2022-03-16

## 2022-03-16 RX ORDER — HYDROXYZINE 50 MG/1
50 TABLET, FILM COATED ORAL
Qty: 30 TABLET | Refills: 1 | Status: SHIPPED | OUTPATIENT
Start: 2022-03-16 | End: 2022-05-26

## 2022-03-16 NOTE — PATIENT INSTRUCTIONS
Learning About Diabetes and Exercise  Can you exercise if you have diabetes? When you have diabetes, it's important to get regular exercise. It can help you manage your blood sugar level. You can still play sports, run, ride a bike, swim, and do other activities when you have diabetes. How does exercise help when you have diabetes? Getting regular exercise can help control your blood sugar. Your body turns the food you eat into glucose, a type of sugar. You need this sugar for energy. When you have diabetes, the sugar builds up in your blood. But when you exercise, your body uses sugar. This helps keep it from building up in your blood and results in lower blood sugar and better control of diabetes. Exercise may help you in other ways too. It can help you reach and stay at a healthy weight. It also helps improve blood pressure and cholesterol, which can reduce the risk of heart disease. Exercise can make you feel stronger and happier. It can help you relax and sleep better. And it can give you confidence in other things you do. Exercising safely when you have diabetes  Before you start a new exercise program, talk to your doctor about how and when to exercise. Some types of exercise can be harmful if your diabetes is causing other problems, such as problems with your feet. Your doctor can tell you what types of exercise are good choices for you. Here are some general safety tips. · Take steps to avoid blood sugar problems. ? Check your blood sugar before and after you exercise. ? Ask your doctor what blood sugar range is safe for you when you exercise. ? If you take medicine or insulin that lowers blood sugar, check your blood sugar before you exercise. ? If your blood sugar is less than 90 mg/dL, you may need to eat a carbohydrate snack first.  ? Be careful when you exercise if your blood sugar is too high. Make sure to drink plenty of water. · Try to exercise at about the same time each day. This may help keep your blood sugar steady. If you want to exercise more, slowly increase how hard or long you exercise. · Have someone with you when you exercise. Or exercise at a gym. You may need help if your blood sugar drops too low. · Keep some quick-sugar food with you. You may get symptoms of low blood sugar during exercise or up to 24 hours later. · Use proper footwear and the right equipment. · Pay attention to your body. If you are used to exercising and notice that you cannot do as much as usual, talk to your doctor. Follow-up care is a key part of your treatment and safety. Be sure to make and go to all appointments, and call your doctor if you are having problems. It's also a good idea to know your test results and keep a list of the medicines you take. Where can you learn more? Go to http://www.gray.com/  Enter C492 in the search box to learn more about \"Learning About Diabetes and Exercise. \"  Current as of: July 28, 2021               Content Version: 13.2  © 2006-2022 [employer]. Care instructions adapted under license by [employer] (which disclaims liability or warranty for this information). If you have questions about a medical condition or this instruction, always ask your healthcare professional. [address] any warranty or liability for your use of this information.

## 2022-03-16 NOTE — PROGRESS NOTES
Chief Complaint   Patient presents with    Follow-up     diabetes    Sleep Problem     trouble staying asleep, waking up to arm pain         1. \"Have you been to the ER, urgent care clinic since your last visit? Hospitalized since your last visit? \" No    2. \"Have you seen or consulted any other health care providers outside of the [address] [place] [address] since your last visit? \" Yes When: 2022 Where: dermatologist Reason for visit: hair loss     3. For patients over 45: Has the patient had a colonoscopy?  Yes - no Care Gap present     3 most recent PHQ Screens 3/9/2022   Little interest or pleasure in doing things Not at all   Feeling down, depressed, irritable, or hopeless Not at all   Total Score PHQ 2 0       Health Maintenance Due   Topic Date Due    DTaP/Tdap/Td series (1 - Tdap) Never done    Eye Exam Retinal or Dilated  04/17/2014    COVID-19 Vaccine (3 - Booster for Pfizer series) 10/03/2021    Shingrix Vaccine Age 50> (1 of 2) Never done    Foot Exam Q1  03/31/2022    MICROALBUMIN Q1  03/31/2022

## 2022-03-16 NOTE — PROGRESS NOTES
[facility] Note     Lawrence Pérez (: 1970) is a 49 y.o. male, established patient, here for evaluation of the following chief complaint(s):  Follow-up (diabetes) and Sleep Problem (trouble staying asleep, waking up to arm pain)       ASSESSMENT/PLAN:  1. Uncontrolled type 2 diabetes mellitus with hyperglycemia (HCC)  -     AMB POC HEMOGLOBIN A1C, Discussed result(s) with patient at the time of encounter. Lab Results   Component Value Date/Time    Hemoglobin A1c 7.6 (H) 2021 12:16 PM    Hemoglobin A1c (POC) 8.3 2022 04:15 PM     -     empagliflozin (JARDIANCE) 25 mg tablet; Take 1 Tablet by mouth daily. , Normal, Disp-90 Tablet, R-1  -     Januvia 100 mg tablet; Take 1 Tablet by mouth daily. , Normal, Disp-90 Tablet, R-1, ROBBI  -     metFORMIN ER (GLUCOPHAGE XR) 500 mg tablet; Take 2 Tablets by mouth two (2) times daily (with meals). , Normal, Disp-180 Tablet, R-1DX Code Needed  . -      DIABETES FOOT EXAM  -Requested patient schedule eye exam  -     semaglutide (OZEMPIC) 0.25 mg or 0.5 mg/dose (2 mg/1.5 ml) subq pen; 0.25mg SQ Q7days x 4 weeks then increase to 0.5mg x 4 wks, DX: E11.65, Normal, Disp-1 Box, R-1, discussed with patient prescription will be submitted however there is always a chance that this medication may not be on formulary if there is insurance. Should this be the case we will work to have this medication preauthorized for work to find one that is under his insurance plan. Ozempic handout provided to patient on use as well as educational handout on GLP-1's. Referral to clinical pharmacist will provided for uncontrolled diabetes, medication education and use of injectable medications  -     REFERRAL TO PHARMACIST    2. Sleep disturbance  -     hydrOXYzine HCL (ATARAX) 50 mg tablet;  Take 1 Tablet by mouth nightly as needed for Sleep., Normal, Disp-30 Tablet, R-1  - May continue trazodone       Return in about 1 month (around 2022), or if symptoms worsen or fail to improve. On this date 03/16/2022 I have spent 35 minutes reviewing previous notes, test results and face to face with the patient discussing the diagnosis and importance of compliance with the treatment plan as well as documenting on the day of the visit. SUBJECTIVE/OBJECTIVE:    Leroy Flynn is a 49 y.o. male seen today for  DM. Cardiovascular Review:  He has diabetes, hypertension and hyperlipidemia. Diet and Lifestyle: generally follows a low fat low cholesterol diet, exercises sporadically  Home BP Monitoring: is not measured at home. Pertinent ROS: taking medications as instructed, no medication side effects noted, no TIA's, no chest pain on exertion, no dyspnea on exertion, no swelling of ankles. Diabetes Mellitus:  Diabetic ROS - medication compliance: all the time, ran out of , diabetic diet compliance: noncompliant some of the time, home glucose monitoring: is performed regularl ranging -. 115-200  Further diabetic ROS: no polyuria or polydipsia, no chest pain, dyspnea or TIA's, no numbness, tingling or pain in extremities. REVIEW OF SYSTEMS:    Review of Systems   Constitutional: Negative. HENT: Negative. Respiratory: Negative. Cardiovascular: Negative. Gastrointestinal: Negative. Genitourinary: Negative. Musculoskeletal: Positive for arthralgias. Negative for gait problem. Skin: Negative. Neurological: Negative.           VITAL SIGNS:    Wt Readings from Last 3 Encounters:   03/16/22 224 lb 12.8 oz (102 kg)   03/09/22 224 lb (101.6 kg)   03/07/22 223 lb (101.2 kg)     Temp Readings from Last 3 Encounters:   03/16/22 97.7 °F (36.5 °C) (Temporal)   03/09/22 97.6 °F (36.4 °C)   12/16/21 97.3 °F (36.3 °C) (Temporal)     BP Readings from Last 3 Encounters:   03/16/22 108/72   03/09/22 108/75   03/07/22 130/80     Pulse Readings from Last 3 Encounters:   03/16/22 88   03/09/22 81   03/07/22 96           PHYSICAL EXAMINATION: General: Alert, cooperative, no distress  Respiratory: Breathing comfortably, in no acute respiratory distress. Clear to auscultation bilaterally. Cardiovascular: Regular rate and rhythm, S1, S2 normal, no murmur, click, rub or gallop. Extremities: no edema. Abdomen: Soft, non-tender, not distended. Bowel sounds normal. No masses or organomegaly. MSK: Extremities normal appearing, atraumatic, no effusion. Gait steady and unassisted. Skin: Skin color, texture, turgor normal. No rashes or lesions on exposed skin. Neurologic: A/Ox3  Psychiatric: Normal affect. Mood euthymic. Thoughts logical. Speech volume and speed normal    Diabetic foot exam:     Left Foot:   Visual Exam: normal    Pulse DP: 2+ (normal)   Filament test: normal sensation          Right Foot:   Visual Exam: normal    Pulse DP: 2+ (normal)   Filament test: normal sensation                  Treatment risks/benefits/costs/interactions/alternatives discussed with patient. Advised patient to call back or return to office if symptoms worsen/change/persist. If patient cannot reach us or should anything more severe/urgent arise he/she should proceed directly to the nearest emergency department. Discussed expected course/resolution/complications of diagnosis in detail with patient. Patient expressed understanding with the diagnosis and plan. An electronic signature was used to authenticate this note.   -- Luis Pavon NP

## 2022-03-18 PROBLEM — Z72.0 TOBACCO USE: Status: ACTIVE | Noted: 2019-05-29

## 2022-03-18 PROBLEM — E11.65 UNCONTROLLED TYPE 2 DIABETES MELLITUS WITH HYPERGLYCEMIA (HCC): Status: ACTIVE | Noted: 2022-03-18

## 2022-03-18 PROBLEM — Z79.1 LONG TERM CURRENT USE OF NON-STEROIDAL ANTI-INFLAMMATORIES (NSAID): Status: ACTIVE | Noted: 2019-05-29

## 2022-03-19 PROBLEM — E66.811 CLASS 1 OBESITY DUE TO EXCESS CALORIES WITH SERIOUS COMORBIDITY AND BODY MASS INDEX (BMI) OF 31.0 TO 31.9 IN ADULT: Status: ACTIVE | Noted: 2020-02-04

## 2022-03-19 PROBLEM — E66.09 CLASS 1 OBESITY DUE TO EXCESS CALORIES WITH SERIOUS COMORBIDITY AND BODY MASS INDEX (BMI) OF 31.0 TO 31.9 IN ADULT: Status: ACTIVE | Noted: 2020-02-04

## 2022-03-19 PROBLEM — K21.9 GASTROESOPHAGEAL REFLUX DISEASE: Status: ACTIVE | Noted: 2020-02-04

## 2022-03-19 PROBLEM — M17.0 PRIMARY OSTEOARTHRITIS OF BOTH KNEES: Status: ACTIVE | Noted: 2019-05-29

## 2022-03-24 PROBLEM — E11.65 UNCONTROLLED TYPE 2 DIABETES MELLITUS WITH HYPERGLYCEMIA (HCC): Status: ACTIVE | Noted: 2022-03-18

## 2022-04-01 ENCOUNTER — TELEPHONE (OUTPATIENT)
Dept: FAMILY MEDICINE CLINIC | Age: 52
End: 2022-04-01

## 2022-04-01 DIAGNOSIS — E11.65 UNCONTROLLED TYPE 2 DIABETES MELLITUS WITH HYPERGLYCEMIA (HCC): Primary | ICD-10-CM

## 2022-04-01 RX ORDER — DULAGLUTIDE 0.75 MG/.5ML
0.75 INJECTION, SOLUTION SUBCUTANEOUS
Qty: 4 EACH | Refills: 1 | Status: SHIPPED | OUTPATIENT
Start: 2022-04-01 | End: 2022-05-02 | Stop reason: DRUGHIGH

## 2022-04-01 NOTE — TELEPHONE ENCOUNTER
Pharmacy Progress Note - Telephone Encounter    Mr. Jl Rodrigues 49 y.o. male, referred by Dr. Jane Ga NP, was contacted via an outbound telephone call to discuss scheduling DM visit and PA denial of Ozempic today. Verified patients identifiers (name & ) per HIPAA policy. - Pt recently saw PCP on 3/18/22 for f/up on chronic issues. Pt was noted to be uncontrolled with DM. Ozempic was prescribed. It is not covered by insurance. Trulicity, Victoza, or Byetta are covered. - This writer contacted pt to inform him Ozempic was not covered. - Ozempic was switched for Trulicity (covered). - Contacted insurance to see if PA was required. It was not    - Scheduled pt for Trulicity teaching visit on 22 at 11:30PM.  Instructed to bring med with him if it was available. A/P:  -   - Patient endorses understanding to the provided information. All questions answered at this time. Medications Discontinued During This Encounter   Medication Reason    semaglutide (OZEMPIC) 0.25 mg or 0.5 mg/dose (2 mg/1.5 ml) subq pen Cost of Medication     Orders Placed This Encounter    dulaglutide (Trulicity) 4.92 ZJ/2.8 mL sub-q pen     Si.5 mL by SubCUTAneous route every seven (7) days. Dispense:  4 Each     Refill:  1       Payton Zepeda, PharmD, [facility]  Clinical Pharmacist Specialist      For Pharmacy Admin Tracking Only     CPA in place:  Yes   Recommendation Provided To: Patient/Caregiver: 4 via Telephone and Other: 4   Intervention Detail: Discontinued Rx: 1, reason: Cost/Formulary Change, New Rx: 1, reason: Cost/Formulary Change, Patient Access Assistance/Sample Provided and Scheduled Appointment   Intervention Accepted By: Patient/Caregiver: 4 and Other: 4   Time Spent (min): 30

## 2022-04-05 ENCOUNTER — OFFICE VISIT (OUTPATIENT)
Dept: FAMILY MEDICINE CLINIC | Age: 52
End: 2022-04-05

## 2022-04-05 DIAGNOSIS — E11.65 UNCONTROLLED TYPE 2 DIABETES MELLITUS WITH HYPERGLYCEMIA (HCC): Primary | ICD-10-CM

## 2022-04-06 NOTE — PROGRESS NOTES
Pharmacy Progress Note - Diabetes Management    S/O: Mr. Can Schwartz is a 49 y.o. male, referred by Dr. Kaden La NP, with a PMH of T2DM, GERD, goiter, psoriatic arthritis, OA, HLD, was seen today for diabetes management. Patient's last A1c was 8.3% (March 2022), which has increased from 8.1% (December 2021), which has increased from 7.7% (November 2019). Interim update: Patient had a phone call with PharmD on 04/01/2022 that stated that Ozempic was not covered by insurance, but it was switched to Trulicity and was covered. Patient last saw PCP on 03/18/2022, in which Ozempic 0.25 mg weekly was added to anti-hyperglycemic regimen to obtain glycemic control. Patient presents to visit to obtain Trulicity education and teaching. Medication wasn't brought into visit, but patient concurrently takes a monthly Taltz injection. Thus, he is familiar with the administration technique of Trulicity. Patient is currently celebrating Ramadan and reports that dinner is currently his largest meal of the day. Current anti-hyperglycemic regimen includes:    - Jardiance 25 mg daily  - Glimepiride 4 mg daily  - Metformin ER: 1000 mg twice daily    Self Monitoring Blood Glucose (SMBG):  - Brought in home glucometer/blood glucose log/CGM reader today:  no  - Checks BG: fasting and after dinner  - Fasting SMBGL 130s-160s  - Post-prandial: 200s    Vitals:   Wt Readings from Last 3 Encounters:   03/16/22 224 lb 12.8 oz (102 kg)   03/09/22 224 lb (101.6 kg)   03/07/22 223 lb (101.2 kg)     BP Readings from Last 3 Encounters:   03/16/22 108/72   03/09/22 108/75   03/07/22 130/80     Pulse Readings from Last 3 Encounters:   03/16/22 88   03/09/22 81   03/07/22 96     Past Medical History:   Diagnosis Date    Arthritis     Diabetes ([address] 5/27/2009    Elevated liver enzymes     resolved     Hypercholesterolemia     Psoriasis 5/27/2009    Psoriatic arthritis ([address] 5/27/2009     Allergies   Allergen Reactions    Iodinated Contrast Media Shortness of Breath     Current Outpatient Medications   Medication Sig    dulaglutide (Trulicity) 5.28 AY/7.9 mL sub-q pen 0.5 mL by SubCUTAneous route every seven (7) days.  hydrOXYzine HCL (ATARAX) 50 mg tablet Take 1 Tablet by mouth nightly as needed for Sleep.  empagliflozin (JARDIANCE) 25 mg tablet Take 1 Tablet by mouth daily.  metFORMIN ER (GLUCOPHAGE XR) 500 mg tablet Take 2 Tablets by mouth two (2) times daily (with meals).  glimepiride (AMARYL) 4 mg tablet Take 1 Tablet by mouth every morning.  clobetasoL (TEMOVATE) 0.05 % ointment Apply  to affected area two (2) times a day. Apply to right ankle    celecoxib (CELEBREX) 200 mg capsule Take 1 Capsule by mouth two (2) times daily as needed for Pain.  folic acid (FOLVITE) 1 mg tablet Take 3 Tablets by mouth daily.  lisinopriL (PRINIVIL, ZESTRIL) 2.5 mg tablet Take 2.5 mg by mouth daily.  ketoconazole (NIZORAL) 2 % shampoo WORK INTO SCALP AND ALLOW TO SIT FOR 5 MINUTES BEFORE RINSING THREE TIMES WEEKLY.  finasteride (PROPECIA) 1 mg tablet Do not crush, chew, or split. Take 1 by mouth daily.  propranoloL (INDERAL) 20 mg tablet Take 1 Tablet by mouth daily. (Patient not taking: Reported on 3/9/2022)    omeprazole (PRILOSEC) 40 mg capsule TAKE 1 CAP BY MOUTH DAILY (BEFORE BREAKFAST). (Patient not taking: Reported on 3/9/2022)    ixekizumab (Taltz Autoinjector) 80 mg/mL auto injector 1 mL by SubCUTAneous route every thirty (30) days.  traZODone (DESYREL) 50 mg tablet Take 1 Tablet by mouth nightly as needed for Sleep.  methotrexate 25 mg/mL chemo injection 1 mL by SubCUTAneous route Every Friday.  BD Insulin Syringe Ultra-Fine 1 mL 31 gauge x 5/16 syrg 1 EACH BY DOES NOT APPLY ROUTE EVERY SATURDAY FOR 12 DOSES. TO BE USED FOR METHOTREXATE SOLUTION    atorvastatin (LIPITOR) 40 mg tablet Take 1 Tab by mouth daily.     acetaminophen (TYLENOL) 500 mg tablet TAKE 1 TO 2 TABLETS BY MOUTH 3 TIMES A DAY AS NEEDED FOR PAIN     No current facility-administered medications for this visit. Lab Results   Component Value Date/Time    Sodium 141 03/09/2022 12:00 AM    Potassium 4.2 03/09/2022 12:00 AM    Chloride 103 03/09/2022 12:00 AM    CO2 20 03/09/2022 12:00 AM    Anion gap 7 10/12/2021 08:40 AM    Glucose 191 (H) 03/09/2022 12:00 AM    BUN 17 03/09/2022 12:00 AM    Creatinine 0.92 03/09/2022 12:00 AM    BUN/Creatinine ratio 18 03/09/2022 12:00 AM    GFR est AA >60 10/12/2021 08:40 AM    GFR est non-AA >60 10/12/2021 08:40 AM    Calcium 9.6 03/09/2022 12:00 AM    Bilirubin, total 0.3 03/09/2022 12:00 AM    Alk. phosphatase 118 03/09/2022 12:00 AM    Protein, total 7.5 03/09/2022 12:00 AM    Albumin 4.6 03/09/2022 12:00 AM    Globulin 4.0 10/12/2021 08:40 AM    A-G Ratio 1.6 03/09/2022 12:00 AM    ALT (SGPT) 41 03/09/2022 12:00 AM     Lab Results   Component Value Date/Time    Cholesterol, total 252 (H) 03/31/2021 12:16 PM    HDL Cholesterol 44 03/31/2021 12:16 PM    LDL, calculated 165.8 (H) 03/31/2021 12:16 PM    VLDL, calculated 42.2 03/31/2021 12:16 PM    Triglyceride 211 (H) 03/31/2021 12:16 PM    CHOL/HDL Ratio 5.7 (H) 03/31/2021 12:16 PM     Lab Results   Component Value Date/Time    WBC 8.3 03/09/2022 12:00 AM    HGB 16.6 03/09/2022 12:00 AM    HCT 49.7 03/09/2022 12:00 AM    PLATELET 103 87/31/8369 12:00 AM    MCV 86 03/09/2022 12:00 AM     Lab Results   Component Value Date/Time    Microalbumin/Creat ratio (mg/g creat) 6 03/31/2021 12:16 PM    Microalbumin,urine random 0.59 03/31/2021 12:16 PM     Lab Results   Component Value Date/Time    Hemoglobin A1c 7.6 (H) 03/31/2021 12:16 PM    Hemoglobin A1c 7.0 (H) 05/16/2017 10:04 AM    Hemoglobin A1c 6.6 (H) 11/11/2015 12:44 PM     Hemoglobin A1c (POC)   Date Value Ref Range Status   03/16/2022 8.3 % Final      CrCl cannot be calculated (Unknown ideal weight. ). A/P:    1) T2DM: Per ADA guidelines, Pt's A1c is not at goal of < 7%.   Current SMBG trend is limited, but are not within goal. Blood glucose levels are elevated primarily after dinner. Patient is on first- and second-line therapy and GLP-1 agonists are the next line. Thus, medication changes are warranted. - Start Trulicity 1.19 mg weekly  - Start taking glimepiride 4 mg daily before dinner  - Stop Januvia 100 mg daily  - Continue metformin ER 1000 mg twice daily  - Continue Jardiance 25 mg daily  - Reviewed Trulicity in detail, how to inject, store, dispose of dosing pen, injection sites, rotation of sites, once weekly - pick day - what to do if miss day, potential side effects and what to do, holding pen in site after injecting to make sure medication is fully injected, expected effects, dose titration, amount of medication in pen, how the medication works. Patient able to demonstrate on sample pen appropriate use    Medication reconciliation was completed during the visit. Medications Discontinued During This Encounter   Medication Reason    Januvia 100 mg tablet LIST CLEANUP     No orders of the defined types were placed in this encounter. Patient verbalized understanding of the information presented and all of the patients questions were answered. AVS was handed to the patient. Patient advised to call the office with any additional questions or concerns. Notifications of recommendations will be sent to Dr. Jacobo Marrero NP for review. Patient will return to clinic in 4 week(s) for follow up.      Yodit Brown, PharmD  PGY1 Pharmacy Resident  [facility]

## 2022-04-09 NOTE — PROGRESS NOTES
Patient was seen with Magno FalconD, PGY1 resident. I was present for the visit and agree with the assessment and plan. Please see their note for more details of the visit. Guillermo Miles PharmD, [facility] [place]  Clinical Pharmacist Specialist      For Pharmacy Admin Tracking Only     CPA in place:  Yes   Recommendation Provided To: Patient/Caregiver: 3 via In person   Intervention Detail: Device Training, Discontinued Rx: 1, reason: Unnecessary Med and Scheduled Appointment   Intervention Accepted By: Patient/Caregiver: 3   Time Spent (min): 30

## 2022-05-02 ENCOUNTER — OFFICE VISIT (OUTPATIENT)
Dept: FAMILY MEDICINE CLINIC | Age: 52
End: 2022-05-02

## 2022-05-02 VITALS
TEMPERATURE: 98.3 F | WEIGHT: 217.4 LBS | BODY MASS INDEX: 32.2 KG/M2 | RESPIRATION RATE: 16 BRPM | SYSTOLIC BLOOD PRESSURE: 101 MMHG | OXYGEN SATURATION: 97 % | HEIGHT: 69 IN | DIASTOLIC BLOOD PRESSURE: 73 MMHG | HEART RATE: 96 BPM

## 2022-05-02 DIAGNOSIS — K21.9 GASTROESOPHAGEAL REFLUX DISEASE, UNSPECIFIED WHETHER ESOPHAGITIS PRESENT: ICD-10-CM

## 2022-05-02 DIAGNOSIS — G89.29 CHRONIC RIGHT SHOULDER PAIN: ICD-10-CM

## 2022-05-02 DIAGNOSIS — E11.65 UNCONTROLLED TYPE 2 DIABETES MELLITUS WITH HYPERGLYCEMIA (HCC): Primary | ICD-10-CM

## 2022-05-02 DIAGNOSIS — M25.511 CHRONIC RIGHT SHOULDER PAIN: ICD-10-CM

## 2022-05-02 DIAGNOSIS — Z86.19 HISTORY OF HELICOBACTER PYLORI INFECTION: ICD-10-CM

## 2022-05-02 DIAGNOSIS — L40.50 PSORIATIC ARTHRITIS (HCC): ICD-10-CM

## 2022-05-02 PROCEDURE — 99214 OFFICE O/P EST MOD 30 MIN: CPT | Performed by: NURSE PRACTITIONER

## 2022-05-02 PROCEDURE — 3052F HG A1C>EQUAL 8.0%<EQUAL 9.0%: CPT | Performed by: NURSE PRACTITIONER

## 2022-05-02 RX ORDER — DULAGLUTIDE 1.5 MG/.5ML
1.5 INJECTION, SOLUTION SUBCUTANEOUS
Qty: 4 EACH | Refills: 1 | Status: SHIPPED | OUTPATIENT
Start: 2022-05-02 | End: 2022-07-20

## 2022-05-02 NOTE — PROGRESS NOTES
Pharmacy Progress Note - Diabetes Management    S/O: Mr. Sherry Britt is a 49 y.o. male, referred by Dr. Curtis Fernando NP, with a PMH of T2DM, GERD, goiter, psoriatic arthritis, OA, HLD, was seen today for diabetes management. Patient's last A1c was 8.3% (March 2022), which has increased from 8.1% (December 2021), which has increased from 7.7% (November 2019). Interim update: Pt was last seen by PCP on 3/14/22 for f/up on chronic issues. Pt's A1c was noted to be uncontrolled. Trulicity was started. Pt was last seen by this writer on 4/5/22 for f/up on DM. Trulicity education was completed and med was started. Pt arrives to clinic today for f/up on Trulicity start. He has completed 4 doses of Trulicity. Experienced some nausea but it has decreased and is tolerable. Denies change in appetite. Pt also notes that he is having finger pain in his right middle finger. This pain also occurs in index and ring finger on this hand. He also complains of right shoulder pain. Complains of feeling anxious when he has to make decision. States his \"heart feels scary\". Saw a cardiologist on 3/7/22 which did not result in anything of note. Pt also stated he experienced nausea and burning - sometimes to the point of vomiting 1-2x per week. This pre-dates the Trulicity. Sx worsen with food, cigarettes, green/black tea. This writer communicated pt's additional complaints to the PCP as pt had PCP appt scheduled right after DM check in.     Current anti-hyperglycemic regimen includes:    - Trulicity 6.24 mg weekly  - Jardiance 25 mg daily  - Glimepiride 4 mg daily  - Metformin ER: 1000 mg twice daily    ROS:  Today, Pt endorses:  - Symptoms of Hyperglycemia: none  - Symptoms of Hypoglycemia: none    Self Monitoring Blood Glucose (SMBG) or CGM:  - Brought in home glucometer/blood glucose log/CGM reader today:  no  PP  mg - 2 hours after meal    Diabetes Health Maintenance:  · ASCVD Risk Factors: Age, Total cholesterol, High LDL, Diabetes and Smoking History  · ASA therapy:  none  · ACE/ARB therapy: Lisinopril 2.5 mg   · Optimized statin therapy: Atorvastatin 40 mg  · The 10-year ASCVD risk score (Cheri Ivey et al., 2013) is: 16.6%    · LDL: 165.8 mg/dL (3/31/21)  · Nicotine dependence: Yes  · Last eye exam: 4/17/12  · Last foot exam: 3/16/22  · Last influenza vaccine: 12/16/21  · Last Pneumovax 23 vaccine: 11/11/15  · Last Prevnar-13 vaccine: none  · Hepatitis B Series: none   · COVID-19 vaccine: 4/12/21, 5/3/21      Vitals: Wt Readings from Last 3 Encounters:   03/16/22 224 lb 12.8 oz (102 kg)   03/09/22 224 lb (101.6 kg)   03/07/22 223 lb (101.2 kg)     BP Readings from Last 3 Encounters:   03/16/22 108/72   03/09/22 108/75   03/07/22 130/80     Pulse Readings from Last 3 Encounters:   03/16/22 88   03/09/22 81   03/07/22 96       Past Medical History:   Diagnosis Date    Arthritis     Diabetes [address] [address] 5/27/2009    Elevated liver enzymes     resolved     Hypercholesterolemia     Psoriasis 5/27/2009    Psoriatic arthritis ([facility] [address] 5/27/2009     Allergies   Allergen Reactions    Iodinated Contrast Media Shortness of Breath       Current Outpatient Medications   Medication Sig    dulaglutide (Trulicity) 5.50 TK/1.3 mL sub-q pen 0.5 mL by SubCUTAneous route every seven (7) days.  hydrOXYzine HCL (ATARAX) 50 mg tablet Take 1 Tablet by mouth nightly as needed for Sleep.  empagliflozin (JARDIANCE) 25 mg tablet Take 1 Tablet by mouth daily.  metFORMIN ER (GLUCOPHAGE XR) 500 mg tablet Take 2 Tablets by mouth two (2) times daily (with meals).  glimepiride (AMARYL) 4 mg tablet Take 1 Tablet by mouth every morning.  clobetasoL (TEMOVATE) 0.05 % ointment Apply  to affected area two (2) times a day. Apply to right ankle    celecoxib (CELEBREX) 200 mg capsule Take 1 Capsule by mouth two (2) times daily as needed for Pain.  folic acid (FOLVITE) 1 mg tablet Take 3 Tablets by mouth daily.     lisinopriL (PRINIVIL, ZESTRIL) 2.5 mg tablet Take 2.5 mg by mouth daily.  ketoconazole (NIZORAL) 2 % shampoo WORK INTO SCALP AND ALLOW TO SIT FOR 5 MINUTES BEFORE RINSING THREE TIMES WEEKLY.  finasteride (PROPECIA) 1 mg tablet Do not crush, chew, or split. Take 1 by mouth daily.  propranoloL (INDERAL) 20 mg tablet Take 1 Tablet by mouth daily. (Patient not taking: Reported on 3/9/2022)    omeprazole (PRILOSEC) 40 mg capsule TAKE 1 CAP BY MOUTH DAILY (BEFORE BREAKFAST). (Patient not taking: Reported on 3/9/2022)    ixekizumab (Taltz Autoinjector) 80 mg/mL auto injector 1 mL by SubCUTAneous route every thirty (30) days.  traZODone (DESYREL) 50 mg tablet Take 1 Tablet by mouth nightly as needed for Sleep.  methotrexate 25 mg/mL chemo injection 1 mL by SubCUTAneous route Every Friday.  BD Insulin Syringe Ultra-Fine 1 mL 31 gauge x 5/16 syrg 1 EACH BY DOES NOT APPLY ROUTE EVERY SATURDAY FOR 12 DOSES. TO BE USED FOR METHOTREXATE SOLUTION    atorvastatin (LIPITOR) 40 mg tablet Take 1 Tab by mouth daily.  acetaminophen (TYLENOL) 500 mg tablet TAKE 1 TO 2 TABLETS BY MOUTH 3 TIMES A DAY AS NEEDED FOR PAIN     No current facility-administered medications for this visit. Lab Results   Component Value Date/Time    Sodium 141 03/09/2022 12:00 AM    Potassium 4.2 03/09/2022 12:00 AM    Chloride 103 03/09/2022 12:00 AM    CO2 20 03/09/2022 12:00 AM    Anion gap 7 10/12/2021 08:40 AM    Glucose 191 (H) 03/09/2022 12:00 AM    BUN 17 03/09/2022 12:00 AM    Creatinine 0.92 03/09/2022 12:00 AM    BUN/Creatinine ratio 18 03/09/2022 12:00 AM    GFR est AA >60 10/12/2021 08:40 AM    GFR est non-AA >60 10/12/2021 08:40 AM    Calcium 9.6 03/09/2022 12:00 AM    Bilirubin, total 0.3 03/09/2022 12:00 AM    Alk.  phosphatase 118 03/09/2022 12:00 AM    Protein, total 7.5 03/09/2022 12:00 AM    Albumin 4.6 03/09/2022 12:00 AM    Globulin 4.0 10/12/2021 08:40 AM    A-G Ratio 1.6 03/09/2022 12:00 AM    ALT (SGPT) 41 2022 12:00 AM     Lab Results   Component Value Date/Time    Cholesterol, total 252 (H) 2021 12:16 PM    HDL Cholesterol 44 2021 12:16 PM    LDL, calculated 165.8 (H) 2021 12:16 PM    VLDL, calculated 42.2 2021 12:16 PM    Triglyceride 211 (H) 2021 12:16 PM    CHOL/HDL Ratio 5.7 (H) 2021 12:16 PM     Lab Results   Component Value Date/Time    WBC 8.3 2022 12:00 AM    HGB 16.6 2022 12:00 AM    HCT 49.7 2022 12:00 AM    PLATELET 735  12:00 AM    MCV 86 2022 12:00 AM       Lab Results   Component Value Date/Time    Microalbumin/Creat ratio (mg/g creat) 6 2021 12:16 PM    Microalbumin,urine random 0.59 2021 12:16 PM       Lab Results   Component Value Date/Time    Hemoglobin A1c 7.6 (H) 2021 12:16 PM    Hemoglobin A1c 7.0 (H) 2017 10:04 AM    Hemoglobin A1c 6.6 (H) 2015 12:44 PM     Hemoglobin A1c (POC)   Date Value Ref Range Status   2022 8.3 % Final        CrCl cannot be calculated (Unknown ideal weight. ). A/P:    1) T2DM: Per ADA guidelines, Pt's A1c is not at goal of < 7%. Current SMBG(s)/CGM trend is unknown as pt does not check BG at home. Tolerated addition of GLP-1 agonist therapy. In office BG rdg was uncontrolled. Will increase Trulicity dose today. Lipid panel due today; however, pt has PCP visit today. Will postpone order for PCP visit in case additional labs are needed. - INCREASE Trulicity to 1.5 mg weekly  - Continue Jardiance 25 mg daily  - Continue Glimepiride 4 mg daily  - Continue Metformin ER: 1000 mg twice daily  - Encouraged to bring BG rdgs to f/up appt  - Provided BG log      Medication reconciliation was completed during the visit.     Medications Discontinued During This Encounter   Medication Reason    dulaglutide (Trulicity) 3.16 CC/1.0 mL sub-q pen DOSE ADJUSTMENT     Orders Placed This Encounter    dulaglutide (Trulicity) 1.5 NI/9.1 mL sub-q pen     Si.5 mL by SubCUTAneous route every seven (7) days. Dispense:  4 Each     Refill:  1       Patient verbalized understanding of the information presented and all of the patients questions were answered. AVS was handed to the patient. Patient advised to call the office with any additional questions or concerns. Notifications of recommendations will be sent to Dr. Shilpa Jessica, TAL for review. Patient will return to clinic in 4 week(s) for follow up. Magno GarciaD, [facility] [place]  Clinical Pharmacist Specialist          For Pharmacy Admin Tracking Only     CPA in place:  Yes   Recommendation Provided To: Patient/Caregiver: 5 via In person   Intervention Detail: Discontinued Rx: 1, reason: Therapy Complete, Dose Adjustment: 1, reason: Therapy Optimization, Lab(s) Ordered, New Rx: 1, reason: Needs Additional Therapy and Scheduled Appointment   Intervention Accepted By: Patient/Caregiver: 5   Time Spent (min): 45

## 2022-05-02 NOTE — PROGRESS NOTES
[facility] Note     Sharyn Shaw (: 1970) is a 49 y.o. male, established patient, here for evaluation of the following chief complaint(s):  Diabetes, Shoulder Pain (right, from neck down into arm and back), and Epigastric Pain       ASSESSMENT/PLAN:  1. Uncontrolled type 2 diabetes mellitus with hyperglycemia ([facility]  - Working with clinical pharmacist  - Trulicity 7.79 mg weekly  - Jardiance 25 mg daily  - Glimepiride 4 mg daily  - Metformin ER: 1000 mg twice daily  - On ACE and statin    Lab Results   Component Value Date/Time    Hemoglobin A1c 7.6 (H) 2021 12:16 PM    Hemoglobin A1c (POC) 8.3 2022 04:15 PM       2. Psoriatic arthritis ([facility].)  Comments:  Managed by specialist / Rheumatology Dr. Deanne Artis    3. Chronic right shoulder pain  -     XR SHOULDER RT AP/LAT MIN 2 V; Future  - Consider PT and Ortho referral upon review of xray  - On Celecbrex  - May alternate with tylenol  - Heat / Ice prn   4. Gastroesophageal reflux disease, unspecified whether esophagitis present w/  History of Helicobacter pylori infection  -     Persistent symptoms. Discussed concern for H. Pylori. Recommended testing to patient as he previously test positive in 2021  - [facility]; Future          Return in about 3 months (around 2022). SUBJECTIVE/OBJECTIVE:    Sharyn Shaw is a 49 y.o. male seen today for DM, GERD and right shoulder pain. Shoulder pain:  Right shoulder pain that has been present for several weeks. The pain increases with ROM activities. No treatments tried at home. Denies injury. Denies numbness or tingling. Cardiovascular Review:  He has diabetes, hypertension, hyperlipidemia and obesity. Diet and Lifestyle: does not rigorously follow a diabetic diet  Home BP Monitoring: is not measured at home.   Pertinent ROS: taking medications as instructed, no medication side effects noted, no TIA's, no chest pain on exertion, no dyspnea on exertion, no swelling of ankles. Diabetes Mellitus:  Diabetic ROS - medication compliance: compliant all of the time, diabetic diet compliance: noncompliant some of the time, home glucose monitoring: is performed sporadically. Further diabetic ROS: no polyuria or polydipsia, no chest pain, dyspnea or TIA's, no numbness, tingling or pain in extremities. REVIEW OF SYSTEMS:    Review of Systems   Constitutional: Negative. HENT: Negative. Respiratory: Negative. Cardiovascular: Positive for palpitations (occasional). Negative for chest pain and leg swelling. Gastrointestinal: Positive for nausea. Negative for anal bleeding, blood in stool, constipation, diarrhea, rectal pain and vomiting. Burning, persistent indigestion    Genitourinary: Negative. Musculoskeletal: Positive for arthralgias (right shoulder). Negative for gait problem and joint swelling. Skin: Negative. Neurological: Negative. VITAL SIGNS:    Wt Readings from Last 3 Encounters:   05/02/22 217 lb 6.4 oz (98.6 kg)   03/16/22 224 lb 12.8 oz (102 kg)   03/09/22 224 lb (101.6 kg)     Temp Readings from Last 3 Encounters:   05/02/22 98.3 °F (36.8 °C) (Temporal)   03/16/22 97.7 °F (36.5 °C) (Temporal)   03/09/22 97.6 °F (36.4 °C)     BP Readings from Last 3 Encounters:   05/02/22 101/73   03/16/22 108/72   03/09/22 108/75     Pulse Readings from Last 3 Encounters:   05/02/22 96   03/16/22 88   03/09/22 81           PHYSICAL EXAMINATION:       General: Alert, cooperative, no distress  Respiratory: Breathing comfortably, in no acute respiratory distress. Clear to auscultation bilaterally. Cardiovascular: Regular rate and rhythm, S1, S2 normal, no murmur, click, rub or gallop. Extremities: no edema. Abdomen: Soft, non-tender, not distended. Bowel sounds normal. No masses or organomegaly. MSK: Extremities normal appearing, atraumatic, no effusion. Gait steady and unassisted.    right shoulder exam:    ROM: full range with pain  Crepitus: NO  Shoulder  is not tender to palpation   NEER test: negative  Bronson test: negative  Cross over test:  negative  Empty Can test:  negative  Stressed ext rotation:  negative  Stressed int rotation:  negative  Bicep tendon: non-tender    Skin: Skin color, texture, turgor normal. No rashes or lesions on exposed skin. Neurologic: A/Ox3  Psychiatric: Normal affect. Mood euthymic. Thoughts logical. Speech volume and speed normal            Treatment risks/benefits/costs/interactions/alternatives discussed with patient. Advised patient to call back or return to office if symptoms worsen/change/persist. If patient cannot reach us or should anything more severe/urgent arise he/she should proceed directly to the nearest emergency department. Discussed expected course/resolution/complications of diagnosis in detail with patient. Patient expressed understanding with the diagnosis and plan. An electronic signature was used to authenticate this note.   -- Nisreen Hutchins NP

## 2022-05-02 NOTE — PATIENT INSTRUCTIONS
Your A1c was 8.3% which was uncontrolled.     INCREASE Trulicity to 1.5 mg weekly    Check your blood sugar at home - fasting and 1-2 hours after a meal.    Blood Sugar Goal  Fastin-130 mg/dL  1-2 after meals: Less than 180 mg/dL    Carbohydrate Goals  Meal: 30-45 grams   Snacks: 15 grams

## 2022-05-02 NOTE — PATIENT INSTRUCTIONS
H. Pylori: About This Test  What is it? H. pylori tests are used to check for a Helicobacter pylori bacteria infection in the stomach and upper part of the small intestine. H. pylori can cause peptic ulcers. But most people with this type of bacteria in their digestive systems do not get ulcers. Different tests may be used to check for an H. pylori infection. · Blood antibody test. This checks to see if your body has made antibodies to fight H. pylori bacteria. · Urea breath test. It tests your breath to see if you have H. pylori bacteria in your stomach. · Stool antigen test. This test looks for substances in your feces (stool) that trigger the immune system to fight an H. pylori infection. (These substances are called H. pylori antigens.)  · Stomach biopsy. A small sample (biopsy) is taken from the lining of your stomach and small intestine. The samples are checked for H. pylori. Why is this test done? H. pylori tests are done to:  · Find out if an infection with H. pylori bacteria may be causing an ulcer or irritation of the stomach lining (gastritis). · Find out if treatment for the infection worked. How do you prepare for the test?  Blood antibody test  · You do not need to do anything before you have this test.  Urea breath test, stool antigen test, or stomach biopsy  · Medicines you take may change the results of these tests. Be sure to tell your doctor about all the prescription and over-the-counter medicines you take. Your doctor may advise you to stop taking some of your medicines. Urea breath test or stomach biopsy  · You will be asked to not eat or drink anything for a certain amount of time before your breath test or stomach biopsy. Follow your doctor's instructions about how long you need to avoid eating and drinking before the test. If you are going to have a stomach biopsy, your doctor will give you instructions on how to prepare. How is the test done?   Blood antibody test  A health professional uses a needle to take a blood sample, usually from the arm. Urea breath test  A breath sample is collected when you blow into a balloon or blow bubbles into a bottle of liquid. The health professional will:  · Collect a sample of your breath before the test starts. · Give you a capsule or some water to swallow that contains tagged or radioactive material.  · Collect more samples of your breath. The samples will be tested to see if they contain material formed when H. pylori comes into contact with the tagged or radioactive material.  Stool antigen test  For this test, you may be asked to collect the stool sample at home. To collect the sample, you need to:  · Pass stool into a dry container. Either solid or liquid stools can be collected. Be careful not to get urine or toilet tissue in with the stool sample. · Replace the container cap. Label the container with your name, your doctor's name, and the date the sample was collected. · Wash your hands well after you collect the sample. · Take the sealed container to your doctor's office or to the lab as soon as you can. Stomach biopsy  A procedure called endoscopy is used to collect samples of tissue from the stomach and the first part of the small intestine. The tissue samples are tested in a lab to see if they contain H. pylori. Follow-up care is a key part of your treatment and safety. Be sure to make and go to all appointments, and call your doctor if you are having problems. It's also a good idea to keep a list of the medicines you take. Ask your doctor when you can expect to have your test results. Where can you learn more? Go to http://www.gray.com/  Enter L549 in the search box to learn more about \"H. Pylori: About This Test.\"  Current as of: July 1, 2021               Content Version: 13.2  © 8176-1204 [employer]    Care instructions adapted under license by [employer] (which disclaims liability or warranty for this information). If you have questions about a medical condition or this instruction, always ask your healthcare professional. [place] [address] any warranty or liability for your use of this information.

## 2022-05-02 NOTE — PROGRESS NOTES
Chief Complaint   Patient presents with    Diabetes    Shoulder Pain     right, from neck down into arm and back    Epigastric Pain         1. \"Have you been to the ER, urgent care clinic since your last visit? Hospitalized since your last visit? \" No    2. \"Have you seen or consulted any other health care providers outside of the [address] [place] [address] since your last visit? \" No     3. For patients over 45: Has the patient had a colonoscopy?  Yes - no Care Gap present     3 most recent PHQ Screens 5/2/2022   Little interest or pleasure in doing things -   Feeling down, depressed, irritable, or hopeless Several days   Total Score PHQ 2 -       Health Maintenance Due   Topic Date Due    DTaP/Tdap/Td series (1 - Tdap) Never done    Eye Exam Retinal or Dilated  04/17/2014    Pneumococcal 0-64 years (2 - PCV) 11/11/2016    COVID-19 Vaccine (3 - Booster for Pfizer series) 10/03/2021    Shingrix Vaccine Age 50> (1 of 2) Never done    MICROALBUMIN Q1  03/31/2022    Lipid Screen  03/31/2022

## 2022-05-26 ENCOUNTER — VIRTUAL VISIT (OUTPATIENT)
Dept: FAMILY MEDICINE CLINIC | Age: 52
End: 2022-05-26
Payer: COMMERCIAL

## 2022-05-26 DIAGNOSIS — G44.219 EPISODIC TENSION-TYPE HEADACHE, NOT INTRACTABLE: ICD-10-CM

## 2022-05-26 DIAGNOSIS — Z20.822 SUSPECTED COVID-19 VIRUS INFECTION: Primary | ICD-10-CM

## 2022-05-26 PROCEDURE — 99212 OFFICE O/P EST SF 10 MIN: CPT | Performed by: NURSE PRACTITIONER

## 2022-05-26 RX ORDER — LOPERAMIDE HCL 2 MG
2 TABLET ORAL
Qty: 10 TABLET | Refills: 0 | Status: SHIPPED | OUTPATIENT
Start: 2022-05-26 | End: 2022-05-29

## 2022-05-26 RX ORDER — ADHESIVE TAPE 1" X 10 YD
1 TAPE, NON-MEDICATED TOPICAL DAILY
Qty: 30 CAPSULE | Refills: 0 | Status: SHIPPED | OUTPATIENT
Start: 2022-05-26 | End: 2022-10-10

## 2022-05-26 RX ORDER — PROMETHAZINE HYDROCHLORIDE 25 MG/1
25 TABLET ORAL
Qty: 10 TABLET | Refills: 1 | Status: SHIPPED | OUTPATIENT
Start: 2022-05-26 | End: 2022-10-10

## 2022-05-26 RX ORDER — BUTALBITAL, ACETAMINOPHEN AND CAFFEINE 50; 325; 40 MG/1; MG/1; MG/1
1 TABLET ORAL
Qty: 10 TABLET | Refills: 0 | Status: SHIPPED | OUTPATIENT
Start: 2022-05-26 | End: 2022-10-10

## 2022-05-26 NOTE — PROGRESS NOTES
[facility] Note  Radha Anderson is a 49 y.o. male who was seen by synchronous (real-time) audio-video technology on 5/26/2022 for Concern For COVID-19 (Coronavirus)        Assessment & Plan:   Diagnoses and all orders for this visit:    1. Suspected COVID-19 virus infection  -     L.acid,mg,rham-B.bifid,long (Digestive Probiotic) 3 billion cell cap; Take 1 Capsule by mouth daily. -     loperamide (IMMODIUM) 2 mg tablet; Take 1 Tablet by mouth four (4) times daily as needed for Diarrhea for up to 3 days. -     promethazine (PHENERGAN) 25 mg tablet; Take 1 Tablet by mouth every six (6) hours as needed for Nausea. - Supportive care  -Recommended COVID testing as he has been exposed by family members and is immunosuppressed. I would consider Paxlovid as an option should he test positive. The patient plans to go to local [facility] for testing and will contact our office once completed. 2. Episodic tension-type headache, not intractable  -     butalbital-acetaminophen-caffeine (FIORICET, ESGIC) -40 mg per tablet; Take 1 Tablet by mouth every six (6) hours as needed for Headache. Follow-up and Dispositions    · Return if symptoms worsen or fail to improve. I spent at least 12 minutes on this visit with this established patient. Subjective:     Rl Rodrigues is a 49 y.o.  male seen for suspected COVID. The patient has had exposure to [place] family members in the home. He believes himself also has COVID but has not proceeded with testing. Complains of generalized malaise, headache, feeling hot, sore throat nausea, stomachache and diarrhea. Describes headache as bandlike around the head. Denies new neurological symptoms or focal deficits. Prior to Admission medications    Medication Sig Start Date End Date Taking? Authorizing Provider   L.acid,mg,rham-B.bifid,long (Digestive Probiotic) 3 billion cell cap Take 1 Capsule by mouth daily.  5/26/22  Yes Gerardo Alfaro NP   loperamide (IMMODIUM) 2 mg tablet Take 1 Tablet by mouth four (4) times daily as needed for Diarrhea for up to 3 days. 5/26/22 5/29/22 Yes Belkis Diallo NP   promethazine (PHENERGAN) 25 mg tablet Take 1 Tablet by mouth every six (6) hours as needed for Nausea. 5/26/22  Yes Belkis Diallo NP   butalbital-acetaminophen-caffeine (FIORICET, ESGIC) -40 mg per tablet Take 1 Tablet by mouth every six (6) hours as needed for Headache. 5/26/22  Yes Belkis Diallo NP   metFORMIN ER (GLUCOPHAGE XR) 500 mg tablet Take 2 Tablets by mouth two (2) times daily (with meals). 3/16/22  Yes Belkis Diallo NP   glimepiride (AMARYL) 4 mg tablet Take 1 Tablet by mouth every morning. 3/10/22  Yes Belkis Diallo NP   lisinopriL (PRINIVIL, ZESTRIL) 2.5 mg tablet Take 2.5 mg by mouth daily. 1/11/22  Yes Provider, Barrera   ixekizumab Karen Raid Autoinjector) 80 mg/mL auto injector 1 mL by SubCUTAneous route every thirty (30) days. 1/6/22  Yes Frandy Dasilva MD   traZODone (DESYREL) 50 mg tablet Take 1 Tablet by mouth nightly as needed for Sleep. 12/16/21  Yes Gerardo Alfaro NP   methotrexate 25 mg/mL chemo injection 1 mL by SubCUTAneous route Every Friday. 12/3/21  Yes Frandy Dasilva MD   dulaglutide (Trulicity) 1.5 CE/1.7 mL sub-q pen 0.5 mL by SubCUTAneous route every seven (7) days. 5/2/22   Gerardo Alfaro NP   empagliflozin (JARDIANCE) 25 mg tablet Take 1 Tablet by mouth daily. 3/16/22   Gerardo Alfaro NP   clobetasoL (TEMOVATE) 0.05 % ointment Apply  to affected area two (2) times a day. Apply to right ankle  Patient not taking: Reported on 5/2/2022 3/9/22   Micky Stevenson MD   celecoxib (CELEBREX) 200 mg capsule Take 1 Capsule by mouth two (2) times daily as needed for Pain. 3/9/22   Micky Stevenson MD   folic acid (FOLVITE) 1 mg tablet Take 3 Tablets by mouth daily.  3/9/22   Micky Stevenson MD   ketoconazole (NIZORAL) 2 % shampoo WORK INTO SCALP AND ALLOW TO SIT FOR 5 MINUTES BEFORE RINSING THREE TIMES WEEKLY. Patient not taking: Reported on 5/2/2022 3/1/22   Provider, Historical   finasteride (PROPECIA) 1 mg tablet Do not crush, chew, or split. Take 1 by mouth daily. 3/1/22   Provider, Historical   propranoloL (INDERAL) 20 mg tablet Take 1 Tablet by mouth daily. Patient not taking: Reported on 3/9/2022 3/7/22   Claudia Urbina MD   omeprazole (PRILOSEC) 40 mg capsule TAKE 1 CAP BY MOUTH DAILY (BEFORE BREAKFAST). Patient not taking: Reported on 3/9/2022 3/6/22   Valentino Marinelli NP   BD Insulin Syringe Ultra-Fine 1 mL 31 gauge x 5/16 syrg 1 EACH BY DOES NOT APPLY ROUTE EVERY SATURDAY FOR 12 DOSES. TO BE USED FOR METHOTREXATE SOLUTION 7/8/21   Gabino Jaramillo MD   atorvastatin (LIPITOR) 40 mg tablet Take 1 Tab by mouth daily. 4/5/21   Ermias Newton MD   acetaminophen (TYLENOL) 500 mg tablet TAKE 1 TO 2 TABLETS BY MOUTH 3 TIMES A DAY AS NEEDED FOR PAIN 6/13/20   Ermias Newton MD         Review of Systems   Constitutional: Positive for malaise/fatigue. Negative for fever. HENT: Positive for congestion and sore throat. Respiratory: Negative. Cardiovascular: Negative. Gastrointestinal: Positive for abdominal pain, diarrhea and nausea. Negative for blood in stool, constipation and vomiting. Musculoskeletal: Negative. Skin: Negative. Neurological: Positive for headaches. Negative for dizziness, sensory change, focal weakness and loss of consciousness. Objective:   No flowsheet data found.      [INSTRUCTIONS:  \"[x]\" Indicates a positive item  \"[]\" Indicates a negative item  -- DELETE ALL ITEMS NOT EXAMINED]    Constitutional: [x] Appears well-developed and well-nourished [x] No apparent distress      [] Abnormal -     Mental status: [x] Alert and awake  [x] Oriented to person/place/time [x] Able to follow commands    [] Abnormal -     Eyes:   EOM    [x]  Normal    [] Abnormal -   Sclera  [x]  Normal    [] Abnormal -          Discharge [x]  None visible   [] Abnormal -     HENT: [x] Normocephalic, atraumatic  [] Abnormal -   [x] Mouth/Throat: Mucous membranes are moist    External Ears [x] Normal  [] Abnormal -    Neck: [x] No visualized mass [] Abnormal -     Pulmonary/Chest: [x] Respiratory effort normal   [x] No visualized signs of difficulty breathing or respiratory distress        [] Abnormal -      Musculoskeletal:   [x] Normal gait with no signs of ataxia         [x] Normal range of motion of neck        [] Abnormal -     Neurological:        [x] No Facial Asymmetry (Cranial nerve 7 motor function) (limited exam due to video visit)          [x] No gaze palsy        [] Abnormal -          Skin:        [x] No significant exanthematous lesions or discoloration noted on facial skin         [] Abnormal -            Psychiatric:       [x] Normal Affect [] Abnormal -        [x] No Hallucinations    Other pertinent observable physical exam findings:-        We discussed the expected course, resolution and complications of the diagnosis(es) in detail. Medication risks, benefits, costs, interactions, and alternatives were discussed as indicated. I advised him to contact the office if his condition worsens, changes or fails to improve as anticipated. He expressed understanding with the diagnosis(es) and plan. Rl Rodrigues, was evaluated through a synchronous (real-time) audio-video encounter. The patient (or guardian if applicable) is aware that this is a billable service, which includes applicable co-pays. This Virtual Visit was conducted with patient's (and/or legal guardian's) consent. The visit was conducted pursuant to the emergency declaration under the [facility], [address] [place] [address] authority and the [employer] and [employer] General Act. Patient identification was verified, and a caregiver was present when appropriate.   The patient was located at: Home: [address]  The provider was located at:  Facility (Appt Department): [address] [facility] [place]

## 2022-05-27 NOTE — PATIENT INSTRUCTIONS
9 Things To Do If You've Been Exposed to COVID-19    Stay home. If you've been exposed to the virus but don't have symptoms, you may need to stay in quarantine for 10 full days. But if you are fully vaccinated and boosted, or tested positive for the COVID virus in the last 90 days and have recovered, you may not need to quarantine. Ask your doctor. Call your doctor. Call your doctor or other health professional as soon as you can to let them know that you've been exposed. They may have you take a medicine to keep you from getting seriously ill. Wear a mask when you are around other people for at least 10 days. And don't visit people at high risk for serious illness from COVID for at least 10 days. Limit contact with people in your home. If possible, stay in a separate bedroom and use a separate bathroom. Avoid contact with pets and other animals. Cover your mouth and nose with a tissue when you cough or sneeze. Then throw it in the trash right away. Wash your hands often, especially after you cough or sneeze. Use soap and water, and scrub for at least 20 seconds. If soap and water aren't available, use an alcohol-based hand . Don't share personal household items. These include bedding, towels, cups and glasses, and eating utensils. Clean and disinfect your home every day. Use household  or disinfectant wipes or sprays. If you have questions about COVID-19 testing, ask your doctor or go to cdc.gov to use the COVID-19 Viral Testing Tool. Current as of: March 26, 2021               Content Version: 13.2  © 2006-2022 [employer]. Care instructions adapted under license by [employer] (which disclaims liability or warranty for this information).  If you have questions about a medical condition or this instruction, always ask your healthcare professional. [address]  any warranty or liability for your use of this information.

## 2022-07-20 DIAGNOSIS — E11.65 UNCONTROLLED TYPE 2 DIABETES MELLITUS WITH HYPERGLYCEMIA (HCC): ICD-10-CM

## 2022-07-20 RX ORDER — DULAGLUTIDE 1.5 MG/.5ML
INJECTION, SOLUTION SUBCUTANEOUS
Qty: 4 EACH | Refills: 1 | Status: SHIPPED | OUTPATIENT
Start: 2022-07-20 | End: 2022-09-21

## 2022-08-03 ENCOUNTER — OFFICE VISIT (OUTPATIENT)
Dept: FAMILY MEDICINE CLINIC | Age: 52
End: 2022-08-03
Payer: COMMERCIAL

## 2022-08-03 VITALS
BODY MASS INDEX: 32.41 KG/M2 | RESPIRATION RATE: 16 BRPM | WEIGHT: 218.8 LBS | OXYGEN SATURATION: 96 % | DIASTOLIC BLOOD PRESSURE: 75 MMHG | HEIGHT: 69 IN | HEART RATE: 92 BPM | TEMPERATURE: 98.3 F | SYSTOLIC BLOOD PRESSURE: 109 MMHG

## 2022-08-03 DIAGNOSIS — S16.1XXA STRAIN OF NECK MUSCLE, INITIAL ENCOUNTER: ICD-10-CM

## 2022-08-03 DIAGNOSIS — E11.65 UNCONTROLLED TYPE 2 DIABETES MELLITUS WITH HYPERGLYCEMIA (HCC): Primary | ICD-10-CM

## 2022-08-03 DIAGNOSIS — T59.891A INHALATION OF CLEANING AGENT, ACCIDENTAL OR UNINTENTIONAL, INITIAL ENCOUNTER: ICD-10-CM

## 2022-08-03 LAB — HBA1C MFR BLD HPLC: 7.3 %

## 2022-08-03 PROCEDURE — 99214 OFFICE O/P EST MOD 30 MIN: CPT | Performed by: NURSE PRACTITIONER

## 2022-08-03 PROCEDURE — 83036 HEMOGLOBIN GLYCOSYLATED A1C: CPT | Performed by: NURSE PRACTITIONER

## 2022-08-03 PROCEDURE — 3051F HG A1C>EQUAL 7.0%<8.0%: CPT | Performed by: NURSE PRACTITIONER

## 2022-08-03 RX ORDER — MINERAL OIL
180 ENEMA (ML) RECTAL DAILY
Qty: 30 TABLET | Refills: 1 | Status: SHIPPED | OUTPATIENT
Start: 2022-08-03 | End: 2022-10-10

## 2022-08-03 NOTE — PROGRESS NOTES
[facility] Note     Dawit Rea (: 1970) is a 49 y.o. male, established patient, here for evaluation of the following chief complaint(s):  Diabetes       ASSESSMENT/PLAN:  1. Uncontrolled type 2 diabetes mellitus with hyperglycemia (HCC)  -     AMB POC HEMOGLOBIN A1C, Discussed result(s) with patient at the time of encounter. Lab Results   Component Value Date/Time    Hemoglobin A1c 7.6 (H) 2021 12:16 PM    Hemoglobin A1c (POC) 7.3 2022 03:53 PM     -     MICROALBUMIN, UR, RAND W/ MICROALB/CREAT RATIO; Future  -     LIPID PANEL; Future  - No medication changes    2. Inhalation of cleaning agent, accidental or unintentional, initial encounter  - Allow ventilation  - Avoid contact with irritant    3. Strain of neck muscle, initial encounter  - NSAIDS  - heat prn  - Neck stretch handout provided  -Counseling provided on when to seek emergent care      Return in about 3 months (around 11/3/2022). SUBJECTIVE/OBJECTIVE:    Dawit Rea is a 49 y.o. male seen today for DM, neck strain and cough. Cardiovascular Review:  He has diabetes, hypertension, and hyperlipidemia. Diet and Lifestyle: generally follows a low fat low cholesterol diet, exercises sporadically  Home BP Monitoring: is not measured at home. Pertinent ROS: taking medications as instructed, no medication side effects noted, no TIA's, no chest pain on exertion, no dyspnea on exertion, no swelling of ankles. Diabetes Mellitus:  Diabetic ROS - medication compliance: compliant all of the time, diabetic diet compliance: compliant most of the time, home glucose monitoring: Patient did not bring glucose log to this visit. .  Further diabetic ROS: no polyuria or polydipsia, no chest pain, dyspnea or TIA's, no numbness, tingling or pain in extremities. Last eye exam approximately <1 yr ago, unable to recall name of provider or practice.      Cough  Mr. Yeny Clark reports his wife used a bug spray product in the home in which he believes he inhaled what ever chemical that may have been used which has caused him intermittent coughing. He is requesting a medication for this. Denies CP or SOB. Neck:  New right sided neck pain/strain. No treatments tried. Denies weakness to BUE or change in sensation. REVIEW OF SYSTEMS:    Review of Systems   Constitutional: Negative. HENT: Negative. Respiratory:  Positive for cough. Negative for choking, chest tightness and shortness of breath. Cardiovascular: Negative. Gastrointestinal: Negative. Genitourinary: Negative. Musculoskeletal:  Positive for neck stiffness. Negative for back pain, gait problem and neck pain. Skin: Negative. Neurological: Negative. VITAL SIGNS:    Wt Readings from Last 3 Encounters:   08/03/22 218 lb 12.8 oz (99.2 kg)   05/02/22 217 lb 6.4 oz (98.6 kg)   03/16/22 224 lb 12.8 oz (102 kg)     Temp Readings from Last 3 Encounters:   08/03/22 98.3 °F (36.8 °C) (Temporal)   05/02/22 98.3 °F (36.8 °C) (Temporal)   03/16/22 97.7 °F (36.5 °C) (Temporal)     BP Readings from Last 3 Encounters:   08/03/22 109/75   05/02/22 101/73   03/16/22 108/72     Pulse Readings from Last 3 Encounters:   08/03/22 92   05/02/22 96   03/16/22 88           PHYSICAL EXAMINATION:       General: Alert, cooperative, no distress  Respiratory: Breathing comfortably, in no acute respiratory distress. Clear to auscultation bilaterally. Cardiovascular: Regular rate and rhythm, S1, S2 normal, no murmur, click, rub or gallop. Extremities: no edema. Abdomen: Soft, non-tender, not distended. Bowel sounds normal. No masses or organomegaly. MSK: Extremities normal appearing, atraumatic, no effusion. Gait steady and unassisted. Skin: Skin color, texture, turgor normal. No rashes or lesions on exposed skin. Neurologic: A/Ox3  Psychiatric: Normal affect. Mood euthymic.  Thoughts logical. Speech volume and speed normal            Treatment risks/benefits/costs/interactions/alternatives discussed with patient. Advised patient to call back or return to office if symptoms worsen/change/persist. If patient cannot reach us or should anything more severe/urgent arise he/she should proceed directly to the nearest emergency department. Discussed expected course/resolution/complications of diagnosis in detail with patient. Patient expressed understanding with the diagnosis and plan. An electronic signature was used to authenticate this note.   -- Vladimir Alegria NP

## 2022-08-03 NOTE — PROGRESS NOTES
Chief Complaint   Patient presents with    Diabetes         1. \"Have you been to the ER, urgent care clinic since your last visit? Hospitalized since your last visit? \" No    2. \"Have you seen or consulted any other health care providers outside of the [address] [place] [address] since your last visit? \" No     3. For patients over 45: Has the patient had a colonoscopy? Yes - no Care Gap present     If the patient is female:    4. For patients over 40: Has the patient had a mammogram? Yes - no Care Gap present    5. For patients over 21: Has the patient had a pap smear?  Yes - no Care Gap present     3 most recent PHQ Screens 5/26/2022   Little interest or pleasure in doing things Not at all   Feeling down, depressed, irritable, or hopeless Not at all   Total Score PHQ 2 0       Health Maintenance Due   Topic Date Due    DTaP/Tdap/Td series (1 - Tdap) Never done    Eye Exam Retinal or Dilated  04/17/2014    Pneumococcal 0-64 years (2 - PCV) 11/11/2016    COVID-19 Vaccine (3 - Booster for Pfizer series) 10/03/2021    Shingrix Vaccine Age 50> (1 of 2) Never done    MICROALBUMIN Q1  03/31/2022    Lipid Screen  03/31/2022

## 2022-08-04 ENCOUNTER — TELEPHONE (OUTPATIENT)
Dept: FAMILY MEDICINE CLINIC | Age: 52
End: 2022-08-04

## 2022-08-04 NOTE — TELEPHONE ENCOUNTER
Called patient. Two patient identifiers verified. Pt said he could not remember the name but that it was on [place] on the right side before you get to [facility]. Asked pt if [facility] sounded correct and he said he thinks so. Pt said that if he can find the number he will call back with it. Records request faxed and confirmed. ----- Message from Nilay Lehman NP sent at 8/4/2022  8:36 AM EDT -----  Regarding: eye exam  Please call patient to follow up on name of eye care provider / practice so we may request records.       thanks

## 2022-08-10 ENCOUNTER — OFFICE VISIT (OUTPATIENT)
Dept: RHEUMATOLOGY | Age: 52
End: 2022-08-10
Payer: COMMERCIAL

## 2022-08-10 VITALS
WEIGHT: 217 LBS | SYSTOLIC BLOOD PRESSURE: 90 MMHG | TEMPERATURE: 98 F | RESPIRATION RATE: 18 BRPM | BODY MASS INDEX: 32.05 KG/M2 | HEART RATE: 96 BPM | DIASTOLIC BLOOD PRESSURE: 64 MMHG

## 2022-08-10 DIAGNOSIS — L40.50 PSORIATIC ARTHRITIS (HCC): Primary | ICD-10-CM

## 2022-08-10 DIAGNOSIS — Z79.60 LONG-TERM USE OF IMMUNOSUPPRESSANT MEDICATION: ICD-10-CM

## 2022-08-10 DIAGNOSIS — M62.838 TRAPEZIUS MUSCLE SPASM: ICD-10-CM

## 2022-08-10 PROCEDURE — 99215 OFFICE O/P EST HI 40 MIN: CPT | Performed by: INTERNAL MEDICINE

## 2022-08-10 RX ORDER — FOLIC ACID 1 MG/1
3 TABLET ORAL DAILY
Qty: 270 TABLET | Refills: 3 | Status: SHIPPED | OUTPATIENT
Start: 2022-08-10

## 2022-08-10 RX ORDER — IXEKIZUMAB 80 MG/ML
80 INJECTION, SOLUTION SUBCUTANEOUS
Qty: 1 EACH | Refills: 11 | Status: CANCELLED | OUTPATIENT
Start: 2022-08-10

## 2022-08-10 RX ORDER — METHOTREXATE 25 MG/ML
25 INJECTION, SOLUTION INTRA-ARTERIAL; INTRAMUSCULAR; INTRAVENOUS
Qty: 12 ML | Refills: 1 | Status: SHIPPED | OUTPATIENT
Start: 2022-08-12

## 2022-08-10 NOTE — PATIENT INSTRUCTIONS
1) Continue to take 1mL of SubQ Methotrexate one time per week with daily folic acid. 2) I will apply for Tremfya injections to replace your Loyde Pipe. There is a good chance that things will get worse before they get better because Tremfya is a slower working medication. 3) I will give you a physical therapy referral to being wherever is most convenient to you. 4) Check labs ASAP. 5) Follow up in 2 months. Let me know if you have any questions or concerns in the meantime.

## 2022-08-10 NOTE — PROGRESS NOTES
REASON FOR VISIT    This is an-office Rheumatology follow-up visit for Mr. Suhas Loo for     ICD-10-CM   1. [facility] ([facility] L40.50   2. Psoriasis L40.9       Spondyloarthritis phenotype includes:  Anti-CCP positive: no  Rheumatoid factor positive: no  HLA-B27 positive: no  Inflammatory Back Pain: no  Erosive disease: yes  Sacroiliitis: no  Ankylosis: no  Psoriasis: yes  Enthesitis: no  Dactylitis: no  Nail Pitting: no  Onycholysis: no  Splinter Hemorrhage: no  Extra-articular manifestations include: none  SAPHO: no    Immunosuppression Screening (10/14/2021): Quantiferon TB: negative  PPD:  Not performed  Hepatitis B: negative  Hepatitis C: negative    Therapy History includes:  Current NSAIDs include: Celebrex 200 mg twice daily  Current DMARD include: methotrexate 25 mg SubQ every Friday (6/24/2020 to 7/2021; 10/14/2021 to present), Taltz 80 mg every 4 weeks (9/17/2020 to 7/2021; 10/2021 to present)  Prior DMARD therapy includes: sulfasalazine 1000 mg twice daily (6/27/2012 to 8/13/2014; 12/02/2014 to 500 mg twice daily;10/02/2014 to 12/02/2014), methotrexate 20 mg every Saturday (8/13/2014 to 5/27/2015; insurance coverage, 6/28/2019 to 7/28/2019; did not refill, 8/29/2019 to 6/24/2020),  Enbrel, Humira, Remicade, Otezla 30 mg twice daily (5/2015 to 6/2018), Stelara, Cosentyx 300 mg every 30 days (6/2018 to 9/2019; loss of patient assistance, 1/03/2020 to 9/16/2020)  The following DMARDs have been ineffective: sulfasalazine, methotrexate PO, Otezla, Enbrel, Humira, Remicade, Stelara, Cosentyx   The following DMARDs were stopped because of side effects: none    HISTORY OF PRESENT ILLNESS    Mr. Suhas Loo returns for an in-office follow-up visit. Pt takes 1 mL of SubQ Methotrexate one time per week with daily folic acid. Pt takes monthly Taltz injections. He says that this cleared up most of his psoriasis, but he still has one persistent plaque on the top of his R foot.  He says that the psoriasis on his foot is itchy. He recalls that his dermatologist put ointment on his psoriasis and it made it go away for 6 months, but his new dermatologist would not use this cream on him. He says that using clobetasol cream is not helping this spot. Pt reports that the pain is the worst around his upper R trapezius. He says that the pain in his trapezius keeps him from sleeping at night. He takes one pill of Celebrex in the morning and one at night. He does not know whether or not this helps his neck pain. Pt notes that his fingers are painful in the morning and they get better throughout the day. Pt reports that he has a constant pain in his lower L abdomen. Pt says the prescription shampoo his dermatologist prescribed did not help his hair thinning. REVIEW OF SYSTEMS    A comprehensive review of systems was performed and pertinent results are documented in the HPI, review of systems is otherwise non-contributory. PAST MEDICAL HISTORY    He has a past medical history of Arthritis, Diabetes ([facility] (5/27/2009), Elevated liver enzymes, Hypercholesterolemia, Psoriasis (5/27/2009), and Psoriatic arthritis ([facility] (5/27/2009). FAMILY HISTORY    His family history includes Asthma in his sister; Diabetes in his father and paternal grandmother; No Known Problems in his brother and mother. SOCIAL HISTORY    He reports that he has been smoking cigarettes. He has a 10.00 pack-year smoking history. He has never used smokeless tobacco. He reports that he does not drink alcohol and does not use drugs. MEDICATIONS    Current Outpatient Medications   Medication Sig    fexofenadine (ALLEGRA) 180 mg tablet Take 1 Tablet by mouth in the morning. Trulicity 1.5 IB/1.0 mL sub-q pen INJECT 0.5 ML BY SUBCUTANEOUS ROUTE EVERY SEVEN (7) DAYS. L.acid,mg,rham-B.bifid,long (Digestive Probiotic) 3 billion cell cap Take 1 Capsule by mouth daily.  (Patient not taking: Reported on 8/3/2022)    promethazine (PHENERGAN) 25 mg tablet Take 1 Tablet by mouth every six (6) hours as needed for Nausea. (Patient not taking: Reported on 8/3/2022)    butalbital-acetaminophen-caffeine (FIORICET, ESGIC) -40 mg per tablet Take 1 Tablet by mouth every six (6) hours as needed for Headache. (Patient not taking: Reported on 8/3/2022)    empagliflozin (JARDIANCE) 25 mg tablet Take 1 Tablet by mouth daily. metFORMIN ER (GLUCOPHAGE XR) 500 mg tablet Take 2 Tablets by mouth two (2) times daily (with meals). glimepiride (AMARYL) 4 mg tablet Take 1 Tablet by mouth every morning. clobetasoL (TEMOVATE) 0.05 % ointment Apply  to affected area two (2) times a day. Apply to right ankle (Patient not taking: No sig reported)    celecoxib (CELEBREX) 200 mg capsule Take 1 Capsule by mouth two (2) times daily as needed for Pain. folic acid (FOLVITE) 1 mg tablet Take 3 Tablets by mouth daily. lisinopriL (PRINIVIL, ZESTRIL) 2.5 mg tablet Take 2.5 mg by mouth daily. (Patient not taking: Reported on 8/3/2022)    ketoconazole (NIZORAL) 2 % shampoo WORK INTO SCALP AND ALLOW TO SIT FOR 5 MINUTES BEFORE RINSING THREE TIMES WEEKLY. (Patient not taking: Reported on 5/2/2022)    finasteride (PROPECIA) 1 mg tablet Do not crush, chew, or split. Take 1 by mouth daily. (Patient not taking: Reported on 8/3/2022)    propranoloL (INDERAL) 20 mg tablet Take 1 Tablet by mouth daily. (Patient not taking: No sig reported)    omeprazole (PRILOSEC) 40 mg capsule TAKE 1 CAP BY MOUTH DAILY (BEFORE BREAKFAST). ixekizumab (Taltz Autoinjector) 80 mg/mL auto injector 1 mL by SubCUTAneous route every thirty (30) days. traZODone (DESYREL) 50 mg tablet Take 1 Tablet by mouth nightly as needed for Sleep. (Patient not taking: Reported on 8/3/2022)    methotrexate 25 mg/mL chemo injection 1 mL by SubCUTAneous route Every Friday. BD Insulin Syringe Ultra-Fine 1 mL 31 gauge x 5/16 syrg 1 EACH BY DOES NOT APPLY ROUTE EVERY SATURDAY FOR 12 DOSES.  TO BE USED FOR METHOTREXATE SOLUTION    atorvastatin (LIPITOR) 40 mg tablet Take 1 Tab by mouth daily. acetaminophen (TYLENOL) 500 mg tablet TAKE 1 TO 2 TABLETS BY MOUTH 3 TIMES A DAY AS NEEDED FOR PAIN     No current facility-administered medications for this visit. ALLERGIES    Allergies   Allergen Reactions    Iodinated Contrast Media Shortness of Breath       PHYSICAL EXAMINATION    Visit Vitals  BP 90/64   Pulse 96   Temp 98 °F (36.7 °C)   Resp 18   Wt 217 lb (98.4 kg)   BMI 32.05 kg/m²       Body mass index is 32.05 kg/m². General:  The patient is well developed, well nourished, alert, and in no apparent distress. Eyes: Sclera are anicteric. No conjunctival injection. HEENT:  Oropharynx is clear. No oral ulcers. Adequate salivary pooling. No cervical or supraclavicular lymphadenopathy. Lungs:  Clear to auscultation bilaterally, without wheeze or stridor. Normal respiratory effort. Cor:  Regular rate and rhythm. No murmurs rubs or gallops. Abdomen: Soft, non-tender, without hepatomegaly or masses. Extremities: No calf tenderness or edema. Warm and well perfused. Skin:  Stable psoriaform plaques proximal to right lateral achilles. No current nail abnormalities. Neuro: Nonfocal  Musculoskeletal:    A comprehensive musculoskeletal exam was performed for all joints of each upper and lower extremity and assessed for swelling, tenderness and range of motion. Results are documented as below:  Right basilar neck pain most prominently on full leftward rotation. Right elbow limited by ~5deg from full extension, increased tenderness, no warmth or effusion  Tenderness to right PIP3 and PIP4 with trace synovitis. No evidence of synovitis in the small joints of the hands, wrists, shoulders, elbows, hips, knees or ankles.        DATA REVIEW    Laboratory   3/9/22: ESR 2, Cr 0.92, LFT WNL, CBC WNL, CRP <1 mg/L  10/14/22: Hep Be Ab neg, Hep C virus Ab <0.1, Hep B core Ab IgM neg, Hep B Core Ab total neg, Hep B surface AB non reactive, Hep B surface Ag screen neg, QuantiFERON plus neg  Recent laboratory results were reviewed, summarized, and discussed with the patient. Imaging    Musculoskeletal Ultrasound    None    Radiographs    XR SHOULDER RT AP/LAT MIN 2 V (5/2/22): No acute abnormality. Bilateral Hand 10/12/2021: LEFT: no fracture, dislocation or other acute osseous or articular abnormality. The soft tissues are within normal limits. Joint space narrowing and erosive changes are noted predominantly in the third and fourth PIP joints, progressive compared to the prior exam. RIGHT: no fracture or other acute osseous or articular abnormality. The soft tissues are within normal limits. Mild joint space narrowing is now noted in the DIP and PIP joints of the second through fourth digits. Bilateral Hand 5/29/2019: LEFT: No fracture or dislocation on plain film. No joint space narrowing. No joint space erosion or periosteal reaction. Alignment is within normal limits. Bone mineralization is decreased. No soft tissue calcification. RIGHT: No fracture or dislocation on plain film. No joint space narrowing. There is no joint space erosion. There is subtle periostitis distal phalanx of the middle finger. Alignment is within normal limits. Bone mineralization is decreased. No soft tissue calcification. Chest 3/30/2019: Cardiac monitoring wires overlie the thorax. The cardiomediastinal and hilar contours are within normal limits. The pulmonary vasculature is within normal limits. The lungs and pleural spaces are clear. The visualized bones and upper abdomen are age-appropriate. Right Elbow 12/15/2014: a moderate right elbow effusion. A nondisplaced fracture of the radial head is suspected. No dislocation is shown. CT Imaging    CT Head without contrast 9/10/2010: normal ventricles and basal cisterns. No intracranial masses or hemorrhages are seen. No focal intraparenchymal low density abnormalities are seen.     MR Imaging    MRI Right Elbow without contrast 12/22/2014: there is a large complex appearing elbow joint effusion. There is  diffuse chondral thinning. Mild diffuse osseous edema is shown. The elbow collateral ligaments are intact. No tendon derangement is demonstrated. No soft tissue mass is shown. DXA     None    PROCEDURE     Right Elbow Kenalog 40 mg IA. . (12/17/2020)     ASSESSMENT AND PLAN    This is a follow-up visit for Mr. Selena Lopez for psoriatic arthritis in clinical remission on regular high-dose methotrexate and Taltz. Ongoing abdominal discomfort on Taltz and breakthrough arthritis with moderate disease activity, anticipate transitioning to Tremfya or formulary-preferred anti-IL23 once labs are done. 1. Psoriatic arthritis (HCC)  - methotrexate 25 mg/mL chemo injection; 1 mL by SubCUTAneous route Every Friday. Dispense: 12 mL; Refill: 1  - folic acid (FOLVITE) 1 mg tablet; Take 3 Tablets by mouth in the morning. Dispense: 270 Tablet; Refill: 3  - C REACTIVE PROTEIN, QT; Future  - CBC WITH AUTOMATED DIFF; Future  - METABOLIC PANEL, COMPREHENSIVE; Future  - SED RATE (ESR); Future  - REFERRAL TO PHYSICAL THERAPY; Future  - C REACTIVE PROTEIN, QT  - CBC WITH AUTOMATED DIFF  - METABOLIC PANEL, COMPREHENSIVE  - SED RATE (ESR)  - REFERRAL TO PAIN MANAGEMENT; Future    2. Long-term use of immunosuppressant medication  - folic acid (FOLVITE) 1 mg tablet; Take 3 Tablets by mouth in the morning. Dispense: 270 Tablet; Refill: 3  - CBC WITH AUTOMATED DIFF; Future  - METABOLIC PANEL, COMPREHENSIVE; Future  3. Trapezius muscle spasm  - REFERRAL TO PHYSICAL THERAPY; Future  - REFERRAL TO PAIN MANAGEMENT; Future     Patient Instructions   1) Continue to take 1mL of SubQ Methotrexate one time per week with daily folic acid. 2) I will apply for Tremfya injections to replace your Letty . There is a good chance that things will get worse before they get better because Tremfya is a slower working medication.      3) I will give you a physical therapy referral to being wherever is most convenient to you. 4) Check labs ASAP. 5) Follow up in 2 months. Let me know if you have any questions or concerns in the meantime. TODAY'S ORDERS    Orders Placed This Encounter    C REACTIVE PROTEIN, QT    CBC WITH AUTOMATED DIFF    METABOLIC PANEL, COMPREHENSIVE    SED RATE (ESR)    REFERRAL TO PHYSICAL THERAPY    REFERRAL TO PAIN MANAGEMENT    methotrexate 25 mg/mL chemo injection    folic acid (FOLVITE) 1 mg tablet       Future Appointments   Date Time Provider Gale Brewer   8/10/2022  3:20 PM Yovani Mascorro MD [facility] BS AMB   11/9/2022  3:00 PM Nickolas Dubon, NP PA[facility] BS AMB     Face to face time: 25 minutes  Note preparation and records review day of service: 18 minutes  Total provider time day of service: 43 minutes    This was scribed by Chester Stanley in the presence of Dr. Wil Matthew MD S    Adult Rheumatology   [address]  [facility], [address] [place] [address]   Phone [phone]  Fax [phone]    --ADDENDUM--  Updated labs s/f longstanding low-grade alkphos elevation, glucosuria, o/w normal. Applying for Tremfya.

## 2022-08-11 ENCOUNTER — TELEPHONE (OUTPATIENT)
Dept: FAMILY MEDICINE CLINIC | Age: 52
End: 2022-08-11

## 2022-08-11 ENCOUNTER — NURSE TRIAGE (OUTPATIENT)
Dept: OTHER | Facility: CLINIC | Age: 52
End: 2022-08-11

## 2022-08-11 NOTE — TELEPHONE ENCOUNTER
Received call from Araceli Paul at [facility] with Red Flag Complaint. Subjective: Caller states \"Left side pain\"     Current Symptoms: Left flank pain;  Denies diarrhea or blood in stool; Worried about kidney stones; Onset: 2 days ago; sudden    Associated Symptoms: reduced activity    Pain Severity:  unable to do scale; stabbing pain    Temperature: denies     What has been tried: Attempted to use bathroom and eat;    Recommended disposition: See in Office Today    Care advice provided, patient verbalizes understanding; denies any other questions or concerns; instructed to call back for any new or worsening symptoms. Patient/Caller agrees with recommended disposition; writer provided warm transfer to Navneet Arceo at [facility] for appointment scheduling    Attention Provider: Thank you for allowing me to participate in the care of your patient. The patient was connected to triage in response to information provided to the [facility]. Please do not respond through this encounter as the response is not directed to a shared pool.     Reason for Disposition   MODERATE pain (e.g., interferes with normal activities or awakens from sleep)    Protocols used: Flank Pain-ADULT-[place]

## 2022-08-11 NOTE — TELEPHONE ENCOUNTER
Called patient. Two patient identifiers verified. Informed pt that NP Sandra Gonzalez saw he was on the schedule for possible kidney stone and informed pt that the only way to tell if it is a kidney stone or not it through a CT scan so he should just go to [place] [facility] so they can do that for him there. Verbalized understanding.

## 2022-08-13 ENCOUNTER — APPOINTMENT (OUTPATIENT)
Dept: CT IMAGING | Age: 52
End: 2022-08-13
Attending: EMERGENCY MEDICINE
Payer: COMMERCIAL

## 2022-08-13 ENCOUNTER — HOSPITAL ENCOUNTER (EMERGENCY)
Age: 52
Discharge: HOME | End: 2022-08-13
Attending: EMERGENCY MEDICINE
Payer: COMMERCIAL

## 2022-08-13 VITALS
DIASTOLIC BLOOD PRESSURE: 82 MMHG | TEMPERATURE: 97.8 F | HEART RATE: 82 BPM | OXYGEN SATURATION: 97 % | RESPIRATION RATE: 17 BRPM | BODY MASS INDEX: 32.89 KG/M2 | WEIGHT: 217 LBS | SYSTOLIC BLOOD PRESSURE: 111 MMHG | HEIGHT: 68 IN

## 2022-08-13 DIAGNOSIS — E11.65 TYPE 2 DIABETES MELLITUS WITH HYPERGLYCEMIA, WITHOUT LONG-TERM CURRENT USE OF INSULIN (HCC): ICD-10-CM

## 2022-08-13 DIAGNOSIS — R91.1 PULMONARY NODULE, RIGHT: ICD-10-CM

## 2022-08-13 DIAGNOSIS — R10.84 ABDOMINAL PAIN, GENERALIZED: Primary | ICD-10-CM

## 2022-08-13 LAB
ALBUMIN SERPL-MCNC: 3.9 G/DL (ref 3.5–5)
ALBUMIN/GLOB SERPL: 1.1 {RATIO} (ref 1.1–2.2)
ALP SERPL-CCNC: 122 U/L (ref 45–117)
ALT SERPL-CCNC: 36 U/L (ref 12–78)
ANION GAP SERPL CALC-SCNC: 3 MMOL/L (ref 5–15)
APPEARANCE UR: CLEAR
AST SERPL-CCNC: 14 U/L (ref 15–37)
BACTERIA URNS QL MICRO: NEGATIVE /HPF
BASOPHILS # BLD: 0 K/UL (ref 0–0.1)
BASOPHILS NFR BLD: 1 % (ref 0–1)
BILIRUB SERPL-MCNC: 0.5 MG/DL (ref 0.2–1)
BILIRUB UR QL: NEGATIVE
BUN SERPL-MCNC: 19 MG/DL (ref 6–20)
BUN/CREAT SERPL: 22 (ref 12–20)
CALCIUM SERPL-MCNC: 9.2 MG/DL (ref 8.5–10.1)
CHLORIDE SERPL-SCNC: 108 MMOL/L (ref 97–108)
CO2 SERPL-SCNC: 27 MMOL/L (ref 21–32)
COLOR UR: ABNORMAL
CREAT SERPL-MCNC: 0.87 MG/DL (ref 0.7–1.3)
CRP SERPL-MCNC: <0.29 MG/DL (ref 0–0.6)
DIFFERENTIAL METHOD BLD: NORMAL
EOSINOPHIL # BLD: 0.1 K/UL (ref 0–0.4)
EOSINOPHIL NFR BLD: 1 % (ref 0–7)
EPITH CASTS URNS QL MICRO: ABNORMAL /LPF
ERYTHROCYTE [DISTWIDTH] IN BLOOD BY AUTOMATED COUNT: 12.4 % (ref 11.5–14.5)
ERYTHROCYTE [SEDIMENTATION RATE] IN BLOOD: 3 MM/HR (ref 0–20)
GLOBULIN SER CALC-MCNC: 3.4 G/DL (ref 2–4)
GLUCOSE SERPL-MCNC: 170 MG/DL (ref 65–100)
GLUCOSE UR STRIP.AUTO-MCNC: >1000 MG/DL
HCT VFR BLD AUTO: 48 % (ref 36.6–50.3)
HGB BLD-MCNC: 16.5 G/DL (ref 12.1–17)
HGB UR QL STRIP: NEGATIVE
HYALINE CASTS URNS QL MICRO: ABNORMAL /LPF (ref 0–5)
IMM GRANULOCYTES # BLD AUTO: 0 K/UL (ref 0–0.04)
IMM GRANULOCYTES NFR BLD AUTO: 0 % (ref 0–0.5)
KETONES UR QL STRIP.AUTO: NEGATIVE MG/DL
LEUKOCYTE ESTERASE UR QL STRIP.AUTO: NEGATIVE
LIPASE SERPL-CCNC: 222 U/L (ref 73–393)
LYMPHOCYTES # BLD: 2.3 K/UL (ref 0.8–3.5)
LYMPHOCYTES NFR BLD: 35 % (ref 12–49)
MCH RBC QN AUTO: 29.6 PG (ref 26–34)
MCHC RBC AUTO-ENTMCNC: 34.4 G/DL (ref 30–36.5)
MCV RBC AUTO: 86 FL (ref 80–99)
MONOCYTES # BLD: 0.6 K/UL (ref 0–1)
MONOCYTES NFR BLD: 9 % (ref 5–13)
NEUTS SEG # BLD: 3.5 K/UL (ref 1.8–8)
NEUTS SEG NFR BLD: 54 % (ref 32–75)
NITRITE UR QL STRIP.AUTO: NEGATIVE
NRBC # BLD: 0 K/UL (ref 0–0.01)
NRBC BLD-RTO: 0 PER 100 WBC
PH UR STRIP: 5 [PH] (ref 5–8)
PLATELET # BLD AUTO: 207 K/UL (ref 150–400)
PMV BLD AUTO: 9.7 FL (ref 8.9–12.9)
POTASSIUM SERPL-SCNC: 4.4 MMOL/L (ref 3.5–5.1)
PROT SERPL-MCNC: 7.3 G/DL (ref 6.4–8.2)
PROT UR STRIP-MCNC: NEGATIVE MG/DL
RBC # BLD AUTO: 5.58 M/UL (ref 4.1–5.7)
RBC #/AREA URNS HPF: ABNORMAL /HPF (ref 0–5)
SODIUM SERPL-SCNC: 138 MMOL/L (ref 136–145)
SP GR UR REFRACTOMETRY: 1.02 (ref 1–1.03)
UR CULT HOLD, URHOLD: NORMAL
UROBILINOGEN UR QL STRIP.AUTO: 0.2 EU/DL (ref 0.2–1)
WBC # BLD AUTO: 6.5 K/UL (ref 4.1–11.1)
WBC URNS QL MICRO: ABNORMAL /HPF (ref 0–4)

## 2022-08-13 PROCEDURE — 99284 EMERGENCY DEPT VISIT MOD MDM: CPT

## 2022-08-13 PROCEDURE — 74176 CT ABD & PELVIS W/O CONTRAST: CPT

## 2022-08-13 PROCEDURE — 86140 C-REACTIVE PROTEIN: CPT

## 2022-08-13 PROCEDURE — 83690 ASSAY OF LIPASE: CPT

## 2022-08-13 PROCEDURE — 85652 RBC SED RATE AUTOMATED: CPT

## 2022-08-13 PROCEDURE — 85025 COMPLETE CBC W/AUTO DIFF WBC: CPT

## 2022-08-13 PROCEDURE — 36415 COLL VENOUS BLD VENIPUNCTURE: CPT

## 2022-08-13 PROCEDURE — 81001 URINALYSIS AUTO W/SCOPE: CPT

## 2022-08-13 PROCEDURE — 80053 COMPREHEN METABOLIC PANEL: CPT

## 2022-08-13 NOTE — ED PROVIDER NOTES
Abdominal Pain   Pertinent negatives include no fever, no diarrhea, no nausea, no vomiting, no dysuria, no headaches, no chest pain and no back pain. Patient is a 62-year-old male with past medical history significant for psoriatic arthritis, hypercholesterolemia, elevated liver enzymes, type 2 diabetes who presents to the ED with reports of 1 week of intermittent abdominal pain that started on the right upper quadrant and now is present on the left mid to lower quadrants. Denies any fever, difficulty breathing, difficulty swallowing, SOB or chest pain. Denies any nausea, vomiting., urinary symptoms, constipation or diarrhea. Denies cough, cold symptoms, headache, neck pain, visual changes, focal weakness or rash.   Patient reports he has not had any food or medications today prior to arrival.        Past Medical History:   Diagnosis Date    Arthritis     Diabetes ([facility] 5/27/2009    Elevated liver enzymes     resolved     Hypercholesterolemia     Psoriasis 5/27/2009    Psoriatic arthritis ([facility] 5/27/2009       Past Surgical History:   Procedure Laterality Date    HX CATARACT REMOVAL      right         Family History:   Problem Relation Age of Onset    Diabetes Paternal Grandmother     No Known Problems Mother     Diabetes Father     Asthma Sister     No Known Problems Brother        Social History     Socioeconomic History    Marital status:      Spouse name: Not on file    Number of children: Not on file    Years of education: Not on file    Highest education level: Not on file   Occupational History    Not on file   Tobacco Use    Smoking status: Every Day     Packs/day: 1.00     Years: 10.00     Pack years: 10.00     Types: Cigarettes    Smokeless tobacco: Never   Vaping Use    Vaping Use: Never used   Substance and Sexual Activity    Alcohol use: No    Drug use: No    Sexual activity: Yes     Partners: Female   Other Topics Concern    Not on file   Social History Narrative    Not on file     Social Determinants of Health     Financial Resource Strain: Not on file   Food Insecurity: Not on file   Transportation Needs: Not on file   Physical Activity: Not on file   Stress: Not on file   Social Connections: Not on file   Intimate Partner Violence: Not on file   Housing Stability: Not on file         ALLERGIES: Iodinated contrast media    Review of Systems   Constitutional:  Positive for appetite change. Negative for activity change, fever and unexpected weight change. HENT:  Negative for congestion, rhinorrhea, sore throat and trouble swallowing. Eyes:  Negative for visual disturbance. Respiratory:  Negative for cough and shortness of breath. Cardiovascular:  Negative for chest pain, palpitations and leg swelling. Gastrointestinal:  Positive for abdominal pain. Negative for diarrhea, nausea and vomiting. Genitourinary:  Negative for dysuria and flank pain. Musculoskeletal:  Negative for back pain and neck pain. Skin:  Negative for rash. Neurological:  Negative for dizziness, light-headedness and headaches. All other systems reviewed and are negative. Vitals:    08/13/22 1254   BP: 111/82   Pulse: 82   Resp: 17   Temp: 97.8 °F (36.6 °C)   SpO2: 97%   Weight: 98.4 kg (217 lb)   Height: 5' 8\" (1.727 m)            Physical Exam  Vitals and nursing note reviewed. Constitutional:       General: He is not in acute distress. Appearance: He is well-developed. He is not ill-appearing, toxic-appearing or diaphoretic. Comments: Male; smoker   HENT:      Head: Normocephalic. Mouth/Throat:      Mouth: Mucous membranes are moist.   Cardiovascular:      Rate and Rhythm: Normal rate and regular rhythm. Pulmonary:      Effort: Pulmonary effort is normal.      Breath sounds: Normal breath sounds. Abdominal:      General: Bowel sounds are normal. There is no distension. There are no signs of injury. Palpations: Abdomen is soft. Tenderness:  There is abdominal tenderness in the right upper quadrant, periumbilical area and left upper quadrant. There is no guarding or rebound. Negative signs include Arvizu's sign. Hernia: No hernia is present. Skin:     General: Skin is warm and dry. Findings: No erythema or rash. Neurological:      General: No focal deficit present. Mental Status: He is alert and oriented to person, place, and time. MDM         Procedures    CT ABD PELV WO CONT    Result Date: 8/13/2022  No acute intraperitoneal process is identified. Chronic basilar pulmonary nodule on the right. Incidental and/or nonemergent findings are as described in detail above. Labs Reviewed   METABOLIC PANEL, COMPREHENSIVE - Abnormal; Notable for the following components:       Result Value    Anion gap 3 (*)     Glucose 170 (*)     BUN/Creatinine ratio 22 (*)     AST (SGOT) 14 (*)     Alk. phosphatase 122 (*)     All other components within normal limits   URINE CULTURE HOLD SAMPLE   CBC WITH AUTOMATED DIFF   LIPASE   SED RATE (ESR)   C REACTIVE PROTEIN, QT   SAMPLES BEING HELD   URINALYSIS W/MICROSCOPIC     Patient has been reexamined and denies any further complaints of pain or discomfort. He was given referral to gastroenterology for further evaluation and treatment if stomach pains continue. No prescriptions were written. 3:56 PM  Patient's results and plan of care have been reviewed with him. Patient has verbally conveyed his understanding and agreement of his signs, symptoms, diagnosis, treatment and prognosis and additionally agrees to follow up as recommended or return to the Emergency Room should his condition change prior to follow-up. Discharge instructions have also been provided to the patient with some educational information regarding his diagnosis as well a list of reasons why he would want to return to the ER prior to his follow-up appointment should his condition change. Marylou Lal NP

## 2022-08-13 NOTE — ED TRIAGE NOTES
Pain x 2-3 days- states it started on the right side and more in the LLQ at this time. Pt states \"I think I have liver problems, but no one has told me this. \".  Pt denies fevers, but reports nausea.   Denies diarrhea or urinary sx's

## 2022-08-19 ENCOUNTER — TELEPHONE (OUTPATIENT)
Dept: RHEUMATOLOGY | Age: 52
End: 2022-08-19

## 2022-08-19 NOTE — TELEPHONE ENCOUNTER
Pt called to advise he had lab work done at [facility]. [facility]. Would like a call at [phone].

## 2022-08-30 RX ORDER — GUSELKUMAB 100 MG/ML
100 INJECTION SUBCUTANEOUS
Qty: 2 EACH | Refills: 0 | Status: SHIPPED | OUTPATIENT
Start: 2022-08-30 | End: 2022-09-28

## 2022-09-02 ENCOUNTER — DOCUMENTATION ONLY (OUTPATIENT)
Dept: PHARMACY | Age: 52
End: 2022-09-02

## 2022-09-02 NOTE — PROGRESS NOTES
[facility] [address] [facility] Update    Date: 09/02/22    Nena Johnson Lilia 1970     Medication: Tremfya 100MG/ML SOPN     Prior Authorization: good until 08/31/2023    Co-pay $0    Fill: 08/31/2022    Ship: 09/01/2022    Deliver: 09/02/2022    Next Fill Due: 09/21/2022    Pharmacist offered counseling to the patient. Handout provided.     [address] [place] [address] Elena   [place], [facility] [address]  phone: [phone]   fax: [phone]

## 2022-09-21 DIAGNOSIS — E11.65 UNCONTROLLED TYPE 2 DIABETES MELLITUS WITH HYPERGLYCEMIA (HCC): ICD-10-CM

## 2022-09-21 RX ORDER — DULAGLUTIDE 1.5 MG/.5ML
INJECTION, SOLUTION SUBCUTANEOUS
Qty: 2 EACH | Refills: 1 | Status: SHIPPED | OUTPATIENT
Start: 2022-09-21

## 2022-10-04 ENCOUNTER — TELEPHONE (OUTPATIENT)
Dept: RHEUMATOLOGY | Age: 52
End: 2022-10-04

## 2022-10-04 NOTE — TELEPHONE ENCOUNTER
Patient called stating he switched from [address] to [place], and he attempted to inject himself but was unable to do it correctly. He would like a nurse visit for teaching. With Dr Lizzy Medel nurse leaving, unsure where to place patient for said teaching.      Call back number is [phone]

## 2022-10-10 ENCOUNTER — OFFICE VISIT (OUTPATIENT)
Dept: RHEUMATOLOGY | Age: 52
End: 2022-10-10
Payer: COMMERCIAL

## 2022-10-10 ENCOUNTER — TELEPHONE (OUTPATIENT)
Dept: RHEUMATOLOGY | Age: 52
End: 2022-10-10

## 2022-10-10 VITALS — BODY MASS INDEX: 31.93 KG/M2 | WEIGHT: 210 LBS | RESPIRATION RATE: 16 BRPM

## 2022-10-10 DIAGNOSIS — Z79.60 LONG-TERM USE OF IMMUNOSUPPRESSANT MEDICATION: ICD-10-CM

## 2022-10-10 DIAGNOSIS — L40.50 PSORIATIC ARTHRITIS (HCC): Primary | ICD-10-CM

## 2022-10-10 PROCEDURE — 99215 OFFICE O/P EST HI 40 MIN: CPT | Performed by: INTERNAL MEDICINE

## 2022-10-10 RX ORDER — GUSELKUMAB 100 MG/ML
1 INJECTION SUBCUTANEOUS ONCE
Qty: 1 EACH | Refills: 0 | Status: SHIPPED | COMMUNITY
Start: 2022-10-10 | End: 2022-10-10

## 2022-10-10 NOTE — LETTER
10/10/2022    Patient: Samuel Salas   YOB: 1970   Date of Visit: 10/10/2022     [place] [id number]  [address] Sarwat Saldaña MD  [address] [place] [address]  Via Fax: [phone]    Dear TAL Chiang MD,    We recently saw Mr. Caron Salcedo in the [facility] for evaluation. My notes for this consultation are attached. If you have questions, please do not hesitate to call me. I look forward to following your patient along with you.     Sincerely,  Fatou Anderson MD [facility]  Cell: [phone]

## 2022-10-10 NOTE — PATIENT INSTRUCTIONS
1) Take Tremfya injection November 5 then resume injections every other month. Enroll in the [facility] patient assistance program and reach out and ask about getting a replacement injection for the first one. The link is:  http://thinkingphones/     2) Take 1mL of sub cutaneous Methotrexate one time per week with daily folic acid. 3) Check labs in one month. 4) Follow up in 2 months. Let me know if you have any  questions or concerns in the meantime.

## 2022-10-10 NOTE — PROGRESS NOTES
Chief Complaint   Patient presents with    Joint Pain     1. Have you been to the ER, urgent care clinic since your last visit? Hospitalized since your last visit? No    2. Have you seen or consulted any other health care providers outside of the [address] [place] [address] since your last visit? Include any pap smears or colon screening.  No

## 2022-10-10 NOTE — TELEPHONE ENCOUNTER
Pt called office to confirm appt for today at 4pm. Pt is fully aware and understood appt is today at 4.

## 2022-10-10 NOTE — PROGRESS NOTES
REASON FOR VISIT    This is an-office Rheumatology follow-up visit for Mr. Denzel Arias for     ICD-10-CM   1. Psoriatic arthritis ([facility] L40.50   2. Psoriasis L40.9       Spondyloarthritis phenotype includes:  Anti-CCP positive: no  Rheumatoid factor positive: no  HLA-B27 positive: no  Inflammatory Back Pain: no  Erosive disease: yes  Sacroiliitis: no  Ankylosis: no  Psoriasis: yes  Enthesitis: no  Dactylitis: no  Nail Pitting: no  Onycholysis: no  Splinter Hemorrhage: no  Extra-articular manifestations include: none  SAPHO: no    Immunosuppression Screening (10/14/2021): Quantiferon TB: negative  PPD:  Not performed  Hepatitis B: negative  Hepatitis C: negative    Therapy History includes:  Current NSAIDs include: Celebrex 200 mg twice daily  Current DMARD include: methotrexate 25 mg SubQ every Friday (6/24/2020 to 7/2021; 10/14/2021 to present), Tremfya 100mg q2mo (9/22- )  Prior DMARD therapy includes: sulfasalazine 1000 mg twice daily (6/27/2012 to 8/13/2014; 12/02/2014 to 500 mg twice daily;10/02/2014 to 12/02/2014), methotrexate 20 mg every Saturday (8/13/2014 to 5/27/2015; insurance coverage, 6/28/2019 to 7/28/2019; did not refill, 8/29/2019 to 6/24/2020),  Enbrel, Humira, Remicade, Otezla 30 mg twice daily (5/2015 to 6/2018), Stelara, Cosentyx 300 mg every 30 days (6/2018 to 9/2019; loss of patient assistance, 1/03/2020 to 9/16/2020),  Taltz 80 mg every 4 weeks (9/17/2020 to 7/2021; 10/2021 to 8/22, GI upset)  The following DMARDs have been ineffective: sulfasalazine, methotrexate PO, Otezla, Enbrel, Humira, Remicade, Stelara, Cosentyx   The following DMARDs were stopped because of side effects: none    HISTORY OF PRESENT ILLNESS    Mr. Denzel Arias returns for an in-office follow-up visit. Pt has not been on Methotrexate for the past few weeks. Pt took his first injection of Tremfya on the 5th.  He took one injection before this but he did not get most of the medication into his body because he did not know how to do the injection. No immediate changes in the scaly palm-sized lesion on the lateral right ankle. Pt still has a lot of pain in his fingers and right shoulder. He also has pain in his right elbow. He has been wearing his right elbow compression sleeve which helps. Pt says that his knees are better today since last visit and they are not swollen today. Pt denies breathing problems. REVIEW OF SYSTEMS    A comprehensive review of systems was performed and pertinent results are documented in the HPI, review of systems is otherwise non-contributory. PAST MEDICAL HISTORY    He has a past medical history of Arthritis, Diabetes ([facility] [address] (5/27/2009), Elevated liver enzymes, Hypercholesterolemia, Psoriasis (5/27/2009), and Psoriatic arthritis ([facility] [address] (5/27/2009). FAMILY HISTORY    His family history includes Asthma in his sister; Diabetes in his father and paternal grandmother; No Known Problems in his brother and mother. SOCIAL HISTORY    He reports that he has been smoking cigarettes. He has a 10.00 pack-year smoking history. He has never used smokeless tobacco. He reports that he does not drink alcohol and does not use drugs. MEDICATIONS    Current Outpatient Medications   Medication Sig    Trulicity 1.5 FL/6.5 mL sub-q pen INJECT 0.5 ML BY SUBCUTANEOUS ROUTE EVERY SEVEN (7) DAYS. guselkumab 100 mg/mL atIn 100 mg by SubCUTAneous route every two (2) months. For maintenance, to begin 8 weeks after 2nd loading dose. methotrexate 25 mg/mL chemo injection 1 mL by SubCUTAneous route Every Friday. folic acid (FOLVITE) 1 mg tablet Take 3 Tablets by mouth in the morning. fexofenadine (ALLEGRA) 180 mg tablet Take 1 Tablet by mouth in the morning. (Patient not taking: Reported on 8/10/2022)    L.acid,mg,rham-B.bifid,long (Digestive Probiotic) 3 billion cell cap Take 1 Capsule by mouth daily.  (Patient not taking: No sig reported)    promethazine (PHENERGAN) 25 mg tablet Take 1 Tablet by mouth every six (6) hours as needed for Nausea. (Patient not taking: No sig reported)    butalbital-acetaminophen-caffeine (FIORICET, ESGIC) -40 mg per tablet Take 1 Tablet by mouth every six (6) hours as needed for Headache. (Patient not taking: No sig reported)    empagliflozin (JARDIANCE) 25 mg tablet Take 1 Tablet by mouth daily. metFORMIN ER (GLUCOPHAGE XR) 500 mg tablet Take 2 Tablets by mouth two (2) times daily (with meals). glimepiride (AMARYL) 4 mg tablet Take 1 Tablet by mouth every morning. clobetasoL (TEMOVATE) 0.05 % ointment Apply  to affected area two (2) times a day. Apply to right ankle    celecoxib (CELEBREX) 200 mg capsule Take 1 Capsule by mouth two (2) times daily as needed for Pain. lisinopriL (PRINIVIL, ZESTRIL) 2.5 mg tablet Take 2.5 mg by mouth in the morning.    ketoconazole (NIZORAL) 2 % shampoo WORK INTO SCALP AND ALLOW TO SIT FOR 5 MINUTES BEFORE RINSING THREE TIMES WEEKLY. finasteride (PROPECIA) 1 mg tablet Do not crush, chew, or split. Take 1 by mouth daily. (Patient not taking: No sig reported)    propranoloL (INDERAL) 20 mg tablet Take 1 Tablet by mouth daily. (Patient not taking: Reported on 8/10/2022)    omeprazole (PRILOSEC) 40 mg capsule TAKE 1 CAP BY MOUTH DAILY (BEFORE BREAKFAST). traZODone (DESYREL) 50 mg tablet Take 1 Tablet by mouth nightly as needed for Sleep. (Patient not taking: No sig reported)    BD Insulin Syringe Ultra-Fine 1 mL 31 gauge x 5/16 syrg 1 EACH BY DOES NOT APPLY ROUTE EVERY SATURDAY FOR 12 DOSES. TO BE USED FOR METHOTREXATE SOLUTION    atorvastatin (LIPITOR) 40 mg tablet Take 1 Tab by mouth daily. acetaminophen (TYLENOL) 500 mg tablet TAKE 1 TO 2 TABLETS BY MOUTH 3 TIMES A DAY AS NEEDED FOR PAIN     No current facility-administered medications for this visit.        ALLERGIES    Allergies   Allergen Reactions    Iodinated Contrast Media Shortness of Breath       PHYSICAL EXAMINATION    Visit Vitals  Resp 16   Wt 210 lb (95.3 kg) BMI 31.93 kg/m²     General:  The patient is well developed, well nourished, alert, and in no apparent distress. Eyes: Sclera are anicteric. No conjunctival injection. HEENT:  Oropharynx is clear. No oral ulcers. Adequate salivary pooling. No cervical or supraclavicular lymphadenopathy. Lungs:  Clear to auscultation bilaterally, without wheeze or stridor. Normal respiratory effort. Cor:  Regular rate and rhythm. No murmurs rubs or gallops. Abdomen: Soft, non-tender, without hepatomegaly or masses. Extremities: No calf tenderness or edema. Warm and well perfused. Skin:  Stable psoriaform plaque proximal to right lateral achilles. No current nail abnormalities. Neuro: Nonfocal, no foot or wrist drop  Musculoskeletal:    A comprehensive musculoskeletal exam was performed for all joints of each upper and lower extremity and assessed for swelling, tenderness and range of motion. Results are documented as below:  Similar right basilar neck pain most prominently on full leftward rotation. Right elbow limited by ~30deg from full extension, mild joint line  tenderness, still no warmth or effusion, wearing compression sleeve. Tenderness ángela right PIP3 and PIP4 without synovitis today. No evidence of synovitis in the small joints of the hands, wrists, shoulders, elbows, hips, knees or ankles. DATA REVIEW    Laboratory   8/13/22: Urine glucose >1,000, CRP <0.29 mg/dL, ESR 3, Lipase 222, Cr 0.87, AST 14, Alk phos 122, ALT 36, Tbili 0.5, CBC WNL  3/9/22: ESR 2, Cr 0.92, LFT WNL, CBC WNL, CRP <1 mg/L  10/14/22: Hep Be Ab neg, Hep C virus Ab <0.1, Hep B core Ab IgM neg, Hep B Core Ab total neg, Hep B surface AB non reactive, Hep B surface Ag screen neg, QuantiFERON plus neg  Recent laboratory results were reviewed, summarized, and discussed with the patient. Imaging    Musculoskeletal Ultrasound    None    Radiographs        XR SHOULDER RT AP/LAT MIN 2 V (5/2/22): No acute abnormality.      Bilateral Hand 10/12/2021: LEFT: no fracture, dislocation or other acute osseous or articular abnormality. The soft tissues are within normal limits. Joint space narrowing and erosive changes are noted predominantly in the third and fourth PIP joints, progressive compared to the prior exam. RIGHT: no fracture or other acute osseous or articular abnormality. The soft tissues are within normal limits. Mild joint space narrowing is now noted in the DIP and PIP joints of the second through fourth digits. Bilateral Hand 5/29/2019: LEFT: No fracture or dislocation on plain film. No joint space narrowing. No joint space erosion or periosteal reaction. Alignment is within normal limits. Bone mineralization is decreased. No soft tissue calcification. RIGHT: No fracture or dislocation on plain film. No joint space narrowing. There is no joint space erosion. There is subtle periostitis distal phalanx of the middle finger. Alignment is within normal limits. Bone mineralization is decreased. No soft tissue calcification. Chest 3/30/2019: Cardiac monitoring wires overlie the thorax. The cardiomediastinal and hilar contours are within normal limits. The pulmonary vasculature is within normal limits. The lungs and pleural spaces are clear. The visualized bones and upper abdomen are age-appropriate. Right Elbow 12/15/2014: a moderate right elbow effusion. A nondisplaced fracture of the radial head is suspected. No dislocation is shown. CT Imaging    CT ABD PELV WO CONT (8/13/22): No acute intraperitoneal process is identified. Chronic basilar pulmonary nodule on the right. Incidental and/or nonemergent findings are as described in detail above. CT Head without contrast 9/10/2010: normal ventricles and basal cisterns. No intracranial masses or hemorrhages are seen. No focal intraparenchymal low density abnormalities are seen.     MR Imaging    MRI Right Elbow without contrast 12/22/2014: there is a large complex appearing elbow joint effusion. There is  diffuse chondral thinning. Mild diffuse osseous edema is shown. The elbow collateral ligaments are intact. No tendon derangement is demonstrated. No soft tissue mass is shown. DXA     None    PROCEDURE     Right Elbow Kenalog 40 mg IA. . (12/17/2020)     ASSESSMENT AND PLAN    This is a follow-up visit for Mr. Deon Zhong for psoriatic arthritis with persistent right elbow and hand arthralgias, and stable right ankle plaque, still within a month of his start of Tremfya. Nurse reviewed injeciton technique with him today using demo injector, supplied patient sample to replace his initial dose which would not count as an effective dose as the majority sounds to have been lost.    Instructed patient to resume methotrexate though will need to trend low-grade alkPhos elevation, and will attempt to taper this quickly as symptoms allow. 1. Psoriatic arthritis ([address]  - Re-dosing for 2nd loading dose on 11/5, then resume q2mo maintenance Tremfya injections  - Resume methotrexate 25mg subQ weekly  - C REACTIVE PROTEIN, QT; Future  - CBC WITH AUTOMATED DIFF; Future  - METABOLIC PANEL, COMPREHENSIVE; Future  - SED RATE (ESR); Future  - guselkumab (Tremfya) 100 mg/mL atIn; 1 mL by SubCUTAneous route once for 1 dose. Dispense: 1 Each; Refill: 0    2. Long-term use of immunosuppressant medication  - C REACTIVE PROTEIN, QT; Future  - CBC WITH AUTOMATED DIFF; Future  - METABOLIC PANEL, COMPREHENSIVE; Future  - SED RATE (ESR); Future    Patient Instructions   1) Take Tremfya injection November 5 then resume injections every other month. Enroll in the [facility] patient assistance program and reach out and ask about getting a replacement injection for the first one. The link is:  http://AgeCheq/     2) Take 1mL of sub cutaneous Methotrexate one time per week with daily folic acid. 3) Check labs in one month. 4) Follow up in 2 months.  Let me know if you have any  questions or concerns in the meantime.        TODAY'S ORDERS    Orders Placed This Encounter    C REACTIVE PROTEIN, QT    CBC WITH AUTOMATED DIFF    METABOLIC PANEL, COMPREHENSIVE    SED RATE (ESR)    guselkumab (Tremfya) 100 mg/mL atIn       Future Appointments   Date Time Provider Gale Brewer   11/9/2022  3:00 PM Cheryl Diallo Jolly, NP [facility]     Face to face time: 25 minutes  Note preparation and records review day of service: 20 minutes  Total provider time day of service: 45 minutes    This was scribed by Huong Neff in the presence of Dr. Toribio Ortiz MD S    Adult Rheumatology   [address]  [facility] [place], [address] [facility]   Phone [phone]  Fax [phone]

## 2022-11-03 ENCOUNTER — OFFICE VISIT (OUTPATIENT)
Dept: RHEUMATOLOGY | Age: 52
End: 2022-11-03
Payer: COMMERCIAL

## 2022-11-03 VITALS
WEIGHT: 211.2 LBS | RESPIRATION RATE: 16 BRPM | OXYGEN SATURATION: 93 % | BODY MASS INDEX: 32.11 KG/M2 | HEART RATE: 104 BPM | SYSTOLIC BLOOD PRESSURE: 100 MMHG | TEMPERATURE: 98.3 F | DIASTOLIC BLOOD PRESSURE: 69 MMHG

## 2022-11-03 DIAGNOSIS — M62.838 TRAPEZIUS MUSCLE SPASM: ICD-10-CM

## 2022-11-03 DIAGNOSIS — M54.2 CERVICALGIA: ICD-10-CM

## 2022-11-03 DIAGNOSIS — Z79.899 ONGOING USE OF POSSIBLY TOXIC MEDICATION: ICD-10-CM

## 2022-11-03 DIAGNOSIS — R74.8 ELEVATED ALKALINE PHOSPHATASE LEVEL: ICD-10-CM

## 2022-11-03 DIAGNOSIS — L40.50 PSORIATIC ARTHRITIS (HCC): Primary | ICD-10-CM

## 2022-11-03 RX ORDER — APREMILAST 30 MG
1 KIT ORAL SEE ADMIN INSTRUCTIONS
Qty: 27 TABLET | Refills: 0 | Status: SHIPPED | COMMUNITY
Start: 2022-11-03

## 2022-11-03 RX ORDER — APREMILAST 30 MG/1
30 TABLET, FILM COATED ORAL 2 TIMES DAILY
Qty: 56 TABLET | Refills: 0 | Status: SHIPPED | COMMUNITY
Start: 2022-11-03

## 2022-11-03 NOTE — PROGRESS NOTES
Chief Complaint   Patient presents with    Joint Pain     1. Have you been to the ER, urgent care clinic since your last visit? Hospitalized since your last visit? No    2. Have you seen or consulted any other health care providers outside of the [address] [place] [address] since your last visit? Include any pap smears or colon screening. No    Patient feels medication is not working, started back 2mo ago. Patient may speak about higher dosage.

## 2022-11-03 NOTE — PATIENT INSTRUCTIONS
1) Continue to take Tremfya injections every 2 months as scheduled. 2) Conquinine to take 1mL of sub cutaneous Methotrexate once per week with daily folic acid. 3) I am applying for Otezla. Follow the directions on the started pack and then take 2 pills twice per day. Stop taking this and let me know if you experience a worsening of mood of stomach upset. 4) Wear arthritis compression gloves for your hands. You cn buy these on [employer] or most pharmacies. 5) You can take 650mg of Tylenol up to 3 times a day for joint pain. You can also use Voltaren gel for hand pain as needed. 6) I am going to refer you to physical therapy. You can take this referral to wherever is most convenient to you. 7) Check labs before your next follow up.     8) Follow up in 6 weeks. Let me know if you have any questions or concerns in the meantime.

## 2022-11-03 NOTE — PROGRESS NOTES
REASON FOR VISIT    This is an-office Rheumatology follow-up visit for Mr. Marianna Flynn for     ICD-10-CM   1. [facility] L40.50   2. Psoriasis L40.9       Spondyloarthritis phenotype includes:  Anti-CCP positive: no  Rheumatoid factor positive: no  HLA-B27 positive: no  Inflammatory Back Pain: no  Erosive disease: yes  Sacroiliitis: no  Ankylosis: no  Psoriasis: yes  Enthesitis: no  Dactylitis: no  Nail Pitting: no  Onycholysis: no  Splinter Hemorrhage: no  Extra-articular manifestations include: none  SAPHO: no    Immunosuppression Screening (10/14/2021): Quantiferon TB: negative  PPD:  Not performed  Hepatitis B: negative  Hepatitis C: negative    Therapy History includes:  Current NSAIDs include: Celebrex 200 mg twice daily  Current DMARD include: methotrexate 25 mg SubQ every Friday (6/24/2020 to 7/2021; 10/14/2021 to present), Tremfya 100mg q2mo (9/22- )  Prior DMARD therapy includes: sulfasalazine 1000 mg twice daily (6/27/2012 to 8/13/2014; 12/02/2014 to 500 mg twice daily;10/02/2014 to 12/02/2014), methotrexate 20 mg every Saturday (8/13/2014 to 5/27/2015; insurance coverage, 6/28/2019 to 7/28/2019; did not refill, 8/29/2019 to 6/24/2020),  Enbrel, Humira, Remicade, Otezla 30 mg twice daily (5/2015 to 6/2018), Stelara, Cosentyx 300 mg every 30 days (6/2018 to 9/2019; loss of patient assistance, 1/03/2020 to 9/16/2020),  Taltz 80 mg every 4 weeks (9/17/2020 to 7/2021; 10/2021 to 8/22, GI upset)  The following DMARDs have been ineffective: sulfasalazine, methotrexate PO, Otezla, Enbrel, Humira, Remicade, Stelara, Cosentyx   The following DMARDs were stopped because of side effects: none    HISTORY OF PRESENT ILLNESS    Mr. Marianna Flynn returns for an in-office follow-up visit. Pt still takes 1mL of subQ methotrexate once weekly with daily folic acid. He also gets Tremfya injections once every 2 months but he does not know when his next injection is due. He has taken 2 injections so far.  He does not think that thee medications are helping his pain much. Pt still has bilateral hand pain. He says that when he touches the pillow with his hand at night he has pain. Pt has neck pain. He has not worked with physical therapy for his neck. Pt says that his itchy ankle rash is not clearing up. Pt denies hx of blood clots. REVIEW OF SYSTEMS    A comprehensive review of systems was performed and pertinent results are documented in the HPI, review of systems is otherwise non-contributory. PAST MEDICAL HISTORY    He has a past medical history of Arthritis, Diabetes ([facility] (5/27/2009), Elevated liver enzymes, Hypercholesterolemia, Psoriasis (5/27/2009), and Psoriatic arthritis ([facility] (5/27/2009). FAMILY HISTORY    His family history includes Asthma in his sister; Diabetes in his father and paternal grandmother; No Known Problems in his brother and mother. SOCIAL HISTORY    He reports that he has been smoking cigarettes. He has a 10.00 pack-year smoking history. He has never used smokeless tobacco. He reports that he does not drink alcohol and does not use drugs. MEDICATIONS    Current Outpatient Medications   Medication Sig    Trulicity 1.5 VC/8.7 mL sub-q pen INJECT 0.5 ML BY SUBCUTANEOUS ROUTE EVERY SEVEN (7) DAYS. guselkumab 100 mg/mL atIn 100 mg by SubCUTAneous route every two (2) months. For maintenance, to begin 8 weeks after 2nd loading dose. methotrexate 25 mg/mL chemo injection 1 mL by SubCUTAneous route Every Friday. folic acid (FOLVITE) 1 mg tablet Take 3 Tablets by mouth in the morning. empagliflozin (JARDIANCE) 25 mg tablet Take 1 Tablet by mouth daily. metFORMIN ER (GLUCOPHAGE XR) 500 mg tablet Take 2 Tablets by mouth two (2) times daily (with meals). glimepiride (AMARYL) 4 mg tablet Take 1 Tablet by mouth every morning. clobetasoL (TEMOVATE) 0.05 % ointment Apply  to affected area two (2) times a day.  Apply to right ankle    celecoxib (CELEBREX) 200 mg capsule Take 1 Capsule by mouth two (2) times daily as needed for Pain. lisinopriL (PRINIVIL, ZESTRIL) 2.5 mg tablet Take 2.5 mg by mouth in the morning.    ketoconazole (NIZORAL) 2 % shampoo WORK INTO SCALP AND ALLOW TO SIT FOR 5 MINUTES BEFORE RINSING THREE TIMES WEEKLY. omeprazole (PRILOSEC) 40 mg capsule TAKE 1 CAP BY MOUTH DAILY (BEFORE BREAKFAST). BD Insulin Syringe Ultra-Fine 1 mL 31 gauge x 5/16 syrg 1 EACH BY DOES NOT APPLY ROUTE EVERY SATURDAY FOR 12 DOSES. TO BE USED FOR METHOTREXATE SOLUTION    atorvastatin (LIPITOR) 40 mg tablet Take 1 Tab by mouth daily. acetaminophen (TYLENOL) 500 mg tablet TAKE 1 TO 2 TABLETS BY MOUTH 3 TIMES A DAY AS NEEDED FOR PAIN     No current facility-administered medications for this visit. ALLERGIES    Allergies   Allergen Reactions    Iodinated Contrast Media Shortness of Breath       PHYSICAL EXAMINATION    Visit Vitals  /69 (BP 1 Location: Left upper arm, BP Patient Position: Sitting, BP Cuff Size: Adult)   Pulse (!) 104   Temp 98.3 °F (36.8 °C) (Oral)   Resp 16   Wt 211 lb 3.2 oz (95.8 kg)   SpO2 93%   BMI 32.11 kg/m²       General:  The patient is well developed, well nourished, alert, and in no apparent distress. Eyes: Sclera are anicteric. No conjunctival injection. HEENT:  Oropharynx is clear. No oral ulcers. Adequate salivary pooling. No cervical or supraclavicular lymphadenopathy. Lungs:  Clear to auscultation bilaterally, without wheeze or stridor. Normal respiratory effort. Cor:  Regular rate and rhythm. No murmurs rubs or gallops. Abdomen: Soft, non-tender, without hepatomegaly or masses. Extremities: No calf tenderness or edema. Warm and well perfused. Skin:  slightly thinned psoriaform plaque right outer ankle. No current nail abnormalities.   Neuro: Nonfocal, no foot or wrist drop  Musculoskeletal:    A comprehensive musculoskeletal exam was performed for all joints of each upper and lower extremity and assessed for swelling, tenderness and range of motion. Results are documented as below:  basilar right neck muscular tenderness  right elbow limited from full extension by 30deg  trace right PIP2-3 synovitis, left PIP4 synovitis and tenderness   No evidence of synovitis in the small joints of the hands, wrists, shoulders, elbows, hips, knees or ankles. DATA REVIEW    Laboratory     8/13/22: Urine glucose >1,000, CRP <0.29 mg/dL, ESR 3, Lipase 222, Cr 0.87, AST 14, Alk phos 122, ALT 36, Tbili 0.5, CBC WNL  3/9/22: ESR 2, Cr 0.92, LFT WNL, CBC WNL, CRP <1 mg/L  10/14/22: Hep Be Ab neg, Hep C virus Ab <0.1, Hep B core Ab IgM neg, Hep B Core Ab total neg, Hep B surface AB non reactive, Hep B surface Ag screen neg, QuantiFERON plus neg  Recent laboratory results were reviewed, summarized, and discussed with the patient. Imaging    Musculoskeletal Ultrasound    None    Radiographs        XR SHOULDER RT AP/LAT MIN 2 V (5/2/22): No acute abnormality. Bilateral Hand 10/12/2021: LEFT: no fracture, dislocation or other acute osseous or articular abnormality. The soft tissues are within normal limits. Joint space narrowing and erosive changes are noted predominantly in the third and fourth PIP joints, progressive compared to the prior exam. RIGHT: no fracture or other acute osseous or articular abnormality. The soft tissues are within normal limits. Mild joint space narrowing is now noted in the DIP and PIP joints of the second through fourth digits. Bilateral Hand 5/29/2019: LEFT: No fracture or dislocation on plain film. No joint space narrowing. No joint space erosion or periosteal reaction. Alignment is within normal limits. Bone mineralization is decreased. No soft tissue calcification. RIGHT: No fracture or dislocation on plain film. No joint space narrowing. There is no joint space erosion. There is subtle periostitis distal phalanx of the middle finger. Alignment is within normal limits. Bone mineralization is decreased.  No soft tissue calcification. Chest 3/30/2019: Cardiac monitoring wires overlie the thorax. The cardiomediastinal and hilar contours are within normal limits. The pulmonary vasculature is within normal limits. The lungs and pleural spaces are clear. The visualized bones and upper abdomen are age-appropriate. Right Elbow 12/15/2014: a moderate right elbow effusion. A nondisplaced fracture of the radial head is suspected. No dislocation is shown. CT Imaging    CT ABD PELV WO CONT (8/13/22): No acute intraperitoneal process is identified. Chronic basilar pulmonary nodule on the right. Incidental and/or nonemergent findings are as described in detail above. CT Head without contrast 9/10/2010: normal ventricles and basal cisterns. No intracranial masses or hemorrhages are seen. No focal intraparenchymal low density abnormalities are seen. MR Imaging    MRI Right Elbow without contrast 12/22/2014: there is a large complex appearing elbow joint effusion. There is  diffuse chondral thinning. Mild diffuse osseous edema is shown. The elbow collateral ligaments are intact. No tendon derangement is demonstrated. No soft tissue mass is shown. DXA     None    PROCEDURE     Right Elbow Kenalog 40 mg IA. . (12/17/2020)     ASSESSMENT AND PLAN    This is a follow-up visit for Mr. Josie Flores for psoriatic arthritis with persistent right elbow and hand arthralgias, and stable right ankle plaque, with still moderate arthritis activity by CDAI and persistent ankle plaque on 3 months of Tremfya. Tolerating this well, but applying for addition of Otezla. The use of Merrily July concurrently to other biologics is well-documented an a safe option for treatment-refractory patients (Curr Rheumatol Rev  . 2019;15(3):234-237). 1. [facility]  - apremilast (Otezla) 30 mg tab; Take 30 mg by mouth two (2) times a day. Indications: psoriasis associated with arthritis  Dispense: 56 Tablet;  Refill: 0  - apremilast Gaby Rhodes Starter) 10 mg (4)-20 mg (4)-30 mg(19) DsPk; Take 1 Package by mouth See Admin Instructions. Indications: psoriasis associated with arthritis  Dispense: 27 Tablet; Refill: 0  - REFERRAL TO PHYSICAL THERAPY  - C REACTIVE PROTEIN, QT; Future  - CBC WITH AUTOMATED DIFF; Future  - METABOLIC PANEL, COMPREHENSIVE; Future  - SED RATE (ESR); Future  - GGT; Future    2. Elevated alkaline phosphatase level  - GGT; Future    3. Ongoing use of possibly toxic medication  - REFERRAL TO PHYSICAL THERAPY  - C REACTIVE PROTEIN, QT; Future  - CBC WITH AUTOMATED DIFF; Future  - METABOLIC PANEL, COMPREHENSIVE; Future  - SED RATE (ESR); Future  - GGT; Future    4. Cervicalgia  - REFERRAL TO PHYSICAL THERAPY    5. Trapezius muscle spasm  - REFERRAL TO PHYSICAL THERAPY      Patient Instructions   1) Continue to take Tremfya injections every 2 months as scheduled. 2) Conquinine to take 1mL of sub cutaneous Methotrexate once per week with daily folic acid. 3) I am applying for Otezla. Follow the directions on the started pack and then take 2 pills twice per day. Stop taking this and let me know if you experience a worsening of mood of stomach upset. 4) Wear arthritis compression gloves for your hands. You cn buy these on [employer] or most pharmacies. 5) You can take 650mg of Tylenol up to 3 times a day for joint pain. You can also use Voltaren gel for hand pain as needed. 6) I am going to refer you to physical therapy. You can take this referral to wherever is most convenient to you. 7) Check labs before your next follow up.     8) Follow up in 6 weeks. Let me know if you have any questions or concerns in the meantime.        TODAY'S ORDERS    Orders Placed This Encounter    C REACTIVE PROTEIN, QT    CBC WITH AUTOMATED DIFF    METABOLIC PANEL, COMPREHENSIVE    SED RATE (ESR)    GGT    REFERRAL TO PHYSICAL THERAPY    apremilast (Otezla) 30 mg tab    apremilast (Otezla Starter) 10 mg (4)-20 mg (4)-30 mg(19) DsPk Future Appointments   Date Time Provider Gale Brewer   11/3/2022  3:20 PM Gladys Morse MD AOCR BS AMB   11/9/2022  3:00 PM Heather Diallo NP PAFP BS AMB     Face to face time: 20 minutes  Note preparation and records review day of service: 22 minutes  Total provider time day of service: 42 minutes    This was scribed by Agapito Carolina in the presence of Dr. Ken Babin. The note was reviewed and amended personally, and I agree with the above information.     Jacqueline Duggan MD    Adult Rheumatology   [facility]  A Part of [facility], [address] [place] [address]   Phone [phone]  Fax [phone]

## 2022-11-09 ENCOUNTER — OFFICE VISIT (OUTPATIENT)
Dept: FAMILY MEDICINE CLINIC | Age: 52
End: 2022-11-09
Payer: COMMERCIAL

## 2022-11-09 VITALS
HEART RATE: 105 BPM | TEMPERATURE: 98.6 F | RESPIRATION RATE: 16 BRPM | DIASTOLIC BLOOD PRESSURE: 73 MMHG | BODY MASS INDEX: 33.13 KG/M2 | SYSTOLIC BLOOD PRESSURE: 119 MMHG | HEIGHT: 68 IN | OXYGEN SATURATION: 96 % | WEIGHT: 218.6 LBS

## 2022-11-09 DIAGNOSIS — Z23 ENCOUNTER FOR IMMUNIZATION: ICD-10-CM

## 2022-11-09 DIAGNOSIS — E11.65 UNCONTROLLED TYPE 2 DIABETES MELLITUS WITH HYPERGLYCEMIA (HCC): Primary | ICD-10-CM

## 2022-11-09 DIAGNOSIS — L40.50 PSORIATIC ARTHRITIS (HCC): ICD-10-CM

## 2022-11-09 DIAGNOSIS — Z91.89 10 YEAR RISK OF MI OR STROKE 7.5% OR GREATER: ICD-10-CM

## 2022-11-09 DIAGNOSIS — E78.2 MIXED HYPERLIPIDEMIA: ICD-10-CM

## 2022-11-09 DIAGNOSIS — K21.9 GASTROESOPHAGEAL REFLUX DISEASE, UNSPECIFIED WHETHER ESOPHAGITIS PRESENT: ICD-10-CM

## 2022-11-09 LAB — HBA1C MFR BLD HPLC: 7.1 %

## 2022-11-09 PROCEDURE — 90686 IIV4 VACC NO PRSV 0.5 ML IM: CPT | Performed by: NURSE PRACTITIONER

## 2022-11-09 PROCEDURE — 3051F HG A1C>EQUAL 7.0%<8.0%: CPT | Performed by: NURSE PRACTITIONER

## 2022-11-09 PROCEDURE — 99214 OFFICE O/P EST MOD 30 MIN: CPT | Performed by: NURSE PRACTITIONER

## 2022-11-09 PROCEDURE — 83036 HEMOGLOBIN GLYCOSYLATED A1C: CPT | Performed by: NURSE PRACTITIONER

## 2022-11-09 RX ORDER — GLIMEPIRIDE 4 MG/1
4 TABLET ORAL
Qty: 90 TABLET | Refills: 1 | Status: SHIPPED | OUTPATIENT
Start: 2022-11-09

## 2022-11-09 RX ORDER — METFORMIN HYDROCHLORIDE 500 MG/1
1000 TABLET, EXTENDED RELEASE ORAL 2 TIMES DAILY WITH MEALS
Qty: 180 TABLET | Refills: 1 | Status: SHIPPED | OUTPATIENT
Start: 2022-11-09

## 2022-11-09 RX ORDER — FAMOTIDINE 20 MG/1
20 TABLET, FILM COATED ORAL
Qty: 60 TABLET | Refills: 1 | Status: SHIPPED | OUTPATIENT
Start: 2022-11-09

## 2022-11-09 RX ORDER — ATORVASTATIN CALCIUM 40 MG/1
40 TABLET, FILM COATED ORAL DAILY
Qty: 90 TABLET | Refills: 3 | Status: SHIPPED | OUTPATIENT
Start: 2022-11-09

## 2022-11-09 RX ORDER — DULAGLUTIDE 1.5 MG/.5ML
1.5 INJECTION, SOLUTION SUBCUTANEOUS
Qty: 4 EACH | Refills: 2 | Status: SHIPPED | OUTPATIENT
Start: 2022-11-09

## 2022-11-09 RX ORDER — OMEPRAZOLE 40 MG/1
40 CAPSULE, DELAYED RELEASE ORAL
Qty: 90 CAPSULE | Refills: 1 | Status: SHIPPED | OUTPATIENT
Start: 2022-11-09

## 2022-11-09 RX ORDER — LISINOPRIL 2.5 MG/1
2.5 TABLET ORAL DAILY
Qty: 90 TABLET | Refills: 1 | Status: SHIPPED | OUTPATIENT
Start: 2022-11-09

## 2022-11-09 NOTE — PROGRESS NOTES
Chief Complaint   Patient presents with    Diabetes         1. \"Have you been to the ER, urgent care clinic since your last visit? Hospitalized since your last visit? \" Yes When: 8/13/22 Where: [facility] Reason for visit: abd pain    2. \"Have you seen or consulted any other health care providers outside of the [address] [place] [address] since your last visit? \" No     3. For patients over 45: Has the patient had a colonoscopy?  Yes - no Care Gap present       3 most recent PHQ Screens 11/9/2022   Little interest or pleasure in doing things Not at all   Feeling down, depressed, irritable, or hopeless Not at all   Total Score PHQ 2 0       Health Maintenance Due   Topic Date Due    Hepatitis B Vaccine (1 of 3 - Risk 3-dose series) Never done    DTaP/Tdap/Td series (1 - Tdap) Never done    Eye Exam Retinal or Dilated  04/17/2014    COVID-19 Vaccine (3 - Booster for Pfizer series) 06/28/2021    Shingrix Vaccine Age 50> (1 of 2) Never done    MICROALBUMIN Q1  03/31/2022    Lipid Screen  03/31/2022

## 2022-11-09 NOTE — PROGRESS NOTES
[facility] Note     Marzena Durham (: 1970) is a 47 y.o. male, established patient, here for evaluation of the following chief complaint(s):  Diabetes       ASSESSMENT/PLAN:  1. Uncontrolled type 2 diabetes mellitus with hyperglycemia (HCC)  - Improving A1C  Lab Results   Component Value Date/Time    Hemoglobin A1c 7.6 (H) 2021 12:16 PM    Hemoglobin A1c (POC) 7.1 2022 03:24 PM       - LIPID PANEL; Future  -     MICROALBUMIN, UR, RAND W/ MICROALB/CREAT RATIO; Future  -     AMB POC HEMOGLOBIN A1C  -     atorvastatin (LIPITOR) 40 mg tablet; Take 1 Tablet by mouth daily. , Normal, Disp-90 Tablet, R-3DX Code Needed  . -     empagliflozin (JARDIANCE) 25 mg tablet; Take 1 Tablet by mouth daily. , Normal, Disp-90 Tablet, R-1  -     glimepiride (AMARYL) 4 mg tablet; Take 1 Tablet by mouth every morning., Normal, Disp-90 Tablet, R-1  -     lisinopriL (PRINIVIL, ZESTRIL) 2.5 mg tablet; Take 1 Tablet by mouth daily. , Normal, Disp-90 Tablet, R-1  -     metFORMIN ER (GLUCOPHAGE XR) 500 mg tablet; Take 2 Tablets by mouth two (2) times daily (with meals). , Normal, Disp-180 Tablet, R-1DX Code Needed  . -     dulaglutide (Trulicity) 1.5 WN/8.1 mL sub-q pen; 0.5 mL by SubCUTAneous route every seven (7) days. , Normal, Disp-4 Each, R-2    2. Psoriatic arthritis ([facility]  Comments:  Managed by Rheumatology    3. Mixed hyperlipidemia  -     LIPID PANEL; Future  -     atorvastatin (LIPITOR) 40 mg tablet; Take 1 Tablet by mouth daily. , Normal, Disp-90 Tablet, R-3DX Code Needed  . 4. 10 year risk of MI or stroke 7.5% or greater  -     atorvastatin (LIPITOR) 40 mg tablet; Take 1 Tablet by mouth daily. , Normal, Disp-90 Tablet, R-3DX Code Needed  . 5. Gastroesophageal reflux disease, unspecified whether esophagitis present  - Would recommend weaning chronic omeprazole use     -  omeprazole (PRILOSEC) 40 mg capsule; Take 1 Capsule by mouth Daily (before breakfast). , Normal, Disp-90 Capsule, R-1  -     famotidine (PEPCID) 20 mg tablet; Take 1 Tablet by mouth two (2) times daily as needed for Heartburn, Gastroesophageal Reflux Disease (GERD) or Indigestion. , Normal, Disp-60 Tablet, R-1    6. Encounter for immunization  -     [place] IMMUNIZ ADMIN,1 SINGLE/COMB VAC/TOXOID  -     INFLUENZA, FLUARIX, FLULAVAL, FLUZONE (AGE 6 MO+), AFLURIA(AGE 3Y+) IM, PF, 0.5 ML      Return in about 6 months (around 5/9/2023), or if symptoms worsen or fail to improve. SUBJECTIVE/OBJECTIVE:    Javy Moreno is a 47 y.o. male seen today for DM, GERD, HLD    Cardiovascular Review:  He has diabetes, hypertension, hyperlipidemia, and obesity. Diet and Lifestyle: generally follows a low fat low cholesterol diet, sedentary  Home BP Monitoring: is not measured at home. Pertinent ROS: taking medications as instructed, no medication side effects noted, no TIA's, no chest pain on exertion, no dyspnea on exertion, no swelling of ankles. Diabetes Mellitus:  Diabetic ROS - medication compliance: compliant all of the time, diabetic diet compliance: compliant most of the time, home glucose monitoring: is performed sporadically. Further diabetic ROS: no polyuria or polydipsia, no chest pain, dyspnea or TIA's, no numbness, tingling or pain in extremities. Last eye exam approximately <1 yr ago. REVIEW OF SYSTEMS:    Review of Systems   Constitutional: Negative. HENT: Negative. Respiratory: Negative. Cardiovascular: Negative. Gastrointestinal: Negative. Genitourinary: Negative. Musculoskeletal:  Positive for arthralgias (Chronic pain in hands due to psoriatic arthritis) and joint swelling. Skin: Negative. Neurological: Negative.         VITAL SIGNS:    Wt Readings from Last 3 Encounters:   11/09/22 218 lb 9.6 oz (99.2 kg)   11/03/22 211 lb 3.2 oz (95.8 kg)   10/10/22 210 lb (95.3 kg)     Temp Readings from Last 3 Encounters:   11/09/22 98.6 °F (37 °C) (Temporal)   11/03/22 98.3 °F (36.8 °C) (Oral) 08/13/22 97.8 °F (36.6 °C)     BP Readings from Last 3 Encounters:   11/09/22 119/73   11/03/22 100/69   08/13/22 111/82     Pulse Readings from Last 3 Encounters:   11/09/22 (!) 105   11/03/22 (!) 104   08/13/22 82           PHYSICAL EXAMINATION:       General: Alert, cooperative, no distress  Respiratory: Breathing comfortably, in no acute respiratory distress. Clear to auscultation bilaterally. Cardiovascular: Regular rate and rhythm, S1, S2 normal, no murmur, click, rub or gallop. Extremities: no edema. Abdomen: Soft, non-tender, not distended. Bowel sounds normal. No masses or organomegaly. MSK: Extremities normal appearing, atraumatic, no effusion. Gait steady and unassisted. Skin: Skin color, texture, turgor normal. No rashes or lesions on exposed skin. Neurologic: A/Ox3  Psychiatric: Normal affect. Mood euthymic. Thoughts logical. Speech volume and speed normal            Treatment risks/benefits/costs/interactions/alternatives discussed with patient. Advised patient to call back or return to office if symptoms worsen/change/persist. If patient cannot reach us or should anything more severe/urgent arise he/she should proceed directly to the nearest emergency department. Discussed expected course/resolution/complications of diagnosis in detail with patient. Patient expressed understanding with the diagnosis and plan. An electronic signature was used to authenticate this note.   -- Yoshi Birmingham NP

## 2022-12-03 RX ORDER — APREMILAST 30 MG/1
30 TABLET, FILM COATED ORAL 2 TIMES DAILY
Qty: 60 TABLET | Refills: 11 | Status: SHIPPED | OUTPATIENT
Start: 2022-12-03

## 2022-12-05 ENCOUNTER — DOCUMENTATION ONLY (OUTPATIENT)
Dept: PHARMACY | Age: 52
End: 2022-12-05

## 2022-12-05 NOTE — PROGRESS NOTES
[facility] Update    Date: 12/05/22    Mars Pickard 1970     Medication: Ottie Pears 30 mg    Prior Authorization: through 12/5/2023    Pharmacy will call the patient to inform them to set up delivery.     [address]ABARI [address] at [address], [address]  phone: [phone]   fax: [phone]

## 2022-12-12 ENCOUNTER — LAB ONLY (OUTPATIENT)
Dept: FAMILY MEDICINE CLINIC | Age: 52
End: 2022-12-12

## 2022-12-12 DIAGNOSIS — E78.2 MIXED HYPERLIPIDEMIA: ICD-10-CM

## 2022-12-12 DIAGNOSIS — E11.65 UNCONTROLLED TYPE 2 DIABETES MELLITUS WITH HYPERGLYCEMIA (HCC): ICD-10-CM

## 2022-12-13 LAB
CHOLEST SERPL-MCNC: 168 MG/DL
CREAT UR-MCNC: 111 MG/DL
HDLC SERPL-MCNC: 36 MG/DL
HDLC SERPL: 4.7 {RATIO} (ref 0–5)
LDLC SERPL CALC-MCNC: 105.2 MG/DL (ref 0–100)
MICROALBUMIN UR-MCNC: <0.5 MG/DL
MICROALBUMIN/CREAT UR-RTO: <5 MG/G (ref 0–30)
TRIGL SERPL-MCNC: 134 MG/DL (ref ?–150)
VLDLC SERPL CALC-MCNC: 26.8 MG/DL

## 2022-12-15 ENCOUNTER — OFFICE VISIT (OUTPATIENT)
Dept: RHEUMATOLOGY | Age: 52
End: 2022-12-15
Payer: COMMERCIAL

## 2022-12-15 VITALS
OXYGEN SATURATION: 96 % | TEMPERATURE: 98 F | DIASTOLIC BLOOD PRESSURE: 82 MMHG | HEART RATE: 99 BPM | SYSTOLIC BLOOD PRESSURE: 130 MMHG | RESPIRATION RATE: 16 BRPM

## 2022-12-15 DIAGNOSIS — L40.50 PSORIATIC ARTHRITIS (HCC): Primary | ICD-10-CM

## 2022-12-15 DIAGNOSIS — M62.838 TRAPEZIUS MUSCLE SPASM: ICD-10-CM

## 2022-12-15 DIAGNOSIS — M54.2 CERVICALGIA: ICD-10-CM

## 2022-12-15 DIAGNOSIS — M17.5 OTHER SECONDARY OSTEOARTHRITIS OF RIGHT KNEE: ICD-10-CM

## 2022-12-15 RX ORDER — GABAPENTIN 300 MG/1
300 CAPSULE ORAL
Qty: 30 CAPSULE | Refills: 2 | Status: SHIPPED | OUTPATIENT
Start: 2022-12-15

## 2022-12-15 NOTE — PATIENT INSTRUCTIONS
1) Continue to take one pill of Otezla twice daily as long as it is not worsening your mood. 2) Continue to take Tremfya injections as scheduled. 3) I am prescribing Gabapentin. Take 300mg at night before you go to sleep. 4) I am referring your to an orthopedic knee doctor. Call the number on the printout to schedule. 5) I am going to refer you to physical therapy. You can take this referral to wherever is most convenient to you. 6) Follow up in 2 months. Let me know if you have any questions or concerns in the meantime.

## 2022-12-15 NOTE — PROGRESS NOTES
REASON FOR VISIT    This is an-office Rheumatology follow-up visit for Mr. Elizabeth Vizcaino for     ICD-10-CM   1. Psoriatic arthritis ([facility] [address] L40.50   2. Psoriasis L40.9       Spondyloarthritis phenotype includes:  Anti-CCP positive: no  Rheumatoid factor positive: no  HLA-B27 positive: no  Inflammatory Back Pain: no  Erosive disease: yes  Sacroiliitis: no  Ankylosis: no  Psoriasis: yes  Enthesitis: no  Dactylitis: no  Nail Pitting: no  Onycholysis: no  Splinter Hemorrhage: no  Extra-articular manifestations include: none  SAPHO: no    Immunosuppression Screening (10/14/2021): Quantiferon TB: negative  PPD:  Not performed  Hepatitis B: negative  Hepatitis C: negative    Therapy History includes:  Current NSAIDs include: Celebrex 200 mg twice daily  Current DMARD include: methotrexate 25 mg SubQ every Friday (6/24/2020 to 7/2021; 10/14/2021 to present), Tremfya 100mg q2mo (9/22- )  Prior DMARD therapy includes: sulfasalazine 1000 mg twice daily (6/27/2012 to 8/13/2014; 12/02/2014 to 500 mg twice daily;10/02/2014 to 12/02/2014), methotrexate 20 mg every Saturday (8/13/2014 to 5/27/2015; insurance coverage, 6/28/2019 to 7/28/2019; did not refill, 8/29/2019 to 6/24/2020),  Enbrel, Humira, Remicade, Otezla 30 mg twice daily (5/2015 to 6/2018), Stelara, Cosentyx 300 mg every 30 days (6/2018 to 9/2019; loss of patient assistance, 1/03/2020 to 9/16/2020),  Taltz 80 mg every 4 weeks (9/17/2020 to 7/2021; 10/2021 to 8/22, GI upset)  The following DMARDs have been ineffective: sulfasalazine, methotrexate PO, Otezla, Enbrel, Humira, Remicade, Stelara, Cosentyx   The following DMARDs were stopped because of side effects: none    HISTORY OF PRESENT ILLNESS    Mr. Elizabeth Vizcaino returns for an in-office follow-up visit. LV 11/3/2022. Pt still takes Tremfya and he says that he has not felt any improvement on it. He also takes two pills of Michail Pearce a day, which is not giving him pain relief yet.  He also takes Celebrex, but he does not know if this helps at all. Pt complains of pain and swelling in his L knee. He also has R sided shoulder pain, lower back pain, and finger pain. He says that the amount of pain he has is worsening his mood and making him feel more depressed. Pt says that he is not depressed. He thinks that his worsened mood is due to his lack of sleep rather than his [place]. He says that his neck/Shoulder pain keeps him awake at night. Pt says that he is not active during the day because of his pain. Pt says that his ankle rash is unchanged. REVIEW OF SYSTEMS    A comprehensive review of systems was performed and pertinent results are documented in the HPI, review of systems is otherwise non-contributory. PAST MEDICAL HISTORY    He has a past medical history of Arthritis, Diabetes ([facility] [address] (5/27/2009), Elevated liver enzymes, Hypercholesterolemia, Psoriasis (5/27/2009), and Psoriatic arthritis ([facility] (5/27/2009). FAMILY HISTORY    His family history includes Asthma in his sister; Diabetes in his father and paternal grandmother; No Known Problems in his brother and mother. SOCIAL HISTORY    He reports that he has been smoking cigarettes. He has a 10.00 pack-year smoking history. He has never used smokeless tobacco. He reports that he does not drink alcohol and does not use drugs. MEDICATIONS    Current Outpatient Medications   Medication Sig    apremilast (Otezla) 30 mg tab Take 30 mg by mouth two (2) times a day. Indications: psoriasis associated with arthritis    atorvastatin (LIPITOR) 40 mg tablet Take 1 Tablet by mouth daily. empagliflozin (JARDIANCE) 25 mg tablet Take 1 Tablet by mouth daily. glimepiride (AMARYL) 4 mg tablet Take 1 Tablet by mouth every morning. lisinopriL (PRINIVIL, ZESTRIL) 2.5 mg tablet Take 1 Tablet by mouth daily. metFORMIN ER (GLUCOPHAGE XR) 500 mg tablet Take 2 Tablets by mouth two (2) times daily (with meals).     dulaglutide (Trulicity) 1.5 XC/5.9 mL sub-q pen 0.5 mL by SubCUTAneous route every seven (7) days. omeprazole (PRILOSEC) 40 mg capsule Take 1 Capsule by mouth Daily (before breakfast). famotidine (PEPCID) 20 mg tablet Take 1 Tablet by mouth two (2) times daily as needed for Heartburn, Gastroesophageal Reflux Disease (GERD) or Indigestion. apremilast (Otezla) 30 mg tab Take 30 mg by mouth two (2) times a day. Indications: psoriasis associated with arthritis    apremilast (Otezla Starter) 10 mg (4)-20 mg (4)-30 mg(19) DsPk Take 1 Package by mouth See Admin Instructions. Indications: psoriasis associated with arthritis    guselkumab 100 mg/mL atIn 100 mg by SubCUTAneous route every two (2) months. For maintenance, to begin 8 weeks after 2nd loading dose. methotrexate 25 mg/mL chemo injection 1 mL by SubCUTAneous route Every Friday. folic acid (FOLVITE) 1 mg tablet Take 3 Tablets by mouth in the morning. clobetasoL (TEMOVATE) 0.05 % ointment Apply  to affected area two (2) times a day. Apply to right ankle    celecoxib (CELEBREX) 200 mg capsule Take 1 Capsule by mouth two (2) times daily as needed for Pain.    ketoconazole (NIZORAL) 2 % shampoo WORK INTO SCALP AND ALLOW TO SIT FOR 5 MINUTES BEFORE RINSING THREE TIMES WEEKLY. BD Insulin Syringe Ultra-Fine 1 mL 31 gauge x 5/16 syrg 1 EACH BY DOES NOT APPLY ROUTE EVERY SATURDAY FOR 12 DOSES. TO BE USED FOR METHOTREXATE SOLUTION    acetaminophen (TYLENOL) 500 mg tablet TAKE 1 TO 2 TABLETS BY MOUTH 3 TIMES A DAY AS NEEDED FOR PAIN     No current facility-administered medications for this visit. ALLERGIES    Allergies   Allergen Reactions    Iodinated Contrast Media Shortness of Breath       PHYSICAL EXAMINATION    There were no vitals taken for this visit. General:  The patient is well developed, well nourished, alert, and in no apparent distress. Eyes: Sclera are anicteric. No conjunctival injection. HEENT:  Oropharynx is clear. No oral ulcers. Adequate salivary pooling.  No cervical or supraclavicular lymphadenopathy. Lungs:  Clear to auscultation bilaterally, without wheeze or stridor. Normal respiratory effort. Cor:  Regular rate and rhythm. No murmur rub or gallop. Abdomen: Soft, non-tender, without hepatomegaly or masses. Extremities: No calf tenderness or edema. Warm and well perfused. Skin:  No significant abnormalities. Neuro: Nonfocal  Musculoskeletal:    A comprehensive musculoskeletal exam was performed for all joints of each upper and lower extremity and assessed for swelling, tenderness and range of motion. Results are documented as below:  No evidence of synovitis in the small joints of the hands, wrists, shoulders, elbows, hips, knees or ankles. ___  General:  The patient is well developed, well nourished, alert, and in no apparent distress. Eyes: Sclera are anicteric. No conjunctival injection. HEENT:  Oropharynx is clear. No oral ulcers. Adequate salivary pooling. No cervical or supraclavicular lymphadenopathy. Lungs:  Clear to auscultation bilaterally, without wheeze or stridor. Normal respiratory effort. Cor:  Regular rate and rhythm. No murmurs rubs or gallops. Abdomen: Soft, non-tender, without hepatomegaly or masses. Extremities: No calf tenderness or edema. Warm and well perfused. Skin:  slightly thinned psoriaform plaque right outer ankle. No current nail abnormalities. Neuro: Nonfocal, no foot or wrist drop  Musculoskeletal:    A comprehensive musculoskeletal exam was performed for all joints of each upper and lower extremity and assessed for swelling, tenderness and range of motion. Results are documented as below:  basilar right neck muscular tenderness  right elbow limited from full extension by 30deg  trace right PIP2-3 synovitis, left PIP4 synovitis and tenderness   No evidence of synovitis in the small joints of the hands, wrists, shoulders, elbows, hips, knees or ankles.        DATA REVIEW    Laboratory   12/12/22: .2, Microalbumin/Cr <5, GGT 15, ESR 9, Cr 0.84, Tbili 0.5, AST 15, ALT 23, Alk phos 145, CBC WNL, CRP 23 mg/L  8/13/22: Urine glucose >1,000, CRP <0.29 mg/dL, ESR 3, Lipase 222, Cr 0.87, AST 14, Alk phos 122, ALT 36, Tbili 0.5, CBC WNL  3/9/22: ESR 2, Cr 0.92, LFT WNL, CBC WNL, CRP <1 mg/L  10/14/22: Hep Be Ab neg, Hep C virus Ab <0.1, Hep B core Ab IgM neg, Hep B Core Ab total neg, Hep B surface AB non reactive, Hep B surface Ag screen neg, QuantiFERON plus neg  Recent laboratory results were reviewed, summarized, and discussed with the patient. Imaging    Musculoskeletal Ultrasound    None    Radiographs        XR SHOULDER RT AP/LAT MIN 2 V (5/2/22): No acute abnormality. Bilateral Hand 10/12/2021: LEFT: no fracture, dislocation or other acute osseous or articular abnormality. The soft tissues are within normal limits. Joint space narrowing and erosive changes are noted predominantly in the third and fourth PIP joints, progressive compared to the prior exam. RIGHT: no fracture or other acute osseous or articular abnormality. The soft tissues are within normal limits. Mild joint space narrowing is now noted in the DIP and PIP joints of the second through fourth digits. Bilateral Hand 5/29/2019: LEFT: No fracture or dislocation on plain film. No joint space narrowing. No joint space erosion or periosteal reaction. Alignment is within normal limits. Bone mineralization is decreased. No soft tissue calcification. RIGHT: No fracture or dislocation on plain film. No joint space narrowing. There is no joint space erosion. There is subtle periostitis distal phalanx of the middle finger. Alignment is within normal limits. Bone mineralization is decreased. No soft tissue calcification. Chest 3/30/2019: Cardiac monitoring wires overlie the thorax. The cardiomediastinal and hilar contours are within normal limits. The pulmonary vasculature is within normal limits. The lungs and pleural spaces are clear.  The visualized bones and upper abdomen are age-appropriate. Right Elbow 12/15/2014: a moderate right elbow effusion. A nondisplaced fracture of the radial head is suspected. No dislocation is shown. CT Imaging    CT ABD PELV WO CONT (8/13/22): No acute intraperitoneal process is identified. Chronic basilar pulmonary nodule on the right. Incidental and/or nonemergent findings are as described in detail above. CT Head without contrast 9/10/2010: normal ventricles and basal cisterns. No intracranial masses or hemorrhages are seen. No focal intraparenchymal low density abnormalities are seen. MR Imaging    MRI Right Elbow without contrast 12/22/2014: there is a large complex appearing elbow joint effusion. There is  diffuse chondral thinning. Mild diffuse osseous edema is shown. The elbow collateral ligaments are intact. No tendon derangement is demonstrated. No soft tissue mass is shown. DXA     None    PROCEDURE     Right Elbow Kenalog 40 mg IA. . (12/17/2020)     ASSESSMENT AND PLAN    This is a follow-up visit for Mr. Virgilio Drake for psoriatic arthritis with persistent right elbow and hand arthralgias, and stable right ankle plaque, with still moderate arthritis activity by CDAI and persistent ankle plaque on 3 months of Tremfya. Tolerating this well, but applying for addition of Otezla. The use of Estel Michelle concurrently to other biologics is well-documented an a safe option for treatment-refractory patients (Curr Rheumatol Rev  . 2019;15(3):234-237). 1. Psoriatic arthritis ([facility] [address]  - apremilast (Otezla) 30 mg tab; Take 30 mg by mouth two (2) times a day. Indications: psoriasis associated with arthritis  Dispense: 56 Tablet; Refill: 0  - apremilast (Otezla Starter) 10 mg (4)-20 mg (4)-30 mg(19) DsPk; Take 1 Package by mouth See Admin Instructions. Indications: psoriasis associated with arthritis  Dispense: 27 Tablet;  Refill: 0  - REFERRAL TO PHYSICAL THERAPY  - C REACTIVE PROTEIN, QT; Future  - CBC WITH AUTOMATED DIFF; Future  - METABOLIC PANEL, COMPREHENSIVE; Future  - SED RATE (ESR); Future  - GGT; Future    2. Elevated alkaline phosphatase level  - GGT; Future    3. Ongoing use of possibly toxic medication  - REFERRAL TO PHYSICAL THERAPY  - C REACTIVE PROTEIN, QT; Future  - CBC WITH AUTOMATED DIFF; Future  - METABOLIC PANEL, COMPREHENSIVE; Future  - SED RATE (ESR); Future  - GGT; Future    4. Cervicalgia  - REFERRAL TO PHYSICAL THERAPY    5. Trapezius muscle spasm  - REFERRAL TO PHYSICAL THERAPY      There are no Patient Instructions on file for this visit. TODAY'S ORDERS    No orders of the defined types were placed in this encounter.         Future Appointments   Date Time Provider Gale Brewer   12/15/2022  3:40 PM Dario Kim MD AO[facility] BS AMB   5/10/2023  3:00 PM Klaus Mitchell NP PAFP BS AMB     Face to face time: minutes  Note preparation and records review day of service: minutes  Total provider time day of service: minutes

## 2022-12-15 NOTE — PROGRESS NOTES
Chief Complaint   Patient presents with    Joint Pain     1. Have you been to the ER, urgent care clinic since your last visit? Hospitalized since your last visit? No    2. Have you seen or consulted any other health care providers outside of the [address] [place] [address] since your last visit? Include any pap smears or colon screening.  No

## 2022-12-21 ENCOUNTER — OFFICE VISIT (OUTPATIENT)
Dept: ORTHOPEDIC SURGERY | Age: 52
End: 2022-12-21
Payer: COMMERCIAL

## 2022-12-21 VITALS — HEIGHT: 68 IN | BODY MASS INDEX: 31.83 KG/M2 | WEIGHT: 210 LBS

## 2022-12-21 DIAGNOSIS — M25.561 RIGHT KNEE PAIN, UNSPECIFIED CHRONICITY: Primary | ICD-10-CM

## 2022-12-21 DIAGNOSIS — L40.50 PSORIATIC ARTHRITIS (HCC): ICD-10-CM

## 2022-12-21 DIAGNOSIS — Z79.60 LONG-TERM USE OF IMMUNOSUPPRESSANT MEDICATION: ICD-10-CM

## 2022-12-21 PROCEDURE — 99203 OFFICE O/P NEW LOW 30 MIN: CPT | Performed by: PHYSICIAN ASSISTANT

## 2022-12-21 RX ORDER — DICLOFENAC SODIUM 75 MG/1
75 TABLET, DELAYED RELEASE ORAL 2 TIMES DAILY
Qty: 60 TABLET | Refills: 0 | Status: SHIPPED | OUTPATIENT
Start: 2022-12-21

## 2022-12-21 NOTE — PROGRESS NOTES
Rl Rodrigues (: 1970) is a 47 y.o. male patient here for evaluation of the following chief complaint(s):  Knee Pain (Right knee pain/swelling - started 1 mo ago /No fall or injury reported )         ASSESSMENT/PLAN:  Below is the assessment and plan developed based on review of pertinent history, physical exam, labs, studies, and medications. 1. Right knee pain, unspecified chronicity  -     XR KNEE RT 3 V; Future  -     diclofenac EC (VOLTAREN) 75 mg EC tablet; Take 1 Tablet by mouth two (2) times a day., Normal, Disp-60 Tablet, R-0Patient instructed to stop Celebrex      52-year-old male with right knee osteoarthritis. His x-rays were reviewed with him in detail today and his questions were answered to his satisfaction. We had a long discussion regarding options on how to treat his symptoms. His knee is quite swollen today and I offered to aspirate and inject cortisone into the joint but he would like to hold off on this for now. We also discussed physical therapy which he was not interested in. He is currently already taking Celebrex as an anti-inflammatory but states it is not helping. I have prescribed him diclofenac today to take instead of the Celebrex to see if this helps more with his symptoms. I have also referred him to a hand and shoulder specialist in our practice to take care of his other issues. We discussed calling the office to set up an appointment if he changes his mind on the aspiration and injection. I am happy to see him back as needed. No follow-ups on file. SUBJECTIVE/OBJECTIVE:  Rl Rodrigues (: 1970) is a 47 y.o. male who presents today for the following:  Chief Complaint   Patient presents with    Knee Pain     Right knee pain/swelling - started 1 mo ago   No fall or injury reported         HPI   52-year-old male, with a history of psoriatic arthritis, presenting today complaining of right knee pain.   He states he has had intermittent knee pain for many years now but is gotten much worse over the last month. He states it was no trauma or inciting event that may have triggered his symptoms. He states the pain is located all over in the knee and is worsened with activity. He states the knee is very swollen at this time. His primary care physician has referred him here today. He has been taking anti-inflammatories and gabapentin for symptoms without relief    IMAGING:  XR Results (most recent):  Results from Appointment encounter on 12/21/22    XR KNEE RT 3 V    Narrative  A/P, lateral, tunnel and sunrise views of the right knee were reviewed in the office today and demonstrated no periosteal reaction, no medullary lesions, no osteopenia, and severe lateral compartment arthritis, severe medial compartment arthritis and severe patellofemoral arthritis. Allergies   Allergen Reactions    Iodinated Contrast Media Shortness of Breath       Current Outpatient Medications   Medication Sig    diclofenac EC (VOLTAREN) 75 mg EC tablet Take 1 Tablet by mouth two (2) times a day.    gabapentin (NEURONTIN) 300 mg capsule Take 1 Capsule by mouth nightly. Max Daily Amount: 300 mg.    apremilast (Otezla) 30 mg tab Take 30 mg by mouth two (2) times a day. Indications: psoriasis associated with arthritis    atorvastatin (LIPITOR) 40 mg tablet Take 1 Tablet by mouth daily. empagliflozin (JARDIANCE) 25 mg tablet Take 1 Tablet by mouth daily. glimepiride (AMARYL) 4 mg tablet Take 1 Tablet by mouth every morning. lisinopriL (PRINIVIL, ZESTRIL) 2.5 mg tablet Take 1 Tablet by mouth daily. metFORMIN ER (GLUCOPHAGE XR) 500 mg tablet Take 2 Tablets by mouth two (2) times daily (with meals). dulaglutide (Trulicity) 1.5 WN/4.3 mL sub-q pen 0.5 mL by SubCUTAneous route every seven (7) days. omeprazole (PRILOSEC) 40 mg capsule Take 1 Capsule by mouth Daily (before breakfast).     famotidine (PEPCID) 20 mg tablet Take 1 Tablet by mouth two (2) times daily as needed for Heartburn, Gastroesophageal Reflux Disease (GERD) or Indigestion. apremilast (Otezla) 30 mg tab Take 30 mg by mouth two (2) times a day. Indications: psoriasis associated with arthritis    apremilast (Otezla Starter) 10 mg (4)-20 mg (4)-30 mg(19) DsPk Take 1 Package by mouth See Admin Instructions. Indications: psoriasis associated with arthritis    guselkumab 100 mg/mL atIn 100 mg by SubCUTAneous route every two (2) months. For maintenance, to begin 8 weeks after 2nd loading dose. methotrexate 25 mg/mL chemo injection 1 mL by SubCUTAneous route Every Friday. folic acid (FOLVITE) 1 mg tablet Take 3 Tablets by mouth in the morning. clobetasoL (TEMOVATE) 0.05 % ointment Apply  to affected area two (2) times a day. Apply to right ankle    celecoxib (CELEBREX) 200 mg capsule Take 1 Capsule by mouth two (2) times daily as needed for Pain.    ketoconazole (NIZORAL) 2 % shampoo WORK INTO SCALP AND ALLOW TO SIT FOR 5 MINUTES BEFORE RINSING THREE TIMES WEEKLY. BD Insulin Syringe Ultra-Fine 1 mL 31 gauge x 5/16 syrg 1 EACH BY DOES NOT APPLY ROUTE EVERY SATURDAY FOR 12 DOSES. TO BE USED FOR METHOTREXATE SOLUTION    acetaminophen (TYLENOL) 500 mg tablet TAKE 1 TO 2 TABLETS BY MOUTH 3 TIMES A DAY AS NEEDED FOR PAIN     No current facility-administered medications for this visit.        Past Medical History:   Diagnosis Date    Arthritis     Diabetes ([facility] [address] 5/27/2009    Elevated liver enzymes     resolved     Hypercholesterolemia     Psoriasis 5/27/2009    Psoriatic arthritis ([facility] [address] 5/27/2009        Past Surgical History:   Procedure Laterality Date    HX CATARACT REMOVAL      right       Family History   Problem Relation Age of Onset    Diabetes Paternal Grandmother     No Known Problems Mother     Diabetes Father     Asthma Sister     No Known Problems Brother         Social History     Tobacco Use    Smoking status: Every Day     Packs/day: 1.00     Years: 10.00     Pack years: 10.00     Types: Cigarettes    Smokeless tobacco: Never   Substance Use Topics    Alcohol use: No        Review of Systems  ROS    Positive for: Musculoskeletal  Last edited by Argenis Thompson on 12/21/2022 10:33 AM.         No flowsheet data found. Vitals:  Ht 5' 8\" (1.727 m)   Wt 210 lb (95.3 kg)   BMI 31.93 kg/m²    Body mass index is 31.93 kg/m². Physical Exam    Upon examination of the right knee, the patient is tender to palpation along the medial and lateral joint lines as well as the patellofemoral joint. There is an effusion present. They have crepitus of the patellofemoral joint with range of motion and discomfort with patella grind testing. The patient has no discomfort with Hailee´s maneuvers, and the knee is stable. They have limited range of motion. They have 5/5 strength, and are neurovascularly intact distally. There is no erythema, warmth or skin lesions present. Dr. Raffi Durham was available for immediate consult during this encounter. An electronic signature was used to authenticate this note.   -- Saida Woods PA-C

## 2022-12-23 RX ORDER — FOLIC ACID 1 MG/1
TABLET ORAL
Qty: 90 TABLET | Refills: 5 | Status: SHIPPED | OUTPATIENT
Start: 2022-12-23

## 2022-12-29 ENCOUNTER — HOSPITAL ENCOUNTER (OUTPATIENT)
Dept: PHYSICAL THERAPY | Age: 52
Discharge: HOME | End: 2022-12-29
Payer: COMMERCIAL

## 2022-12-29 PROCEDURE — 97161 PT EVAL LOW COMPLEX 20 MIN: CPT | Performed by: PHYSICAL THERAPIST

## 2022-12-29 PROCEDURE — 97140 MANUAL THERAPY 1/> REGIONS: CPT | Performed by: PHYSICAL THERAPIST

## 2022-12-29 NOTE — PROGRESS NOTES
PT INITIAL EVALUATION NOTE - [facility] 2-15    Patient Name: Titus Nguyen  GOYF:  : 1970  [x]  Patient  Verified  Payor: Tay  / Plan: [address] / Product Type: Managed Care Medicaid /    In time:315  Out time:400 P  Total Treatment Time (min): 45 (30 eval, 15 timed, 0 modality see below)  Total Timed Codes (min): 15   1:1 Treatment Time ([facility]/[place]): 15    Visit #:1    Treatment Area: Neck pain [M54.2]  Pain in both shoulders [M25.511, M25.512]    SUBJECTIVE  Any medication changes, allergies to medications, adverse drug reactions, diagnosis change, or new procedure performed?: [] No    [x] Yes (see summary sheet for update)  Chief complaint pt with neck and shoulder pain for many years  Location of pain: right neck, radiates down arm to hands  Description of pain: grabbing, knife, \"like the blood is not going\" \"my heart can't handle this anymore\"  Pain:   10/10 max 7/10 min 9/10 now     Aggravated by: sleeping  Eased by: gloves  Headaches:     [x] Yes   [] No  Dizziness:     [] Yes    [x] No  Jaw Pain:    [x] Yes    [] No left jaw  UE tingling/numbness:   [x] Yes   [] No  UE weakness:    [x] Yes    [] No   Blurred Vision/Double Vision [] Yes    [x] No   Previous treatment: yes for lumbar pain  Tests/injections:C7-T1: No herniation or stenosis. IMPRESSION:  No fracture. No significant central canal or neural foraminal stenosis  Prior level of function/activity level: Able to sleep with less pain  Patient goal: \"sleep better, it's damaging my brain because of the pain. 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 right   L Sidebending 20 stretch +pain   R Rotation 53 p!    L Rotation 43 p! Right shoulder AROM WNL all planes except:  Flexion: 150 p! Abd: 135 p! PROM right shoulder WNL tightness end range flexion and abd    Cervical myotomes WNL    Right shoulder MMT  ER: 4+/5 p! IR: 4+ p! Flexion and abd 4- p! Tenderness to palpation: right cervical paraspinals, UT, levator, rhomboid major/minor, infraspinatus    Special Tests:         Vertebral Artery:   [] R    [] L    [] +    [x] -         Alar Ligament:  [] R    [] L    [] +    [x] -       Transverse Ligament: [] R    [] L    [] +    [x] -       Spurling's:   [] R    [] L    [] +    [x] -       Distraction:   [] R    [] L    [] +    [x] -       Compression:  [] R    [] L    [] +    [x] -    Joint mobility:  hypomobility GHJ post glide right shoulder. Hypomobility noted cervical spine c2-c7, pain with testing all segments      OBJECTIVE  [x] Skin assessment post-treatment:  [x]intact []redness- no adverse reaction    []redness - adverse reaction:     15 min Manual Therapy: Cervical distraction. PROM cervical spine     Rationale: decrease pain, increase ROM, increase tissue extensibility and decrease trigger points to improve the patients ability to sleep          With   [] TE   [x] manual   [] self care    Patient Education: [x] Review HEP    [] Progressed/Changed HEP based on:   [] positioning   [] body mechanics   [] transfers   [x] Heat application  [x] other:  re: mechanism of injury/condition, role of physical therapy, prognosis for recovery, heat vs ice, activity modifications. Education re: Advised pt not sit for >30 min. Pt advised to get up and walk/stand for about 5 min every 30 min in order to decrease pain. Recommended pillow for supine sleeping       Pain Level (0-10 scale) post treatment: 8    ASSESSMENT/Changes in Function:     [x]  See Plan of [facility].  Nora PT, [place], [place]  PT License Number: [id number]   12/29/2022  3:16 PM

## 2022-12-29 NOTE — PROGRESS NOTES
[facility]  [address] [facility], [address] [facility]  Phone: [phone]  Fax: [phone]    Plan of Care/Statement of Necessity for Physical Therapy Services  2-15    Patient name: Sky Christensen  : 1970  Provider#: [id number]  Referral source: Jaky Adhikari MD      Medical/Treatment Diagnosis: Neck pain [M54.2]  Pain in both shoulders [M25.511, M25.512]     Prior Hospitalization: see medical history     Comorbidities: see evaluation  Prior Level of Function:see evaluation  Medications: Verified on Patient Summary List  Start of Care: 2022     Onset Date:see evaluation     The Plan of Care and following information is based on the information from the initial evaluation.     Assessment/ key information: Patient presents with signs and symptoms consistent with right cervical radiculopathy and will benefit from physical therapy to address deficits noted below in problem list.   Evaluation Complexity History LOW Complexity : Zero comorbidities / personal factors that will impact the outcome / POC; Examination LOW Complexity : 1-2 Standardized tests and measures addressing body structure, function, activity limitation and / or participation in recreation  ;Presentation LOW Complexity : Stable, uncomplicated  ;Clinical Decision Making Other outcome measures clinical judgment  LOW   Overall Complexity Rating: LOW   Problem List: pain affecting function, decrease ROM, decrease strength, impaired gait/ balance, decrease ADL/ functional abilitiies, decrease activity tolerance, decrease flexibility/ joint mobility, and decrease transfer abilities   Treatment Plan may include any combination of the following: Therapeutic exercise, Therapeutic activities, Neuromuscular re-education, Physical agent/modality, Manual therapy, Patient education and Self Care training  Patient / Family readiness to learn indicated by: asking questions, trying to perform skills and interest  Persons(s) to be included in education: patient (P)  Barriers to Learning/Limitations: None  Patient Goal (s): please see evaluation in [facility]  Patient Self Reported Health Status: please see paper chart  Rehabilitation Potential: good    Short Term Goals: To be accomplished in 5 treatments:  -Independent in HEP as evidenced on ability to perform at least 5 exercises from HEP using proper form without verbal cuing.   -Pain less than or equal to 8/10 at worst to allow patient to perform ADL's with greater ease  -Demostrate proper posture in order to decrease cervical pain  -Pt will report compliance with icing 1-2x/day in order to decrease inflammation  -Arm pain reduced by at least 25% to allow him to do ADL's with greater ease    Long Term Goals: To be accomplished in 3 months:  -AROM cervical spine pain free all planes to allow pt to drive with greater ease  -MMT greater than or equal to 4+/5 all planes to allow patient to perform ADL's  -Pt will report his sleep is improved by at least 25%    Frequency / Duration: Patient to be seen 2 times per week for 3 months. Patient/ Caregiver education and instruction: self care, activity modification and exercises    [x]  Plan of care has been reviewed with PTA    Certification Period: 12/29/2022 -  3/28/23    Cathy Kaur. Nora PT, DPT, CMTPT      99/66/5751 5:01 PM  PT License Number: [id number]  _____________________________________________________________________    I certify that the above Therapy Services are being furnished while the patient is under my care. I agree with the treatment plan and certify that this therapy is necessary.     [de-identified] Signature:____________________  Date:____________Time:_________

## 2023-01-03 ENCOUNTER — HOSPITAL ENCOUNTER (OUTPATIENT)
Dept: PHYSICAL THERAPY | Age: 53
Discharge: HOME | End: 2023-01-03
Payer: COMMERCIAL

## 2023-01-03 PROCEDURE — 97140 MANUAL THERAPY 1/> REGIONS: CPT | Performed by: PHYSICAL THERAPIST

## 2023-01-03 PROCEDURE — 97110 THERAPEUTIC EXERCISES: CPT | Performed by: PHYSICAL THERAPIST

## 2023-01-03 NOTE — PROGRESS NOTES
PT DAILY TREATMENT NOTE - [facility] 2-15    Patient Name: Josefina Irwin  CKVF:[id number]  : 1970  [x]  Patient  Verified  Payor: Tay 22 / Plan: [address] / Product Type: Managed Care Medicaid /    In time:530 P  Out time:610  Total Treatment Time (min): 40  Total Timed Codes (min): 30  1:1 Treatment Time ([facility]/[place] only): 30   Visit #:  2    Treatment Area: Neck pain [M54.2]  Pain in both shoulders [M25.511, M25.512]    SUBJECTIVE  Pain Level (0-10 scale): 9  Any medication changes, allergies to medications, adverse drug reactions, diagnosis change, or new procedure performed?: [x] No    [] Yes (see summary sheet for update)  Subjective functional status/changes:     No change after the first session    OBJECTIVE    Modality rationale: decrease inflammation to improve the patients ability to perform ADL's. Min Type Additional Details   10 [x]  Ice     []  Heat Position:supine, juliet LE's supported    Location:cervical spine     [x] Skin assessment post-treatment:  [x]intact []redness- no adverse reaction    []redness - adverse reaction:     15 min Therapeutic Exercise:  [x] See flow sheet :   Rationale: increase strength, improve coordination, improve balance, and increase proprioception to improve the patients ability to perform ADL's    15 min Manual Therapy: Cervical distraction. MFR juliet UT, levator, SCM, cervical paraspinals.   OA mobs Passive stretching UT juliet with OP     Rationale: decrease pain, increase tissue extensibility, and decrease trigger points to improve the patients ability to perform ADL's          With   [] TE   [] manual Patient Education: [x] Review HEP    [] Progressed/Changed HEP based on:   [] positioning   [] body mechanics   [] transfers   [] heat/ice application    [] other:      Other Objective/Functional Measures:       Pain Level (0-10 scale) post treatment: better    ASSESSMENT/Changes in Function:     Patient will continue to benefit from skilled PT services to modify and progress therapeutic interventions, address functional mobility deficits, address strength deficits, analyze and address soft tissue restrictions, analyze and cue movement patterns, and analyze and modify body mechanics/ergonomics to attain remaining goals. Progress towards goals / Updated goals:  Pt requires max cues for proper form on cervical retraction today. Improved pain at end of session    PLAN  []  Upgrade activities as tolerated     []  Continue plan of care  []  Update interventions per flow sheet       []  Discharge due to:_  []  Other:_      Jaylene Small.  Nora, PT 1/3/2023

## 2023-01-05 ENCOUNTER — HOSPITAL ENCOUNTER (OUTPATIENT)
Dept: PHYSICAL THERAPY | Age: 53
Discharge: HOME | End: 2023-01-05
Payer: COMMERCIAL

## 2023-01-05 PROCEDURE — 97110 THERAPEUTIC EXERCISES: CPT

## 2023-01-05 PROCEDURE — 97140 MANUAL THERAPY 1/> REGIONS: CPT

## 2023-01-05 NOTE — PROGRESS NOTES
PT DAILY TREATMENT NOTE - [facility] 2-15    Patient Name: Russ Hurst  Date:2023  : 1970  [x]  Patient  Verified  Payor: Tay 22 / Plan: [address] / Product Type: Managed Care Medicaid /    In time: 530 P  Out time: 610 P  Total Treatment Time (min): 40  Total Timed Codes (min): 30  1:1 Treatment Time ([facility]/[place] only): 30   Visit #:  3    Treatment Area: Neck pain [M54.2]  Pain in both shoulders [M25.511, M25.512]    SUBJECTIVE  Pain Level (0-10 scale): 8/10  Any medication changes, allergies to medications, adverse drug reactions, diagnosis change, or new procedure performed?: [x] No    [] Yes (see summary sheet for update)  Subjective functional status/changes:     Pt reports some improvement this visit. OBJECTIVE    Modality rationale: decrease inflammation to improve the patients ability to perform ADL's. Min Type Additional Details   10 [x]  Ice     []  Heat Position:supine, juliet LE's supported    Location:cervical spine     [x] Skin assessment post-treatment:  [x]intact []redness- no adverse reaction    []redness - adverse reaction:     15 min Therapeutic Exercise:  [x] See flow sheet :   Rationale: increase strength, improve coordination, improve balance, and increase proprioception to improve the patients ability to perform ADL's    15 min Manual Therapy: Cervical distraction. MFR juliet UT, levator, SCM, cervical paraspinals.   OA mobs Passive stretching UT juliet with OP     Rationale: decrease pain, increase tissue extensibility, and decrease trigger points to improve the patients ability to perform ADL's          With   [] TE   [] manual Patient Education: [x] Review HEP    [] Progressed/Changed HEP based on:   [] positioning   [] body mechanics   [] transfers   [] heat/ice application    [] other:      Other Objective/Functional Measures:       Pain Level (0-10 scale) post treatment: better    ASSESSMENT/Changes in Function:     Patient will continue to benefit from skilled PT services to modify and progress therapeutic interventions, address functional mobility deficits, address strength deficits, analyze and address soft tissue restrictions, analyze and cue movement patterns, and analyze and modify body mechanics/ergonomics to attain remaining goals. Progress towards goals / Updated goals:  Pt required cues for scapular depression with scap retractions this visit. Improved symptoms following treatment.     PLAN  []  Upgrade activities as tolerated     []  Continue plan of care  [x]  Update interventions per flow sheet       []  Discharge due to:_  []  Other:_      Alejandra Amaya, PTA 1/5/2023

## 2023-01-09 ENCOUNTER — HOSPITAL ENCOUNTER (OUTPATIENT)
Dept: PHYSICAL THERAPY | Age: 53
Discharge: HOME | End: 2023-01-09
Payer: COMMERCIAL

## 2023-01-09 PROCEDURE — 97110 THERAPEUTIC EXERCISES: CPT | Performed by: PHYSICAL THERAPIST

## 2023-01-09 PROCEDURE — 97140 MANUAL THERAPY 1/> REGIONS: CPT | Performed by: PHYSICAL THERAPIST

## 2023-01-09 NOTE — PROGRESS NOTES
PT DAILY TREATMENT NOTE - [facility] 2-15    Patient Name: Berto Rossi  Date:2023  : 1970  [x]  Patient  Verified  Payor: Tay 22 / Plan: [address] / Product Type: Managed Care Medicaid /    In time:305 P Out time: 350 P   Total Treatment Time (min): 45  Total Timed Codes (min): 35  1:1 Treatment Time ([facility]/[place] only): 35  Visit #:  4    Treatment Area: Neck pain [M54.2]  Pain in both shoulders [M25.511, M25.512]    SUBJECTIVE  Pain Level (0-10 scale):7/10  Any medication changes, allergies to medications, adverse drug reactions, diagnosis change, or new procedure performed?: [x] No    [] Yes (see summary sheet for update)  Subjective functional status/changes:     Much better, thankful for the therapy. OBJECTIVE    Modality rationale: decrease inflammation to improve the patients ability to perform ADL's. Min Type Additional Details   10 [x]  Ice     []  Heat Position:supine, juliet LE's supported    Location:cervical spine     [x] Skin assessment post-treatment:  [x]intact []redness- no adverse reaction    []redness - adverse reaction:     15 min Therapeutic Exercise:  [x] See flow sheet :   Rationale: increase strength, improve coordination, improve balance, and increase proprioception to improve the patients ability to perform ADL's    20 min Manual Therapy: Cervical distraction. MFR juliet UT, levator, SCM, cervical paraspinals.   OA mobs Passive stretching UT juliet with OP     Rationale: decrease pain, increase tissue extensibility, and decrease trigger points to improve the patients ability to perform ADL's          With   [] TE   [] manual Patient Education: [x] Review HEP    [] Progressed/Changed HEP based on:   [] positioning   [] body mechanics   [] transfers   [] heat/ice application    [] other:      Other Objective/Functional Measures:       Pain Level (0-10 scale) post treatment: better    ASSESSMENT/Changes in Function:     Patient will continue to benefit from skilled PT services to modify and progress therapeutic interventions, address functional mobility deficits, address strength deficits, analyze and address soft tissue restrictions, analyze and cue movement patterns, and analyze and modify body mechanics/ergonomics to attain remaining goals. Progress towards goals / Updated goals:  Improved soft tissue mobility UT and levator noted today compared to visit on 1/3. PLAN  []  Upgrade activities as tolerated     []  Continue plan of care  [x]  Update interventions per flow sheet       []  Discharge due to:_  []  Other:_      Ra Hall.  Nora, PT 1/9/2023

## 2023-01-12 ENCOUNTER — HOSPITAL ENCOUNTER (OUTPATIENT)
Dept: PHYSICAL THERAPY | Age: 53
Discharge: HOME | End: 2023-01-12
Payer: COMMERCIAL

## 2023-01-12 PROCEDURE — 97140 MANUAL THERAPY 1/> REGIONS: CPT

## 2023-01-12 PROCEDURE — 97110 THERAPEUTIC EXERCISES: CPT

## 2023-01-12 NOTE — PROGRESS NOTES
PT DAILY TREATMENT NOTE - [facility] 2-15    Patient Name: Scar Urrutia  Date:2023  : 1970  [x]  Patient  Verified  Payor: Tay 22 / Plan: [address] / Product Type: Managed Care Medicaid /    In time: 3:30 P Out time: 4:20 P   Total Treatment Time (min): 50  Total Timed Codes (min): 40  1:1 Treatment Time ([facility]/[place] only): 30  Visit #:  5    Treatment Area: Neck pain [M54.2]  Pain in both shoulders [M25.511, M25.512]    SUBJECTIVE  Pain Level (0-10 scale):7/10  Any medication changes, allergies to medications, adverse drug reactions, diagnosis change, or new procedure performed?: [x] No    [] Yes (see summary sheet for update)  Subjective functional status/changes:     Pt reports manual therapy techniques have been helping. OBJECTIVE    Modality rationale: decrease inflammation to improve the patients ability to perform ADL's. Min Type Additional Details   10 [x]  Ice     []  Heat Position:supine, juliet LE's supported    Location:cervical spine     [x] Skin assessment post-treatment:  [x]intact []redness- no adverse reaction    []redness - adverse reaction:     15 min Therapeutic Exercise:  [x] See flow sheet : updated per flowsheet   Rationale: increase strength, improve coordination, improve balance, and increase proprioception to improve the patients ability to perform ADL's    25 min Manual Therapy: Cervical distraction. MFR juliet UT, levator, SCM, cervical paraspinals.   OA mobs Passive stretching UT juliet with OP     Rationale: decrease pain, increase tissue extensibility, and decrease trigger points to improve the patients ability to perform ADL's          With   [] TE   [] manual Patient Education: [x] Review HEP    [] Progressed/Changed HEP based on:   [] positioning   [] body mechanics   [] transfers   [] heat/ice application    [] other:      Other Objective/Functional Measures:       Pain Level (0-10 scale) post treatment: better    ASSESSMENT/Changes in Function:     Patient will continue to benefit from skilled PT services to modify and progress therapeutic interventions, address functional mobility deficits, address strength deficits, analyze and address soft tissue restrictions, analyze and cue movement patterns, and analyze and modify body mechanics/ergonomics to attain remaining goals. Progress towards goals / Updated goals:  Continues to improve with soft tissue mobility UT and levator. Improved symptoms following treatment.     PLAN  []  Upgrade activities as tolerated     []  Continue plan of care  [x]  Update interventions per flow sheet       []  Discharge due to:_  []  Other:_      Randi Hernandez, MICHELLE 1/12/2023

## 2023-01-16 ENCOUNTER — APPOINTMENT (OUTPATIENT)
Dept: PHYSICAL THERAPY | Age: 53
End: 2023-01-16
Payer: COMMERCIAL

## 2023-01-17 ENCOUNTER — OFFICE VISIT (OUTPATIENT)
Dept: FAMILY MEDICINE CLINIC | Age: 53
End: 2023-01-17
Payer: COMMERCIAL

## 2023-01-17 VITALS
WEIGHT: 211 LBS | DIASTOLIC BLOOD PRESSURE: 75 MMHG | HEIGHT: 68 IN | SYSTOLIC BLOOD PRESSURE: 117 MMHG | HEART RATE: 92 BPM | RESPIRATION RATE: 17 BRPM | BODY MASS INDEX: 31.98 KG/M2 | TEMPERATURE: 97.3 F | OXYGEN SATURATION: 93 %

## 2023-01-17 DIAGNOSIS — E11.65 UNCONTROLLED TYPE 2 DIABETES MELLITUS WITH HYPERGLYCEMIA (HCC): ICD-10-CM

## 2023-01-17 DIAGNOSIS — R11.0 NAUSEA: Primary | ICD-10-CM

## 2023-01-17 DIAGNOSIS — L40.50 PSORIATIC ARTHRITIS (HCC): ICD-10-CM

## 2023-01-17 RX ORDER — PREDNISONE 20 MG/1
40 TABLET ORAL
Qty: 10 TABLET | Refills: 0 | Status: SHIPPED | OUTPATIENT
Start: 2023-01-17 | End: 2023-01-22

## 2023-01-17 RX ORDER — ONDANSETRON 8 MG/1
8 TABLET, ORALLY DISINTEGRATING ORAL
Qty: 14 TABLET | Refills: 0 | Status: SHIPPED | OUTPATIENT
Start: 2023-01-17 | End: 2023-01-31

## 2023-01-17 NOTE — PROGRESS NOTES
[facility]      Chief Complaint:     Chief Complaint   Patient presents with    Nausea     Patient states everything he does make him feel nausea, Every day since about two months ago Patient states is not even able to answer phone. Pain (Chronic)     Patient states have a lot of pain on finger and they are changing shapes. Frank Gallagher is a 47 y.o. male that presents for: multiple complaintrs      Assessment/Plan:     Diagnoses and all orders for this visit:    1. Nausea: unclear of etiology, unsure if its from medications (taking Trulicity and Metformin) or from GERD  -     ondansetron (ZOFRAN ODT) 8 mg disintegrating tablet; Take 1 Tablet by mouth every eight (8) hours as needed for Nausea or Vomiting for up to 14 days. - Continue Omeprazole  - Can try switching to Metformin to Januvia per patient request    2. Psoriatic arthritis ([facility] [address]  -     predniSONE (DELTASONE) 20 mg tablet; Take 2 Tablets by mouth daily (with breakfast) for 5 days. -Advised to follow up with Dr. Fred Parker    3. Uncontrolled type 2 diabetes mellitus with hyperglycemia ([facility] [address]  : Patient insisting on stopping Metformin because of its bad smell, requesting Januvia which he has been on before. Advised to keep a close eye on his blood sugar with this change and the Prednisone  -     SITagliptin phosphate (JANUVIA) 50 mg tablet; Take 1 Tablet by mouth daily. Follow up: Follow-up and Dispositions    Return for needs follow up with Jesús MILLER for pain and diabetes. Subjective:   HPI:  Frank Gallagher is a 47 y.o. male that presents for :    Patient is a poor historian, I believe he is confusing me with his PCP. Very hard to take history and determine which medicines he's actually taking. Nausea:  1 month- nausea. He is not sure which medications have been started recently. Taking Celecoxib recently for chronic pain.    Metformin smells too badly for him to take   No abdominal pain, vomiting, diarrhea, hematochezia, hematemesis  Taking Omeprazole 40 mg     Chronic pain: Psoriatic arthritis  Most pain in fingers as of now  Has chronic shoulder and knee pain  Has taken Prednisone in the past   On Methotrexate and Otezla, notes Noel Mcclellan was added recently and it hasn't helped much with pain but has made his mood a little low    DM:  L4P 7.1  Taking Trulicity and glimepiride       Health Maintenance:  Health Maintenance Due   Topic Date Due    Hepatitis B Vaccine (1 of 3 - Risk 3-dose series) Never done    DTaP/Tdap/Td series (1 - Tdap) Never done    Eye Exam Retinal or Dilated  04/17/2014    COVID-19 Vaccine (3 - Booster for Pfizer series) 06/28/2021    Shingles Vaccine (1 of 2) Never done        ROS:   See HPI      Past medical history, social history, and medications personally reviewed. Past Medical History:   Diagnosis Date    Arthritis     Diabetes ([facility] [address] 5/27/2009    Elevated liver enzymes     resolved     Hypercholesterolemia     Psoriasis 5/27/2009    Psoriatic arthritis ([facility] [address] 5/27/2009        Allergies personally reviewed. Allergies   Allergen Reactions    Iodinated Contrast Media Shortness of Breath          Objective:   Vitals reviewed. Visit Vitals  /75 (BP 1 Location: Left upper arm, BP Patient Position: Sitting, BP Cuff Size: Adult)   Pulse 92   Temp 97.3 °F (36.3 °C) (Temporal)   Resp 17   Ht 5' 8\" (1.727 m)   Wt 211 lb (95.7 kg)   SpO2 93%   BMI 32.08 kg/m²        Wt Readings from Last 3 Encounters:   01/17/23 211 lb (95.7 kg)   12/21/22 210 lb (95.3 kg)   11/09/22 218 lb 9.6 oz (99.2 kg)        Physical Exam  Physical Exam     Vitals: Reviewed. General: AO x 3. No distress. Not diaphoretic. No jaundice. No cyanosis. No pallor. HEENT: No thyromegaly or JVD  Cardio: Normal rate, regular rhythm. No murmur, rubs, or gallop. Pulmonary: Effort normal. No accessory muscle use. No wheezes, rales, or rhonchi. Abdominal: Soft. No rebound or guarding. No tenderness.  No distension. Extremities: No edema of lower extremities. Pulses 2+. Neurological: CN II-XII grossly intact. No focal deficits. Skin: No rash. I have reviewed pertinent labs results and other data. I have discussed the diagnosis with the patient and the intended plan as seen in the above orders. The patient has received an after-visit summary and questions were answered concerning future plans. I have discussed medication side effects and warnings with the patient as well.     Sandra Mckeon, DO  01/17/23

## 2023-01-19 ENCOUNTER — OFFICE VISIT (OUTPATIENT)
Dept: FAMILY MEDICINE CLINIC | Age: 53
End: 2023-01-19
Payer: COMMERCIAL

## 2023-01-19 ENCOUNTER — APPOINTMENT (OUTPATIENT)
Dept: PHYSICAL THERAPY | Age: 53
End: 2023-01-19
Payer: COMMERCIAL

## 2023-01-19 VITALS
BODY MASS INDEX: 31.64 KG/M2 | SYSTOLIC BLOOD PRESSURE: 119 MMHG | DIASTOLIC BLOOD PRESSURE: 72 MMHG | RESPIRATION RATE: 16 BRPM | WEIGHT: 208.8 LBS | HEIGHT: 68 IN | TEMPERATURE: 97.6 F | OXYGEN SATURATION: 97 % | HEART RATE: 93 BPM

## 2023-01-19 DIAGNOSIS — L40.50 PSORIATIC ARTHRITIS (HCC): ICD-10-CM

## 2023-01-19 DIAGNOSIS — M17.5 OTHER SECONDARY OSTEOARTHRITIS OF RIGHT KNEE: ICD-10-CM

## 2023-01-19 DIAGNOSIS — E11.65 UNCONTROLLED TYPE 2 DIABETES MELLITUS WITH HYPERGLYCEMIA (HCC): Primary | ICD-10-CM

## 2023-01-19 LAB — HBA1C MFR BLD HPLC: 6.5 %

## 2023-01-19 NOTE — PROGRESS NOTES
[facility] Note     Frank Gallagher (: 1970) is a 47 y.o. male, established patient, here for evaluation of the following chief complaint(s):  Diabetes, Arthritis, and Depression       ASSESSMENT/PLAN:  1. Uncontrolled type 2 diabetes mellitus with hyperglycemia ([address]  - Now controlled     -  AMB POC HEMOGLOBIN A1C  Lab Results   Component Value Date/Time    Hemoglobin A1c 7.6 (H) 2021 12:16 PM    Hemoglobin A1c (POC) 6.5 2023 03:18 PM   -Requested to discontinue metformin due to its odor. Januvia has been ordered but not started. Patient wanted reassurance that it was okay to start this medication  -Reports doing well on Trulicity. He feels like this is helped him. We will continue at current dose. - one ACE & statin  - No refills needed at todays encounter    2. Psoriatic arthritis [facility])  Assessment & Plan:  Managed by rheumatology Dr. Fred Parker  -Recommended follow-up with Dr. Fred Parker in regards to his newest medication change  as he does admit to episodic mood change as he describes as feeling \"low\". Should this medication have significant benefit for ongoing management of his psoriatic arthritis further consideration would be made towards antidepressant therapy should the patient wish to pursue.  -Continue prednisone as prescribed as he is on 2 of 5 days left    3. Other secondary osteoarthritis of right knee  -     REFERRAL TO PHYSICAL THERAPY  -Reviewed orthopedic consult from 2022 in which it was recommended for right knee aspiration and cortisone injection. Mr. Aubree Browne admits that he misunderstood the instructions and thought it was just the aspiration of the knee and not additional cortisone injection. He tells me that he has had joint aspirations before and which is quite painful. He is hesitant to move forward with this. Recommended physical therapy for his knee. Continue to wear knee brace as recommended by orthopedics. Ice as needed. Trial of diclofenac as recommended by orthopedics. Advised Mr. Kelsey Loya to consider returning to Ortho to discuss aspiration and cortisone injection. Return in about 3 months (around 4/19/2023) for Regular Follow-up. SUBJECTIVE/OBJECTIVE:    Simran Elizondo is a 47 y.o. male seen today for DM, right knee swelling and psoriatic arthritis. Cardiovascular Review:  He has diabetes, hypertension, and hyperlipidemia. Diet and Lifestyle: does not rigorously follow a diabetic diet, exercises sporadically  Home BP Monitoring: is not measured at home. Pertinent ROS: taking medications as instructed, no medication side effects noted, no TIA's, no chest pain on exertion, no dyspnea on exertion, no swelling of ankles. Diabetes Mellitus:  Diabetic ROS - medication compliance: compliant all of the time, diabetic diet compliance: compliant most of the time, home glucose monitoring: no logs available . Further diabetic ROS: no polyuria or polydipsia, no chest pain, dyspnea or TIA's, no numbness, tingling or pain in extremities. Last eye exam approximately <1 yr ago but not on file. REVIEW OF SYSTEMS:    Review of Systems   Constitutional: Negative. HENT: Negative. Respiratory: Negative. Cardiovascular: Negative. Gastrointestinal: Negative. Genitourinary: Negative. Musculoskeletal:  Positive for arthralgias (right knee swelling). Skin: Negative. Neurological: Negative.         VITAL SIGNS:    Wt Readings from Last 3 Encounters:   01/19/23 208 lb 12.8 oz (94.7 kg)   01/17/23 211 lb (95.7 kg)   12/21/22 210 lb (95.3 kg)     Temp Readings from Last 3 Encounters:   01/19/23 97.6 °F (36.4 °C) (Temporal)   01/17/23 97.3 °F (36.3 °C) (Temporal)   12/15/22 98 °F (36.7 °C) (Oral)     BP Readings from Last 3 Encounters:   01/19/23 119/72   01/17/23 117/75   12/15/22 130/82     Pulse Readings from Last 3 Encounters:   01/19/23 93   01/17/23 92   12/15/22 99           PHYSICAL EXAMINATION: General: Alert, cooperative, no distress  Respiratory: Breathing comfortably, in no acute respiratory distress. Clear to auscultation bilaterally. Cardiovascular: Regular rate and rhythm, S1, S2 normal, no murmur, click, rub or gallop. Extremities: no edema. Abdomen: Soft, non-tender, not distended. Bowel sounds normal. No masses or organomegaly. MSK: Small right knee effusion, Extremities normal appearing, atraumatic, no effusion. Gait steady and unassisted. Skin: Skin color, texture, turgor normal. No rashes or lesions on exposed skin. Neurologic: A/Ox3  Psychiatric: Normal affect. Mood euthymic. Thoughts logical. Speech volume and speed normal            Treatment risks/benefits/costs/interactions/alternatives discussed with patient. Advised patient to call back or return to office if symptoms worsen/change/persist. If patient cannot reach us or should anything more severe/urgent arise he/she should proceed directly to the nearest emergency department. Discussed expected course/resolution/complications of diagnosis in detail with patient. Patient expressed understanding with the diagnosis and plan. An electronic signature was used to authenticate this note.   -- Peter De Guzman NP

## 2023-01-19 NOTE — Clinical Note
Mr. Dav Barba endorsed episodic feeling low since starting his new medication. I advised him to contact you to discuss this further.

## 2023-01-19 NOTE — PROGRESS NOTES
Chief Complaint   Patient presents with    Diabetes    Arthritis    Depression         1. \"Have you been to the ER, urgent care clinic since your last visit? Hospitalized since your last visit? \" No    2. \"Have you seen or consulted any other health care providers outside of the [address] [place] [address] since your last visit? \" No     3. For patients over 45: Has the patient had a colonoscopy?  Yes - no Care Gap present       3 most recent PHQ Screens 1/19/2023   Little interest or pleasure in doing things Not at all   Feeling down, depressed, irritable, or hopeless Several days   Total Score PHQ 2 1       Health Maintenance Due   Topic Date Due    Hepatitis B Vaccine (1 of 3 - Risk 3-dose series) Never done    DTaP/Tdap/Td series (1 - Tdap) Never done    Eye Exam Retinal or Dilated  04/17/2014    COVID-19 Vaccine (3 - Booster for Pfizer series) 06/28/2021    Shingles Vaccine (1 of 2) Never done

## 2023-01-23 ENCOUNTER — HOSPITAL ENCOUNTER (OUTPATIENT)
Dept: PHYSICAL THERAPY | Age: 53
Discharge: HOME | End: 2023-01-23
Payer: COMMERCIAL

## 2023-01-23 DIAGNOSIS — M17.5 OTHER SECONDARY OSTEOARTHRITIS OF RIGHT KNEE: Primary | ICD-10-CM

## 2023-01-23 PROCEDURE — 97110 THERAPEUTIC EXERCISES: CPT

## 2023-01-23 PROCEDURE — 97140 MANUAL THERAPY 1/> REGIONS: CPT

## 2023-01-23 NOTE — PROGRESS NOTES
PT DAILY TREATMENT NOTE - [facility] 2-15    Patient Name: Izzy Nguyen  Date:2023  : 1970  [x]  Patient  Verified  Payor: Tay 22 / Plan: [address] / Product Type: Managed Care Medicaid /    In time: 3:35 P Out time: 4:15 P   Total Treatment Time (min): 40  Total Timed Codes (min): 30  1:1 Treatment Time ([facility] only): 30  Visit #:  6    Treatment Area: Other secondary osteoarthritis of right knee [M17.5]    SUBJECTIVE  Pain Level (0-10 scale):6-7/10  Any medication changes, allergies to medications, adverse drug reactions, diagnosis change, or new procedure performed?: [x] No    [] Yes (see summary sheet for update)  Subjective functional status/changes:     Pt reports it depends on the day if he has pain, sometimes at night sometimes not. OBJECTIVE    Modality rationale: decrease inflammation to improve the patients ability to perform ADL's. Min Type Additional Details   10 [x]  Ice     []  Heat Position:supine, juliet LE's supported    Location:cervical spine     [x] Skin assessment post-treatment:  [x]intact []redness- no adverse reaction    []redness - adverse reaction:     15 min Therapeutic Exercise:  [x] See flow sheet : updated per flowsheet   Rationale: increase strength, improve coordination, improve balance, and increase proprioception to improve the patients ability to perform ADL's    15 min Manual Therapy: Cervical distraction. MFR juliet UT, levator, SCM, cervical paraspinals. OA mobs.  Passive stretching UT juliet with OP     Rationale: decrease pain, increase tissue extensibility, and decrease trigger points to improve the patients ability to perform ADL's          With   [] TE   [] manual Patient Education: [x] Review HEP    [] Progressed/Changed HEP based on:   [] positioning   [] body mechanics   [] transfers   [] heat/ice application    [] other:      Other Objective/Functional Measures:       Pain Level (0-10 scale) post treatment: better    ASSESSMENT/Changes in Function:     Patient will continue to benefit from skilled PT services to modify and progress therapeutic interventions, address functional mobility deficits, address strength deficits, analyze and address soft tissue restrictions, analyze and cue movement patterns, and analyze and modify body mechanics/ergonomics to attain remaining goals. Progress towards goals / Updated goals: Tolerated progressed scapular strengthening without increased symptoms. Soft tissue mobility improving in cervical musculature.     PLAN  []  Upgrade activities as tolerated     []  Continue plan of care  [x]  Update interventions per flow sheet       []  Discharge due to:_  []  Other:_      Delon Canchola, PTA 1/23/2023

## 2023-01-26 ENCOUNTER — OFFICE VISIT (OUTPATIENT)
Dept: RHEUMATOLOGY | Age: 53
End: 2023-01-26
Payer: COMMERCIAL

## 2023-01-26 ENCOUNTER — HOSPITAL ENCOUNTER (OUTPATIENT)
Dept: PHYSICAL THERAPY | Age: 53
Discharge: HOME | End: 2023-01-26
Payer: COMMERCIAL

## 2023-01-26 ENCOUNTER — TELEPHONE (OUTPATIENT)
Dept: RHEUMATOLOGY | Age: 53
End: 2023-01-26

## 2023-01-26 VITALS
DIASTOLIC BLOOD PRESSURE: 64 MMHG | RESPIRATION RATE: 16 BRPM | BODY MASS INDEX: 30.71 KG/M2 | TEMPERATURE: 98.3 F | OXYGEN SATURATION: 96 % | SYSTOLIC BLOOD PRESSURE: 99 MMHG | HEART RATE: 91 BPM | WEIGHT: 202 LBS

## 2023-01-26 DIAGNOSIS — M54.2 CERVICALGIA: ICD-10-CM

## 2023-01-26 DIAGNOSIS — Z79.899 ONGOING USE OF POSSIBLY TOXIC MEDICATION: Primary | ICD-10-CM

## 2023-01-26 DIAGNOSIS — M25.561 RIGHT KNEE PAIN, UNSPECIFIED CHRONICITY: ICD-10-CM

## 2023-01-26 DIAGNOSIS — M62.838 TRAPEZIUS MUSCLE SPASM: ICD-10-CM

## 2023-01-26 DIAGNOSIS — L40.50 PSORIATIC ARTHRITIS (HCC): ICD-10-CM

## 2023-01-26 PROCEDURE — 97110 THERAPEUTIC EXERCISES: CPT

## 2023-01-26 PROCEDURE — 97140 MANUAL THERAPY 1/> REGIONS: CPT

## 2023-01-26 RX ORDER — SULFASALAZINE 500 MG/1
1000 TABLET ORAL 2 TIMES DAILY
Qty: 120 TABLET | Refills: 2 | Status: SHIPPED | OUTPATIENT
Start: 2023-01-26

## 2023-01-26 RX ORDER — METHOTREXATE 25 MG/ML
25 INJECTION, SOLUTION INTRA-ARTERIAL; INTRAMUSCULAR; INTRAVENOUS
Qty: 12 ML | Refills: 1 | Status: SHIPPED | OUTPATIENT
Start: 2023-01-27 | End: 2023-01-27 | Stop reason: SDUPTHER

## 2023-01-26 RX ORDER — CALCIUM CARB/VITAMIN D3/VIT K1 500-100-40
TABLET,CHEWABLE ORAL
Qty: 10 EACH | Refills: 5 | Status: SHIPPED | OUTPATIENT
Start: 2023-01-26

## 2023-01-26 RX ORDER — DICLOFENAC SODIUM 75 MG/1
75 TABLET, DELAYED RELEASE ORAL 2 TIMES DAILY
Qty: 60 TABLET | Refills: 2 | Status: SHIPPED | OUTPATIENT
Start: 2023-01-26

## 2023-01-26 RX ORDER — GABAPENTIN 300 MG/1
300 CAPSULE ORAL
Qty: 30 CAPSULE | Refills: 2 | Status: SHIPPED | OUTPATIENT
Start: 2023-01-26

## 2023-01-26 NOTE — PATIENT INSTRUCTIONS
1) Continue to take Tremfya injections every other month. 2) Continue to take 1mL of sub cutaneous Methotrexate once weekly with daily folic acid. 3) I am prescribing Sulfasalazine. Start by taking one pill twice daily with food. If you tolerate this well for a week increase to two pills twice daily. 4) Stop taking Otezla. I am afraid that this is worsening your mood. 5) Take Diclofenac twice daily as needed for pain. 6) Continue to take 300mg of Gabapentin at night if you tolerate it helps you sleep. 7) Check labs in one month. 8) Follow up in 6 weeks.

## 2023-01-26 NOTE — PROGRESS NOTES
REASON FOR VISIT    This is an-office Rheumatology follow-up visit for Mr. Maru Alexis for     ICD-10-CM   1. P[facility]iatic arthritis ([facility] L40.50   2. Psoriasis L40.9       Spondyloarthritis phenotype includes:  Anti-CCP positive: no  Rheumatoid factor positive: no  HLA-B27 positive: no  Inflammatory Back Pain: no  Erosive disease: yes  Sacroiliitis: no  Ankylosis: no  Psoriasis: yes  Enthesitis: no  Dactylitis: no  Nail Pitting: no  Onycholysis: no  Splinter Hemorrhage: no  Extra-articular manifestations include: none  SAPHO: no    Immunosuppression Screening (10/14/2021): Quantiferon TB: negative  PPD:  Not performed  Hepatitis B: negative  Hepatitis C: negative    Therapy History includes:  Current NSAIDs include: Celebrex 200 mg twice daily  Current DMARD include: methotrexate 25 mg SubQ every Friday (6/24/2020 to 7/2021; 10/14/2021 to present), Tremfya 100mg q2mo (9/22- ), Otezla 30mg bid (11/22- )  Prior DMARD therapy includes: sulfasalazine 1000 mg twice daily (6/27/2012 to 8/13/2014; 12/02/2014 to 500 mg twice daily;10/02/2014 to 12/02/2014), methotrexate 20 mg every Saturday (8/13/2014 to 5/27/2015; insurance coverage, 6/28/2019 to 7/28/2019; did not refill, 8/29/2019 to 6/24/2020),  Enbrel, Humira, Remicade, Otezla 30 mg twice daily (5/2015 to 6/2018), Stelara, Cosentyx 300 mg every 30 days (6/2018 to 9/2019; loss of patient assistance, 1/03/2020 to 9/16/2020),  Taltz 80 mg every 4 weeks (9/17/2020 to 7/2021; 10/2021 to 8/22, GI upset)  The following DMARDs have been ineffective: sulfasalazine, methotrexate PO, Otezla, Enbrel, Humira, Remicade, Stelara, Cosentyx   The following DMARDs were stopped because of side effects: none    HISTORY OF PRESENT ILLNESS    Mr. Maru Alexis returns for an in-office follow-up visit. Last visit 12/15/2022. Pt has been taking Tremfya injections since around September. He stopped taking Methotrexate because his prescription ran out. He says that he misses some doses.      Pt still takes [place]. He says that 2-3 weeks ago he started to feel depressed. He also started to throw up after eating. Pt started nightly Gabapentin since last visit. He says that this medication sometimes helps him to sleep. He stopped taking Celebrex since last visit. He has been taking Diclofenac prescribed by HARSHIL Mcnally instead. Pt still has joint pain in his R knee and his hands. He is wearing a knee brace with a patellar window today. He went to an orthopedic knee doctor and he was told that injections were the only option currently. He says that his PCP prescribed him Prednisone. Pt feels that his psoriasis is being controlled pretty well currently. He is more concerned with his joint pain and swelling than his skin. REVIEW OF SYSTEMS    A comprehensive review of systems was performed and pertinent results are documented in the HPI, review of systems is otherwise non-contributory. PAST MEDICAL HISTORY    He has a past medical history of Arthritis, Diabetes ([facility] (5/27/2009), Elevated liver enzymes, Hypercholesterolemia, Psoriasis (5/27/2009), and Psoriatic arthritis ([facility] (5/27/2009). FAMILY HISTORY    His family history includes Asthma in his sister; Diabetes in his father and paternal grandmother; No Known Problems in his brother and mother. SOCIAL HISTORY    He reports that he has been smoking cigarettes. He has a 10.00 pack-year smoking history. He has never used smokeless tobacco. He reports that he does not drink alcohol and does not use drugs. MEDICATIONS    Current Outpatient Medications   Medication Sig    SITagliptin phosphate (JANUVIA) 50 mg tablet Take 1 Tablet by mouth daily. ondansetron (ZOFRAN ODT) 8 mg disintegrating tablet Take 1 Tablet by mouth every eight (8) hours as needed for Nausea or Vomiting for up to 14 days.     folic acid (FOLVITE) 1 mg tablet TAKE 1 TABLET BY MOUTH EVERY DAY    diclofenac EC (VOLTAREN) 75 mg EC tablet Take 1 Tablet by mouth two (2) times a day. (Patient not taking: No sig reported)    gabapentin (NEURONTIN) 300 mg capsule Take 1 Capsule by mouth nightly. Max Daily Amount: 300 mg. (Patient not taking: No sig reported)    apremilast (Otezla) 30 mg tab Take 30 mg by mouth two (2) times a day. Indications: psoriasis associated with arthritis (Patient not taking: No sig reported)    atorvastatin (LIPITOR) 40 mg tablet Take 1 Tablet by mouth daily. empagliflozin (JARDIANCE) 25 mg tablet Take 1 Tablet by mouth daily. glimepiride (AMARYL) 4 mg tablet Take 1 Tablet by mouth every morning. lisinopriL (PRINIVIL, ZESTRIL) 2.5 mg tablet Take 1 Tablet by mouth daily. (Patient not taking: No sig reported)    dulaglutide (Trulicity) 1.5 AK/1.6 mL sub-q pen 0.5 mL by SubCUTAneous route every seven (7) days. (Patient not taking: No sig reported)    omeprazole (PRILOSEC) 40 mg capsule Take 1 Capsule by mouth Daily (before breakfast). famotidine (PEPCID) 20 mg tablet Take 1 Tablet by mouth two (2) times daily as needed for Heartburn, Gastroesophageal Reflux Disease (GERD) or Indigestion. apremilast (Otezla) 30 mg tab Take 30 mg by mouth two (2) times a day. Indications: psoriasis associated with arthritis (Patient not taking: No sig reported)    apremilast (Otezla Starter) 10 mg (4)-20 mg (4)-30 mg(19) DsPk Take 1 Package by mouth See Admin Instructions. Indications: psoriasis associated with arthritis    guselkumab 100 mg/mL atIn 100 mg by SubCUTAneous route every two (2) months. For maintenance, to begin 8 weeks after 2nd loading dose. (Patient not taking: No sig reported)    methotrexate 25 mg/mL chemo injection 1 mL by SubCUTAneous route Every Friday. clobetasoL (TEMOVATE) 0.05 % ointment Apply  to affected area two (2) times a day. Apply to right ankle (Patient not taking: No sig reported)    celecoxib (CELEBREX) 200 mg capsule Take 1 Capsule by mouth two (2) times daily as needed for Pain.  (Patient not taking: No sig reported)    ketoconazole (NIZORAL) 2 % shampoo WORK INTO SCALP AND ALLOW TO SIT FOR 5 MINUTES BEFORE RINSING THREE TIMES WEEKLY. (Patient not taking: No sig reported)    BD Insulin Syringe Ultra-Fine 1 mL 31 gauge x 5/16 syrg 1 EACH BY DOES NOT APPLY ROUTE EVERY SATURDAY FOR 12 DOSES. TO BE USED FOR METHOTREXATE SOLUTION (Patient not taking: No sig reported)    acetaminophen (TYLENOL) 500 mg tablet TAKE 1 TO 2 TABLETS BY MOUTH 3 TIMES A DAY AS NEEDED FOR PAIN     No current facility-administered medications for this visit. ALLERGIES    Allergies   Allergen Reactions    Iodinated Contrast Media Shortness of Breath       PHYSICAL EXAMINATION    There were no vitals taken for this visit. General:  The patient is well developed, well nourished, alert, and in no apparent distress. Eyes: Sclera are anicteric. No conjunctival injection. HEENT:  Oropharynx is clear. No oral ulcers. Adequate salivary pooling. No cervical or supraclavicular lymphadenopathy. Lungs:  Clear to auscultation bilaterally, without wheeze or stridor. Normal respiratory effort. Cor:  Regular rate and rhythm. No murmur rub or gallop. Abdomen: Soft, non-tender, without hepatomegaly or masses. Extremities: No calf tenderness or edema. Warm and well perfused. Skin:  No significant abnormalities. Neuro: Nonfocal  Musculoskeletal:    A comprehensive musculoskeletal exam was performed for all joints of each upper and lower extremity and assessed for swelling, tenderness and range of motion. Results are documented as below:  No evidence of synovitis in the small joints of the hands, wrists, shoulders, elbows, hips, knees or ankles. ___  General:  The patient is well developed, well nourished, alert, and in no apparent distress. Eyes: Sclera are anicteric. No conjunctival injection. HEENT:  Oropharynx is clear. No oral ulcers. Adequate salivary pooling. No cervical or supraclavicular lymphadenopathy.    Lungs:  Clear to auscultation bilaterally, without wheeze or stridor. Normal respiratory effort. Cor:  Regular rate and rhythm. No murmurs rubs or gallops. Abdomen: Soft, non-tender, without hepatomegaly or masses. Extremities: No calf tenderness or edema. Warm and well perfused. Skin:  slightly thinned psoriaform plaque right outer ankle. No current nail abnormalities. Neuro: Nonfocal, no foot or wrist drop  Musculoskeletal:    A comprehensive musculoskeletal exam was performed for all joints of each upper and lower extremity and assessed for swelling, tenderness and range of motion. Results are documented as below:  basilar right neck muscular tenderness  right elbow limited from full extension by 30deg  trace right PIP2-3 synovitis, left PIP4 synovitis and tenderness   No evidence of synovitis in the small joints of the hands, wrists, shoulders, elbows, hips, knees or ankles. DATA REVIEW    Laboratory   12/12/22: .2, Microalbumin/Cr <5, GGT 15, ESR 9, Cr 0.84, Tbili 0.5, AST 15, ALT 23, Alk phos 145, CBC WNL, CRP 23 mg/L  8/13/22: Urine glucose >1,000, CRP <0.29 mg/dL, ESR 3, Lipase 222, Cr 0.87, AST 14, Alk phos 122, ALT 36, Tbili 0.5, CBC WNL  3/9/22: ESR 2, Cr 0.92, LFT WNL, CBC WNL, CRP <1 mg/L  10/14/22: Hep Be Ab neg, Hep C virus Ab <0.1, Hep B core Ab IgM neg, Hep B Core Ab total neg, Hep B surface AB non reactive, Hep B surface Ag screen neg, QuantiFERON plus neg  Recent laboratory results were reviewed, summarized, and discussed with the patient. Imaging    Musculoskeletal Ultrasound    None    Radiographs        XR SHOULDER RT AP/LAT MIN 2 V (5/2/22): No acute abnormality. Bilateral Hand 10/12/2021: LEFT: no fracture, dislocation or other acute osseous or articular abnormality. The soft tissues are within normal limits.  Joint space narrowing and erosive changes are noted predominantly in the third and fourth PIP joints, progressive compared to the prior exam. RIGHT: no fracture or other acute osseous or articular abnormality. The soft tissues are within normal limits. Mild joint space narrowing is now noted in the DIP and PIP joints of the second through fourth digits. Bilateral Hand 5/29/2019: LEFT: No fracture or dislocation on plain film. No joint space narrowing. No joint space erosion or periosteal reaction. Alignment is within normal limits. Bone mineralization is decreased. No soft tissue calcification. RIGHT: No fracture or dislocation on plain film. No joint space narrowing. There is no joint space erosion. There is subtle periostitis distal phalanx of the middle finger. Alignment is within normal limits. Bone mineralization is decreased. No soft tissue calcification. Chest 3/30/2019: Cardiac monitoring wires overlie the thorax. The cardiomediastinal and hilar contours are within normal limits. The pulmonary vasculature is within normal limits. The lungs and pleural spaces are clear. The visualized bones and upper abdomen are age-appropriate. Right Elbow 12/15/2014: a moderate right elbow effusion. A nondisplaced fracture of the radial head is suspected. No dislocation is shown. CT Imaging    CT ABD PELV WO CONT (8/13/22): No acute intraperitoneal process is identified. Chronic basilar pulmonary nodule on the right. Incidental and/or nonemergent findings are as described in detail above. CT Head without contrast 9/10/2010: normal ventricles and basal cisterns. No intracranial masses or hemorrhages are seen. No focal intraparenchymal low density abnormalities are seen. MR Imaging    MRI Right Elbow without contrast 12/22/2014: there is a large complex appearing elbow joint effusion. There is  diffuse chondral thinning. Mild diffuse osseous edema is shown. The elbow collateral ligaments are intact. No tendon derangement is demonstrated. No soft tissue mass is shown. DXA     None    PROCEDURE     Right Elbow Kenalog 40 mg IA. . (12/17/2020)     ASSESSMENT AND PLAN    This is a follow-up visit for Mr. Rm Wu for psoriatic arthritis with persistent right elbow and hand arthralgias, and stable right ankle plaque, with continued pain but significant underlying osteoarthritis early into the addition of Otezla to [place]. Referring to PT and trialing addition of gabapentin. 1. Cervicalgia  - gabapentin (NEURONTIN) 300 mg capsule; Take 1 Capsule by mouth nightly. Max Daily Amount: 300 mg. Dispense: 30 Capsule; Refill: 2  - REFERRAL TO PHYSICAL THERAPY    2. Trapezius muscle spasm  - gabapentin (NEURONTIN) 300 mg capsule; Take 1 Capsule by mouth nightly. Max Daily Amount: 300 mg. Dispense: 30 Capsule; Refill: 2  - REFERRAL TO PHYSICAL THERAPY    3. Other secondary osteoarthritis of right knee  - REFERRAL TO ORTHOPEDICS    4. Psoriatic arthritis  - Cont Shari Zhang      There are no Patient Instructions on file for this visit. TODAY'S ORDERS    No orders of the defined types were placed in this encounter.       Future Appointments   Date Time Provider Gale Brewer   1/26/2023  9:00 AM Christal Simeon MD [facility] AM[facility]   1/26/2023  3:30 PM Bg RATLIFF [facility]   5/10/2023  3:00 PM Renaldo Moreno NP PA[facility] AMB     Face to face time: minutes  Note preparation and records review day of service: minutes  Total provider time day of service: minutes TODAY'S ORDERS    Orders Placed This Encounter    C REACTIVE PROTEIN, QT    CBC WITH AUTOMATED DIFF    METABOLIC PANEL, COMPREHENSIVE    SED RATE (ESR)    DISCONTD: methotrexate 25 mg/mL chemo injection    gabapentin (NEURONTIN) 300 mg capsule    diclofenac EC (VOLTAREN) 75 mg EC tablet    sulfaSALAzine (AZULFIDINE) 500 mg tablet    Insulin Syringe-Needle U-100 (BD Insulin Syringe Ultra-Fine) 1 mL 31 gauge x 5/16 syrg         Future Appointments   Date Time Provider Gale Brewer   1/26/2023  9:00 AM Beto Pettit MD [facility] AM[facility]   1/26/2023  3:30 PM [facility]   5/10/2023  3:00 PM Myra Diallo, NP PA[facility] BS AMB     Face to face time: 15 minutes  Note preparation and records review day of service: 20 minutes  Total provider time day of service: 35 minutes    This was scribed by Hiren Funk in the presence of Dr. Bry Godfrey. The note was reviewed and amended personally, and I agree with the above information.     Elizabeth Vaughn MD    Adult Rheumatology   [facility]  A Part of [facility], [address] [place] [address] Road   Phone [phone]  Fax [phone]

## 2023-01-26 NOTE — PROGRESS NOTES
PT DAILY TREATMENT NOTE - [facility] 2-15    Patient Name: Jeremías Robles  Date:2023  : 1970  [x]  Patient  Verified  Payor: Tay 22 / Plan: [address] / Product Type: Managed Care Medicaid /    In time: 3:30 P Out time: 4:05 P   Total Treatment Time (min): 35  Total Timed Codes (min): 35  1:1 Treatment Time ([facility] only): 30  Visit #:  7    Treatment Area: Neck pain [M54.2]  Pain in both shoulders [M25.511, M25.512]    SUBJECTIVE  Pain Level (0-10 scale): 6-7/10  Any medication changes, allergies to medications, adverse drug reactions, diagnosis change, or new procedure performed?: [x] No    [] Yes (see summary sheet for update)  Subjective functional status/changes:     Pt reports it depends on the day if he has pain, sometimes at night sometimes not. OBJECTIVE    Modality rationale: decrease inflammation to improve the patients ability to perform ADL's. Min Type Additional Details   decl [x]  Ice     []  Heat Position:supine, juliet LE's supported    Location:cervical spine     [x] Skin assessment post-treatment:  [x]intact []redness- no adverse reaction    []redness - adverse reaction:     15 min Therapeutic Exercise:  [x] See flow sheet : updated per flowsheet   Rationale: increase strength, improve coordination, improve balance, and increase proprioception to improve the patients ability to perform ADL's    15 min Manual Therapy: Cervical distraction. MFR juliet UT, levator, SCM, cervical paraspinals. OA mobs.  Passive stretching UT juliet with OP     Rationale: decrease pain, increase tissue extensibility, and decrease trigger points to improve the patients ability to perform ADL's          With   [] TE   [] manual Patient Education: [x] Review HEP    [] Progressed/Changed HEP based on:   [] positioning   [] body mechanics   [] transfers   [] heat/ice application    [] other:      Other Objective/Functional Measures:       Pain Level (0-10 scale) post treatment: better    ASSESSMENT/Changes in Function:     Patient will continue to benefit from skilled PT services to modify and progress therapeutic interventions, address functional mobility deficits, address strength deficits, analyze and address soft tissue restrictions, analyze and cue movement patterns, and analyze and modify body mechanics/ergonomics to attain remaining goals. Progress towards goals / Updated goals:  Patient with improved exercise tolerance and soft tissue mobility in cervical musculature. Patient continues with symptoms and would like to continue PT.     PLAN  []  Upgrade activities as tolerated     []  Continue plan of care  [x]  Update interventions per flow sheet       []  Discharge due to:_  [x]  Other: continue PT 2 more visits       Abby Hickey PTA 1/26/2023

## 2023-01-26 NOTE — PROGRESS NOTES
Chief Complaint   Patient presents with    Joint Pain       1. Have you been to the ER, urgent care clinic since your last visit? Hospitalized since your last visit? No    2. Have you seen or consulted any other health care providers outside of the [address] [place] [address] since your last visit? Include any pap smears or colon screening. No    Patient having migraines, sounds bothering him, vomiting about 3/4 weeks ago, sensitivity to certain smells of food. Says he has been eating ok though. Although weight loss since November from 218. Saw PCP a few days ago and was given prednisone. Hands are feeling a bit better here and there, but arms feeling pain. Feels depressed and tired. Cut back on smoking regularly, but admits to smoking during long distances.

## 2023-01-27 RX ORDER — METHOTREXATE 25 MG/ML
25 INJECTION, SOLUTION INTRA-ARTERIAL; INTRAMUSCULAR; INTRAVENOUS
Qty: 12 ML | Refills: 1 | Status: SHIPPED | OUTPATIENT
Start: 2023-01-27

## 2023-01-31 ENCOUNTER — HOSPITAL ENCOUNTER (OUTPATIENT)
Dept: PHYSICAL THERAPY | Age: 53
Discharge: HOME | End: 2023-01-31
Payer: COMMERCIAL

## 2023-01-31 PROCEDURE — 97110 THERAPEUTIC EXERCISES: CPT | Performed by: PHYSICAL THERAPIST

## 2023-01-31 PROCEDURE — 97140 MANUAL THERAPY 1/> REGIONS: CPT | Performed by: PHYSICAL THERAPIST

## 2023-01-31 NOTE — PROGRESS NOTES
PT DAILY TREATMENT NOTE - [facility] 2-15    Patient Name: Shamir Frye  LOUL:[id number]  : 1970  [x]  Patient  Verified  Payor: Tay 22 / Plan: [address] / Product Type: Managed Care Medicaid /    In time: 3294 A  Out time: 1361 P  Total Treatment Time (min): 50  Total Timed Codes (min): 40  1:1 Treatment Time ([facility]/[place] only): 40  Visit #:  8    Treatment Area: Neck pain [M54.2]  Pain in both shoulders [M25.511, M25.512]    SUBJECTIVE  Pain Level (0-10 scale): 7/10  Any medication changes, allergies to medications, adverse drug reactions, diagnosis change, or new procedure performed?: [x] No    [] Yes (see summary sheet for update)  Subjective functional status/changes:     Much better than when he started. OBJECTIVE    Modality rationale: decrease inflammation to improve the patients ability to perform ADL's. Min Type Additional Details   10 [x]  Ice     []  Heat Position:supine, juliet LE's supported    Location:cervical spine     [x] Skin assessment post-treatment:  [x]intact []redness- no adverse reaction    []redness - adverse reaction:     15 min Therapeutic Exercise:  [x] See flow sheet : updated per flowsheet   Rationale: increase strength, improve coordination, improve balance, and increase proprioception to improve the patients ability to perform ADL's    25 min Manual Therapy: Cervical distraction. MFR juliet UT, levator, SCM, cervical paraspinals. OA mobs.  Passive stretching UT juliet with OP     Rationale: decrease pain, increase tissue extensibility, and decrease trigger points to improve the patients ability to perform ADL's          With   [] TE   [] manual Patient Education: [x] Review HEP    [] Progressed/Changed HEP based on:   [] positioning   [] body mechanics   [] transfers   [] heat/ice application    [] other:      Other Objective/Functional Measures:       Pain Level (0-10 scale) post treatment: 5    ASSESSMENT/Changes in Function:     Patient will continue to benefit from skilled PT services to modify and progress therapeutic interventions, address functional mobility deficits, address strength deficits, analyze and address soft tissue restrictions, analyze and cue movement patterns, and analyze and modify body mechanics/ergonomics to attain remaining goals. Progress towards goals / Updated goals:      PLAN  []  Upgrade activities as tolerated     []  Continue plan of care  [x]  Update interventions per flow sheet       []  Discharge due to:_  [x]  Other:re-assessment next visit. Maria A Stauffer.  Nora, PT, 1/31/2023

## 2023-02-02 ENCOUNTER — HOSPITAL ENCOUNTER (OUTPATIENT)
Dept: PHYSICAL THERAPY | Age: 53
Discharge: HOME | End: 2023-02-02
Payer: COMMERCIAL

## 2023-02-02 PROCEDURE — 97110 THERAPEUTIC EXERCISES: CPT | Performed by: PHYSICAL THERAPIST

## 2023-02-02 PROCEDURE — 97140 MANUAL THERAPY 1/> REGIONS: CPT | Performed by: PHYSICAL THERAPIST

## 2023-02-02 NOTE — PROGRESS NOTES
PT DAILY TREATMENT NOTE /PROGRESS NOTE- [facility] 2-15    Patient Name: Shamir Frye  DATS:[birth year]  : 1970  [x]  Patient  Verified  Payor: Tay 22 / Plan: [address] / Product Type: Managed Care Medicaid /    In time: 230 P  Out time: 335 P   Total Treatment Time (min): 65  Total Timed Codes (min): 55  1:1 Treatment Time ([facility]/[place] only): 30  Visit #:  9    Treatment Area: Neck pain [M54.2]  Pain in both shoulders [M25.511, M25.512]    SUBJECTIVE  Pain Level (0-10 scale): 6/10  Any medication changes, allergies to medications, adverse drug reactions, diagnosis change, or new procedure performed?: [x] No    [] Yes (see summary sheet for update)  Subjective functional status/changes:     Sleeping better, not in his hand as much. OBJECTIVE    Modality rationale: decrease inflammation to improve the patients ability to perform ADL's. Min Type Additional Details   10 [x]  Ice     []  Heat Position:supine, juliet LE's supported  Location:cervical spine     [x] Skin assessment post-treatment:  [x]intact []redness- no adverse reaction    []redness - adverse reaction:     35 min Therapeutic Exercise:  [x] See flow sheet :    Rationale: increase strength, improve coordination, improve balance, and increase proprioception to improve the patients ability to perform ADL's    20 min Manual Therapy: Cervical distraction. MFR juliet UT, levator, SCM, cervical paraspinals. OA mobs. Passive stretching UT juliet with OP  right shoulder GHJ posterior glide grade II.   PROM shoulder all planes with focus on flex and abd   Rationale: decrease pain, increase tissue extensibility, and decrease trigger points to improve the patients ability to perform ADL's          With   [] TE   [] manual Patient Education: [x] Review HEP    [] Progressed/Changed HEP based on:   [] positioning   [] body mechanics   [] transfers   [] heat/ice application    [] other:      Other Objective/Functional Measures:      AROM cervical spine (Degrees)   Flexion 40   Extension 30   R Sidebending 20 p! right   L Sidebending 20 stretch +pain   R Rotation 60 p! L Rotation 60  p! Right shoulder AROM WNL all planes except:  Flexion: 170 p! Abd: 150 p! PROM right shoulder WNL tightness end range flexion and abd     Cervical myotomes WNL     Right shoulder MMT  ER: 4+/5 p! IR: 4+ p! Flexion and abd 4+ p! Tenderness to palpation: right cervical paraspinals, UT, levator, rhomboid major/minor, infraspinatus       Joint mobility:  hypomobility GHJ post glide right shoulder. Hypomobility noted cervical spine c2-c7, pain with testing all segments    Pain Level (0-10 scale) post treatment: 5    ASSESSMENT/Changes in Function:           Progress towards goals / Updated goals:  Short Term Goals: To be accomplished in 5 treatments:  -Independent in HEP as evidenced on ability to perform at least 5 exercises from HEP using proper form without verbal cuing. MET  -Pain less than or equal to 8/10 at worst to allow patient to perform ADL's with greater ease MET  -Demostrate proper posture in order to decrease cervical pain MET  -Pt will report compliance with icing 1-2x/day in order to decrease inflammation MET  -Arm pain reduced by at least 25% to allow him to do ADL's with greater ease MET     Long Term Goals: To be accomplished in 3 months:  -AROM cervical spine pain free all planes to allow pt to drive with greater ease PROGRESSING  -MMT greater than or equal to 4+/5 all planes to allow patient to perPROGRESSINGform ADL's-still painful with testing  -Pt will report his sleep is improved by at least 25%PROGRESSING    Pt with improved AROM, improved posture, improved sleeping tolerance. Will benefit from continued therapy to allow pt to reach all LTG.     PLAN  []  Upgrade activities as tolerated     []  Continue plan of care  [x]  Update interventions per flow sheet       []  Discharge due to:_  [x]  Other:continue 1-2 times weekly Sara Rosado.  Nora, PT, 2/2/2023

## 2023-02-07 ENCOUNTER — HOSPITAL ENCOUNTER (OUTPATIENT)
Dept: PHYSICAL THERAPY | Age: 53
Discharge: HOME | End: 2023-02-07
Payer: COMMERCIAL

## 2023-02-07 PROCEDURE — 97140 MANUAL THERAPY 1/> REGIONS: CPT | Performed by: PHYSICAL THERAPIST

## 2023-02-07 PROCEDURE — 97110 THERAPEUTIC EXERCISES: CPT | Performed by: PHYSICAL THERAPIST

## 2023-02-07 NOTE — PROGRESS NOTES
PT DAILY TREATMENT NOTE - [facility] 2-15    Patient Name: Raman Fischer  KDKW:[id number]  : 1970  [x]  Patient  Verified  Payor: Tay 22 / Plan: [address] / Product Type: Managed Care Medicaid /    In time: 235 P Out time: 325 P  Total Treatment Time (min): 55  Total Timed Codes (min): 45  Visit #:  10    Treatment Area: Neck pain [M54.2]  Pain in both shoulders [M25.511, M25.512]    SUBJECTIVE  Pain Level (0-10 scale): 610  Any medication changes, allergies to medications, adverse drug reactions, diagnosis change, or new procedure performed?: [x] No    [] Yes (see summary sheet for update)  Subjective functional status/changes: \"Feeling so much better, thanks to you. \"    OBJECTIVE    Modality rationale: decrease inflammation to improve the patients ability to perform ADL's. Min Type Additional Details   10 [x]  Ice     []  Heat Position:supine, juliet LE's supported    Location:cervical spine     [x] Skin assessment post-treatment:  [x]intact []redness- no adverse reaction    []redness - adverse reaction:     20 min Therapeutic Exercise:  [x] See flow sheet : updated per flowsheet   Rationale: increase strength, improve coordination, improve balance, and increase proprioception to improve the patients ability to perform ADL's    25 min Manual Therapy: Cervical distraction. MFR juliet UT, levator, SCM, cervical paraspinals. OA mobs. Passive stretching UT juliet with OP. OA mobs.      Rationale: decrease pain, increase tissue extensibility, and decrease trigger points to improve the patients ability to perform ADL's          With   [] TE   [] manual Patient Education: [x] Review HEP    [] Progressed/Changed HEP based on:   [] positioning   [] body mechanics   [] transfers   [] heat/ice application    [] other:      Other Objective/Functional Measures:       Pain Level (0-10 scale) post treatment: 5    ASSESSMENT/Changes in Function:     Patient will continue to benefit from skilled PT services to modify and progress therapeutic interventions, address functional mobility deficits, address strength deficits, analyze and address soft tissue restrictions, analyze and cue movement patterns, and analyze and modify body mechanics/ergonomics to attain remaining goals. Progress towards goals / Updated goals:    Improved soft tissue mobility noted UT, levator, cervical paraspinals. PLAN  []  Upgrade activities as tolerated     []  Continue plan of care  [x]  Update interventions per flow sheet       []  Discharge due to:_  []  Other    Rohan Flores.  Nora, PT, 2/7/2023

## 2023-02-09 ENCOUNTER — APPOINTMENT (OUTPATIENT)
Dept: PHYSICAL THERAPY | Age: 53
End: 2023-02-09
Payer: COMMERCIAL

## 2023-02-24 DIAGNOSIS — E11.65 UNCONTROLLED TYPE 2 DIABETES MELLITUS WITH HYPERGLYCEMIA (HCC): ICD-10-CM

## 2023-02-24 RX ORDER — DULAGLUTIDE 1.5 MG/.5ML
1.5 INJECTION, SOLUTION SUBCUTANEOUS
Qty: 4 EACH | Refills: 2 | Status: SHIPPED | OUTPATIENT
Start: 2023-02-24

## 2023-02-24 NOTE — TELEPHONE ENCOUNTER
Patient call for refill Trulicity. Needs new rx. (Patient is taking)  ThanksCiaran    Last Visit: 23 NP Oriana Forbes  Next Appointment: 5/10/23 NP Oriana Forbes  Previous Refill Encounter(s): 22 4 + 2    Requested Prescriptions     Pending Prescriptions Disp Refills    dulaglutide (Trulicity) 1.5 NK/6.2 mL sub-q pen 4 Each 2     Si.5 mL by SubCUTAneous route every seven (7) days. For Pharmacy Admin Tracking Only    Program: Medication Refill  CPA in place:   Recommendation Provided To:    Intervention Detail: New Rx: 1, reason: Patient Preference  Intervention Accepted By:   Lakeisha Prince Closed?:   Time Spent (min): 5

## 2023-03-03 ENCOUNTER — APPOINTMENT (OUTPATIENT)
Dept: CT IMAGING | Age: 53
End: 2023-03-03
Attending: STUDENT IN AN ORGANIZED HEALTH CARE EDUCATION/TRAINING PROGRAM
Payer: COMMERCIAL

## 2023-03-03 ENCOUNTER — HOSPITAL ENCOUNTER (EMERGENCY)
Age: 53
Discharge: HOME | End: 2023-03-03
Attending: STUDENT IN AN ORGANIZED HEALTH CARE EDUCATION/TRAINING PROGRAM
Payer: COMMERCIAL

## 2023-03-03 VITALS
SYSTOLIC BLOOD PRESSURE: 110 MMHG | OXYGEN SATURATION: 95 % | HEART RATE: 102 BPM | DIASTOLIC BLOOD PRESSURE: 72 MMHG | WEIGHT: 208.56 LBS | RESPIRATION RATE: 16 BRPM | TEMPERATURE: 97.9 F | BODY MASS INDEX: 31.71 KG/M2

## 2023-03-03 DIAGNOSIS — R51.9 RIGHT-SIDED HEADACHE: ICD-10-CM
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
## 2023-03-03 PROCEDURE — 70450 CT HEAD/BRAIN W/O DYE: CPT

## 2023-03-03 PROCEDURE — 99284 EMERGENCY DEPT VISIT MOD MDM: CPT

## 2023-03-03 PROCEDURE — 74011250637 HC RX REV CODE- 250/637: Performed by: STUDENT IN AN ORGANIZED HEALTH CARE EDUCATION/TRAINING PROGRAM

## 2023-03-03 RX ORDER — ACETAMINOPHEN 500 MG
1000 TABLET ORAL
Status: COMPLETED | OUTPATIENT
Start: 2023-03-03 | End: 2023-03-03

## 2023-03-03 RX ADMIN — ACETAMINOPHEN 1000 MG: 500 TABLET ORAL at 21:05
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
## 2023-03-04 NOTE — ED NOTES
Pt discharged in stable condition at this time. MD/AUSTIN reviewed discharge instructions, prescriptions, and follow up with patient at bedside. Pt verbalized understanding and denies any needs or questions at this time.    Pt NAD on DC ambulatory with his family

## 2023-04-25 ENCOUNTER — HOSPITAL ENCOUNTER (OUTPATIENT)
Dept: PHYSICAL THERAPY | Age: 53
Discharge: HOME | End: 2023-04-25
Payer: COMMERCIAL

## 2023-04-25 PROCEDURE — 97016 VASOPNEUMATIC DEVICE THERAPY: CPT | Performed by: PHYSICAL THERAPIST

## 2023-04-25 PROCEDURE — 97110 THERAPEUTIC EXERCISES: CPT | Performed by: PHYSICAL THERAPIST

## 2023-04-25 PROCEDURE — 97140 MANUAL THERAPY 1/> REGIONS: CPT | Performed by: PHYSICAL THERAPIST

## 2023-04-25 NOTE — PROGRESS NOTES
PT DAILY TREATMENT NOTE - [facility] 2-15    Patient Name: Moisés Coleman  Date:2023  : 1970  [x]  Patient  Verified  Payor: Tay 22 / Plan: [address] / Product Type: Managed Care Medicaid /    In time:410 P  Out time:455 P  Total Treatment Time (min): 45  Total Timed Codes (min): 35  Visit #:  2    Treatment Area: Right knee pain [M25.561]    SUBJECTIVE  Pain Level (0-10 scale): 3-10 \"it can be 3 and then go up to a 10 at any time\"  Any medication changes, allergies to medications, adverse drug reactions, diagnosis change, or new procedure performed?: [x] No    [] Yes (see summary sheet for update)  Subjective functional status/changes:     Doing okay today. Ice has been working better than heat. OBJECTIVE    Modality rationale: decrease inflammation to improve the patients ability to perform ADL's. Min Type Additional Details   10 [x]  Ice     []  Heat  + vasopneumatic compression to provide intermittent compression, thus reducing edema   Position: supine, juliet LE's supported    Location:right knee     [x] Skin assessment post-treatment:  [x]intact []redness- no adverse reaction    []redness - adverse reaction:     20 min Therapeutic Exercise:  [x] See flow sheet :   Rationale: increase strength, improve coordination, improve balance, and increase proprioception to improve the patients ability to perform ADL's    15 min Manual Therapy: PROM knee ext with towel roll under knee. Patellar mobs sup/inf in ext and in flexion.   STM quads   Rationale: decrease pain, increase tissue extensibility, and decrease trigger points to improve the patients ability to perform ADL's          With   [] TE   [] manual Patient Education: [x] Review HEP    [] Progressed/Changed HEP based on:   [] positioning   [] body mechanics   [] transfers   [] heat/ice application    [] other:      Other Objective/Functional Measures:       Pain Level (0-10 scale) post treatment: 3    ASSESSMENT/Changes in Function:     Patient will continue to benefit from skilled PT services to modify and progress therapeutic interventions, address functional mobility deficits, address strength deficits, analyze and address soft tissue restrictions, analyze and cue movement patterns, and analyze and modify body mechanics/ergonomics to attain remaining goals. Progress towards goals / Updated goals:  Pt with continued sig stiffness with PROM knee extension. Thaddeus addition of ex's well without inc pain    PLAN  []  Upgrade activities as tolerated     []  Continue plan of care  []  Update interventions per flow sheet       []  Discharge due to:_  [x]  Other:_  add STM to [address].  Nora, PT 4/25/2023

## 2023-04-27 ENCOUNTER — HOSPITAL ENCOUNTER (OUTPATIENT)
Dept: PHYSICAL THERAPY | Age: 53
Discharge: HOME | End: 2023-04-27
Payer: COMMERCIAL

## 2023-04-27 PROCEDURE — 97016 VASOPNEUMATIC DEVICE THERAPY: CPT | Performed by: PHYSICAL THERAPIST

## 2023-04-27 PROCEDURE — 97110 THERAPEUTIC EXERCISES: CPT | Performed by: PHYSICAL THERAPIST

## 2023-04-27 PROCEDURE — 97140 MANUAL THERAPY 1/> REGIONS: CPT | Performed by: PHYSICAL THERAPIST

## 2023-05-01 ENCOUNTER — APPOINTMENT (OUTPATIENT)
Facility: HOSPITAL | Age: 53
End: 2023-05-01
Payer: COMMERCIAL

## 2023-05-04 ENCOUNTER — HOSPITAL ENCOUNTER (OUTPATIENT)
Dept: PHYSICAL THERAPY | Age: 53
Discharge: HOME | End: 2023-05-04
Payer: COMMERCIAL

## 2023-05-04 PROCEDURE — 97140 MANUAL THERAPY 1/> REGIONS: CPT

## 2023-05-04 PROCEDURE — 97110 THERAPEUTIC EXERCISES: CPT

## 2023-05-04 PROCEDURE — 9990 CHARGE CONVERSION

## 2023-05-04 PROCEDURE — 97140 MANUAL THERAPY 1/> REGIONS: CPT | Performed by: PHYSICAL THERAPIST

## 2023-05-04 PROCEDURE — 97110 THERAPEUTIC EXERCISES: CPT | Performed by: PHYSICAL THERAPIST

## 2023-05-09 ENCOUNTER — APPOINTMENT (OUTPATIENT)
Dept: PHYSICAL THERAPY | Age: 53
End: 2023-05-09
Payer: COMMERCIAL

## 2023-05-09 ENCOUNTER — HOSPITAL ENCOUNTER (OUTPATIENT)
Facility: HOSPITAL | Age: 53
Setting detail: RECURRING SERIES
Discharge: HOME | End: 2023-05-12
Payer: COMMERCIAL

## 2023-05-09 PROCEDURE — 97140 MANUAL THERAPY 1/> REGIONS: CPT

## 2023-05-09 PROCEDURE — 97016 VASOPNEUMATIC DEVICE THERAPY: CPT

## 2023-05-09 PROCEDURE — 97110 THERAPEUTIC EXERCISES: CPT

## 2023-05-09 NOTE — PROGRESS NOTES
PHYSICAL THERAPY - DAILY TREATMENT NOTE (updated 3/23)      Date: 2023          Patient Name:  Ruy Stoddard :  1970   Medical   Diagnosis:  Pain in right knee [M25.561] Treatment Diagnosis:  M25.561  RIGHT KNEE PAIN    Referral Source:  ZORAN Pardo - * Insurance:   Payor: [address] [place] [address] Road / Plan: [address] [place] [address] Road / Product Type: *No Product type* /                     Patient  verified yes     Visit #   Current  / Total 5 24   Time   In / Out 415 P 510 P   Total Treatment Time 55   Total Timed Codes 45         SUBJECTIVE    Pain Level (0-10 scale): 6    Any medication changes, allergies to medications, adverse drug reactions, diagnosis change, or new procedure performed?: [x] No    [] Yes (see summary sheet for update)  Medications: Verified on Patient Summary List    Subjective functional status/changes:     Pt has most of pain at night    OBJECTIVE      Therapeutic Procedures: Tx Min Billable or 1:1 Min (if diff from Tx Min) Procedure, Rationale, Specifics   30  86181 Therapeutic Exercise (timed):  increase ROM, strength, coordination, balance, and proprioception to improve patient's ability to progress to PLOF and address remaining functional goals. (see flow sheet as applicable)     Details if applicable:     15  18170 Manual Therapy (timed):  decrease pain and increase ROM to improve patient's ability to progress to PLOF and address remaining functional goals. The manual therapy interventions were performed at a separate and distinct time from the therapeutic activities interventions . (see flow sheet as applicable)     Details if applicable:  Tibial ant glide. STM quads. Tibial distraction in sitting. 45     Total Total         Modalities Rationale:     decrease pain to improve patient's ability to progress to PLOF and address remaining functional goals.     10 min  unbilled [x]  Ice     []  Heat            location/position:supine, donna LE's

## 2023-05-11 ENCOUNTER — APPOINTMENT (OUTPATIENT)
Dept: PHYSICAL THERAPY | Age: 53
End: 2023-05-11
Payer: COMMERCIAL

## 2023-05-11 ENCOUNTER — HOSPITAL ENCOUNTER (OUTPATIENT)
Facility: HOSPITAL | Age: 53
Setting detail: RECURRING SERIES
Discharge: HOME | End: 2023-05-14
Payer: COMMERCIAL

## 2023-05-11 PROCEDURE — 97016 VASOPNEUMATIC DEVICE THERAPY: CPT

## 2023-05-11 PROCEDURE — 97110 THERAPEUTIC EXERCISES: CPT

## 2023-05-11 PROCEDURE — 97140 MANUAL THERAPY 1/> REGIONS: CPT

## 2023-05-16 ENCOUNTER — APPOINTMENT (OUTPATIENT)
Dept: PHYSICAL THERAPY | Age: 53
End: 2023-05-16
Payer: COMMERCIAL

## 2023-05-31 ENCOUNTER — OFFICE VISIT (OUTPATIENT)
Age: 53
End: 2023-05-31
Payer: COMMERCIAL

## 2023-05-31 VITALS
SYSTOLIC BLOOD PRESSURE: 113 MMHG | OXYGEN SATURATION: 97 % | DIASTOLIC BLOOD PRESSURE: 78 MMHG | BODY MASS INDEX: 30.25 KG/M2 | WEIGHT: 199.6 LBS | TEMPERATURE: 98 F | HEART RATE: 98 BPM | RESPIRATION RATE: 16 BRPM | HEIGHT: 68 IN

## 2023-05-31 DIAGNOSIS — E78.2 MIXED HYPERLIPIDEMIA: ICD-10-CM

## 2023-05-31 DIAGNOSIS — E11.65 UNCONTROLLED TYPE 2 DIABETES MELLITUS WITH HYPERGLYCEMIA (HCC): Primary | ICD-10-CM

## 2023-05-31 DIAGNOSIS — L40.50 PSORIATIC ARTHRITIS (HCC): ICD-10-CM

## 2023-05-31 LAB — HBA1C MFR BLD: 6.2 %

## 2023-05-31 PROCEDURE — 83036 HEMOGLOBIN GLYCOSYLATED A1C: CPT | Performed by: NURSE PRACTITIONER

## 2023-05-31 PROCEDURE — 99214 OFFICE O/P EST MOD 30 MIN: CPT | Performed by: NURSE PRACTITIONER

## 2023-05-31 SDOH — ECONOMIC STABILITY: INCOME INSECURITY: HOW HARD IS IT FOR YOU TO PAY FOR THE VERY BASICS LIKE FOOD, HOUSING, MEDICAL CARE, AND HEATING?: NOT HARD AT ALL

## 2023-05-31 SDOH — ECONOMIC STABILITY: FOOD INSECURITY: WITHIN THE PAST 12 MONTHS, YOU WORRIED THAT YOUR FOOD WOULD RUN OUT BEFORE YOU GOT MONEY TO BUY MORE.: NEVER TRUE

## 2023-05-31 SDOH — ECONOMIC STABILITY: FOOD INSECURITY: WITHIN THE PAST 12 MONTHS, THE FOOD YOU BOUGHT JUST DIDN'T LAST AND YOU DIDN'T HAVE MONEY TO GET MORE.: NEVER TRUE

## 2023-05-31 SDOH — ECONOMIC STABILITY: HOUSING INSECURITY
IN THE LAST 12 MONTHS, WAS THERE A TIME WHEN YOU DID NOT HAVE A STEADY PLACE TO SLEEP OR SLEPT IN A SHELTER (INCLUDING NOW)?: NO

## 2023-05-31 NOTE — PROGRESS NOTES
Chief Complaint   Patient presents with    Diabetes         1. \"Have you been to the ER, urgent care clinic since your last visit? Hospitalized since your last visit? \" Yes 3/3/23 [facility] for [facility]    2. \"Have you seen or consulted any other health care providers outside of the [address] [place] [address] since your last visit? \" Yes u dermatology     3. For patients over 39: Has the patient had a colorectal cancer screening? Yes     If the patient is female:    4. For patients over 40: Has the patient had a mammogram? N/A    5. For patients over 21: Has the patient had a pap smear?  N/A     No data recorded    Health Maintenance Due   Topic Date Due    HIV screen  Never done    Hepatitis B vaccine (1 of 3 - Risk 3-dose series) Never done    DTaP/Tdap/Td vaccine (1 - Tdap) Never done    Diabetic retinal exam  04/17/2013    COVID-19 Vaccine (3 - Booster for Pfizer series) 06/28/2021    Shingles vaccine (1 of 2) Never done    Diabetic foot exam  03/16/2023

## 2023-05-31 NOTE — PROGRESS NOTES
[facility] Note     Laura Wilcox (: 1970) is a 47 y.o. male, established patient, here for evaluation of the following chief complaint(s):  Diabetes       ASSESSMENT/PLAN:  1. Uncontrolled type 2 diabetes mellitus with hyperglycemia ([facility] [address]  - Now controlled, no dose adjustment today    Hemoglobin A1C, POC   Date Value Ref Range Status   2023 6.2 % Final        - AMB POC HEMOGLOBIN A1C  -     Comprehensive Metabolic Panel; Future  -      DIABETES FOOT EXAM    2. Psoriatic arthritis ([facility] [address]  -     Amb External Referral To Rheumatology    3. Mixed hyperlipidemia   -Continue atorvastatin 40 mg daily. We will plan to update lipid panel at next follow-up  -Diet and lifestyle modification encouraged for weight loss and chronic disease prevention/ management      The 10-year ASCVD risk score (Gudelia HERNÁNDEZ, et al., 2019) is: 13.6%    Values used to calculate the score:      Age: 47 years      Sex: Male      Is Non- : No      Diabetic: Yes      Tobacco smoker: Yes      Systolic Blood Pressure: 486 mmHg      Is BP treated: No      HDL Cholesterol: 36 MG/DL      Total Cholesterol: 168 MG/DL        Return in about 5 months (around 10/31/2023). SUBJECTIVE/OBJECTIVE:    Laura Wilcox is a 47 y.o. male seen today for DM and psoriatic arthritis. Diabetes Mellitus:  Diabetic ROS - medication compliance: compliant all of the time, diabetic diet compliance: compliant most of the time, home glucose monitoring: is performed sporadically. Further diabetic ROS: no polyuria or polydipsia, no chest pain, dyspnea or TIA's, no numbness, tingling or pain in extremities. Last eye exam approximately <1 yr ago. Psoriatic arthritis  Mr. Mary Colvin has psoriatic arthritis in which she is followed by rheumatology. He was started on guselkumab injections. He does not feel that this is helpful and his pain has become worse concerns medication.   Unfortunately, his

## 2023-06-01 LAB
ALBUMIN SERPL-MCNC: 3.6 G/DL (ref 3.5–5)
ALBUMIN/GLOB SERPL: 0.8 (ref 1.1–2.2)
ALP SERPL-CCNC: 110 U/L (ref 45–117)
ALT SERPL-CCNC: 25 U/L (ref 12–78)
ANION GAP SERPL CALC-SCNC: 6 MMOL/L (ref 5–15)
AST SERPL-CCNC: 13 U/L (ref 15–37)
BILIRUB SERPL-MCNC: 0.3 MG/DL (ref 0.2–1)
BUN SERPL-MCNC: 22 MG/DL (ref 6–20)
BUN/CREAT SERPL: 21 (ref 12–20)
CALCIUM SERPL-MCNC: 9.1 MG/DL (ref 8.5–10.1)
CHLORIDE SERPL-SCNC: 106 MMOL/L (ref 97–108)
CO2 SERPL-SCNC: 29 MMOL/L (ref 21–32)
CREAT SERPL-MCNC: 1.04 MG/DL (ref 0.7–1.3)
GLOBULIN SER CALC-MCNC: 4.3 G/DL (ref 2–4)
GLUCOSE SERPL-MCNC: 130 MG/DL (ref 65–100)
POTASSIUM SERPL-SCNC: 4.3 MMOL/L (ref 3.5–5.1)
PROT SERPL-MCNC: 7.9 G/DL (ref 6.4–8.2)
SODIUM SERPL-SCNC: 141 MMOL/L (ref 136–145)

## 2023-06-06 ENCOUNTER — TELEPHONE (OUTPATIENT)
Age: 53
End: 2023-06-06

## 2023-06-06 NOTE — TELEPHONE ENCOUNTER
Patient has [facility] so he can probably only go to [facility] with his insurance. He can always call the customer service phone # on the back of the card and they can help him find an in-network provider near him. I've sent his info over to [facility] for them to contact him for scheduling.     Yoan [facility] Specialist  [facility]  P: [phone]   F: [phone]  [email or web address]

## 2023-06-06 NOTE — TELEPHONE ENCOUNTER
----- Message from Noel Dumont sent at 6/6/2023  3:28 PM EDT -----  Subject: Referral Request    Reason for referral request? rheumatology - arthritis  Provider patient wants to be referred to(if known):     Provider Phone Number(if known): Additional Information for Provider? The previous referrals for a   rheumatologist do not accept his insurance. His former rheumatologist   provided him medication and he is in need of refills.  Please call to   advise.   ---------------------------------------------------------------------------  --------------  Leanne Meraz INFO    [phone]; OK to leave message on voicemail  ---------------------------------------------------------------------------  --------------

## 2023-06-07 NOTE — TELEPHONE ENCOUNTER
Called patient. Two patient identifiers verified. Informed pt referral specialist said [facility] should take his insurance so she sent his information to them and they should be reaching out to him to schedule. Advised pt if they do not take his insurance to call the number on the back of his insurance card to see who does. Pt verbalized understanding.

## 2023-07-03 DIAGNOSIS — L40.50 PSORIATIC ARTHRITIS (HCC): Primary | ICD-10-CM

## 2023-07-03 DIAGNOSIS — L40.50 ARTHROPATHIC PSORIASIS, UNSPECIFIED (HCC): ICD-10-CM

## 2023-07-03 RX ORDER — SULFASALAZINE 500 MG/1
1000 TABLET, DELAYED RELEASE ORAL 2 TIMES DAILY
Qty: 120 TABLET | Refills: 2 | Status: SHIPPED | OUTPATIENT
Start: 2023-07-03

## 2023-07-03 NOTE — TELEPHONE ENCOUNTER
Last visit 1/26/23  Seeing new Rheumatologist @ [facility] in September.    Lab Results   Component Value Date     05/31/2023    K 4.3 05/31/2023     05/31/2023    CO2 29 05/31/2023    BUN 22 (H) 05/31/2023    CREATININE 1.04 05/31/2023    GLUCOSE 130 (H) 05/31/2023    CALCIUM 9.1 05/31/2023    PROT 7.9 05/31/2023    LABALBU 3.6 05/31/2023    BILITOT 0.3 05/31/2023    ALKPHOS 110 05/31/2023    AST 13 (L) 05/31/2023    ALT 25 05/31/2023    LABGLOM >60 05/31/2023    GFRAA >60 08/13/2022    AGRATIO 1.8 12/12/2022    GLOB 4.3 (H) 05/31/2023     Lab Results   Component Value Date    WBC 8.8 12/12/2022    HGB 16.0 12/12/2022    HCT 47.0 12/12/2022    MCV 85 12/12/2022     12/12/2022

## 2023-07-05 DIAGNOSIS — E11.65 TYPE 2 DIABETES MELLITUS WITH HYPERGLYCEMIA (HCC): ICD-10-CM

## 2023-07-05 RX ORDER — EMPAGLIFLOZIN 25 MG/1
TABLET, FILM COATED ORAL
Qty: 90 TABLET | Refills: 1 | Status: SHIPPED | OUTPATIENT
Start: 2023-07-05

## 2023-07-05 NOTE — TELEPHONE ENCOUNTER
PCP: ZORAN Burton NP    Last appt: 5/31/2023   Future Appointments   Date Time Provider [address]   11/1/2023  3:45 PM ZORAN Basilio NP PAFP BS AMB       Requested Prescriptions     Pending Prescriptions Disp Refills    JARDIANCE 25 MG tablet [Pharmacy Med Name: Reggie Toth 25 MG TABLET] 90 tablet 1     Sig: TAKE 1 TABLET BY MOUTH EVERY DAY         Prior labs and Blood pressures:  BP Readings from Last 3 Encounters:   05/31/23 113/78   01/26/23 99/64   01/19/23 119/72     Lab Results   Component Value Date/Time     05/31/2023 04:11 PM    K 4.3 05/31/2023 04:11 PM     05/31/2023 04:11 PM    CO2 29 05/31/2023 04:11 PM    BUN 22 05/31/2023 04:11 PM    GFRAA >60 08/13/2022 01:31 PM     Lab Results   Component Value Date/Time    RAM6CNUF 6.2 05/31/2023 03:27 PM     Lab Results   Component Value Date/Time    CHOL 168 12/12/2022 11:17 AM    HDL 36 12/12/2022 11:17 AM     No results found for: VITD3, VD3RIA    No results found for: TSH, TSH2, TSH3

## 2023-07-05 NOTE — TELEPHONE ENCOUNTER
Rx transmission failed. Called [facility] and left voicemail with verbal order for Jardiance 25 mg per rx entered today.

## 2023-07-06 RX ORDER — SITAGLIPTIN 50 MG/1
TABLET, FILM COATED ORAL
Qty: 90 TABLET | Refills: 1 | Status: SHIPPED | OUTPATIENT
Start: 2023-07-06

## 2023-09-05 DIAGNOSIS — E11.65 TYPE 2 DIABETES MELLITUS WITH HYPERGLYCEMIA (HCC): ICD-10-CM

## 2023-09-06 DIAGNOSIS — K21.9 GASTRO-ESOPHAGEAL REFLUX DISEASE WITHOUT ESOPHAGITIS: ICD-10-CM

## 2023-09-06 NOTE — TELEPHONE ENCOUNTER
----- Message from Christy Salomon sent at 9/6/2023  1:34 PM EDT -----  Subject: Medication Problem    Medication: Other - Diclofenac  Dosage: 75 mg   Ordering Provider: dante    Question/Problem: Patient used to get this medication from his   rheumatologist, but his rheumatologist is no longer in the area and he is   out of his medication. He wants to know if provider will fill this for   him. Please call to let him know.        Pharmacy: [id number] [place], [address] [facility]   [phone] - F [phone]    ---------------------------------------------------------------------------  --------------  Randa VARGASXJOANA  [phone]; OK to leave message on voicemail  ---------------------------------------------------------------------------  --------------    SCRIPT ANSWERS  Relationship to Patient: Self

## 2023-09-07 RX ORDER — FAMOTIDINE 20 MG/1
TABLET, FILM COATED ORAL
Qty: 60 TABLET | Refills: 1 | Status: SHIPPED | OUTPATIENT
Start: 2023-09-07

## 2023-09-07 RX ORDER — DULAGLUTIDE 1.5 MG/.5ML
INJECTION, SOLUTION SUBCUTANEOUS
Qty: 4 ADJUSTABLE DOSE PRE-FILLED PEN SYRINGE | Refills: 0 | Status: SHIPPED | OUTPATIENT
Start: 2023-09-07 | End: 2023-10-10

## 2023-09-07 RX ORDER — DICLOFENAC SODIUM 75 MG/1
75 TABLET, DELAYED RELEASE ORAL 2 TIMES DAILY
Qty: 60 TABLET | Refills: 1 | Status: SHIPPED | OUTPATIENT
Start: 2023-09-07

## 2023-09-07 NOTE — TELEPHONE ENCOUNTER
Attempted to call patient, left voicemail that MARGARET Mathur sent in refill of diclofenac but if has not yet made appt with new rheumatologist he needs to.

## 2023-09-07 NOTE — TELEPHONE ENCOUNTER
PCP: ZORAN Jackson NP    Last appt: 5/31/2023   Future Appointments   Date Time Provider [address]   11/1/2023  3:45 PM ZORAN Amaya NP PA[facility] BS AMB       Requested Prescriptions     Pending Prescriptions Disp Refills    TRULICITY 1.5 NP/2.8EY SC injection [Pharmacy Med Name: TRULICITY 1.5 ZO/3.2 ML PEN]  5     Sig: INJECT 0.5 ML BY SUBCUTANEOUS ROUTE EVERY SEVEN (7) DAYS.          Prior labs and Blood pressures:  BP Readings from Last 3 Encounters:   05/31/23 113/78   01/26/23 99/64   01/19/23 119/72     Lab Results   Component Value Date/Time     05/31/2023 04:11 PM    K 4.3 05/31/2023 04:11 PM     05/31/2023 04:11 PM    CO2 29 05/31/2023 04:11 PM    BUN 22 05/31/2023 04:11 PM    GFRAA >60 08/13/2022 01:31 PM     Lab Results   Component Value Date/Time    WBN2FVXM 6.2 05/31/2023 03:27 PM     Lab Results   Component Value Date/Time    CHOL 168 12/12/2022 11:17 AM    HDL 36 12/12/2022 11:17 AM     No results found for: VITD3, VD3RIA    No results found for: TSH, TSH2, TSH3

## 2023-09-07 NOTE — TELEPHONE ENCOUNTER
PCP: ZORAN Alicea NP    Last appt: 5/31/2023   Future Appointments   Date Time Provider [address]   11/1/2023  3:45 PM March ZORAN Eng NP PAFP BS AMB       Requested Prescriptions     Pending Prescriptions Disp Refills    famotidine (PEPCID) 20 MG tablet [Pharmacy Med Name: FAMOTIDINE 20 MG TABLET] 60 tablet 1     Sig: TAKE 1 TABLET BY MOUTH TWICE A DAY AS NEEDED FOR HEARTBURN, GERD OR INDIGESTION         Prior labs and Blood pressures:  BP Readings from Last 3 Encounters:   05/31/23 113/78   01/26/23 99/64   01/19/23 119/72     Lab Results   Component Value Date/Time     05/31/2023 04:11 PM    K 4.3 05/31/2023 04:11 PM     05/31/2023 04:11 PM    CO2 29 05/31/2023 04:11 PM    BUN 22 05/31/2023 04:11 PM    GFRAA >60 08/13/2022 01:31 PM     Lab Results   Component Value Date/Time    [id number] 6.2 05/31/2023 03:27 PM     Lab Results   Component Value Date/Time    CHOL 168 12/12/2022 11:17 AM    HDL 36 12/12/2022 11:17 AM     No results found for: VITD3, VD3RIA    No results found for: TSH, TSH2, TSH3

## 2023-09-13 ENCOUNTER — NURSE TRIAGE (OUTPATIENT)
Dept: OTHER | Facility: CLINIC | Age: 53
End: 2023-09-13

## 2023-09-13 NOTE — TELEPHONE ENCOUNTER
Location of patient: [address] [place] [address] [place] call from Daphne at [employer] with [employer]. Subjective: Caller states he is having general body aches in the joints. Pt states he is having right sided chest pain that comes and goes. Pt states feel more when pressing on the area or coughing. Denies cardiac hx. Hx of Asthma. Current Symptoms: As above    Onset: 3 weeks ago;     Associated Symptoms: NA    Pain Severity: 0/10; N/A;     Temperature: Denies    What has been tried: Unknown    LMP: NA Pregnant: NA    Recommended disposition:  Pt did not want to complete triage and became upset during triage. Pt offered  and declined. Pt transferred to [address] [place] [address]. Care advice provided, patient verbalizes understanding; denies any other questions or concerns; instructed to call back for any new or worsening symptoms. Writer provided warm transfer to [facility] at [facility] for appt    Attention Provider: Thank you for allowing me to participate in the care of your patient. The patient was connected to triage in response to information provided to the ECC/PSC. Please do not respond through this encounter as the response is not directed to a shared pool.

## 2023-09-14 ENCOUNTER — OFFICE VISIT (OUTPATIENT)
Age: 53
End: 2023-09-14
Payer: COMMERCIAL

## 2023-09-14 VITALS
DIASTOLIC BLOOD PRESSURE: 73 MMHG | WEIGHT: 202.4 LBS | BODY MASS INDEX: 30.68 KG/M2 | HEART RATE: 97 BPM | TEMPERATURE: 98.4 F | RESPIRATION RATE: 16 BRPM | OXYGEN SATURATION: 96 % | HEIGHT: 68 IN | SYSTOLIC BLOOD PRESSURE: 105 MMHG

## 2023-09-14 DIAGNOSIS — Z23 NEEDS FLU SHOT: ICD-10-CM

## 2023-09-14 DIAGNOSIS — K21.9 GASTROESOPHAGEAL REFLUX DISEASE WITHOUT ESOPHAGITIS: ICD-10-CM

## 2023-09-14 DIAGNOSIS — S29.011A MUSCLE STRAIN OF CHEST WALL, INITIAL ENCOUNTER: ICD-10-CM

## 2023-09-14 DIAGNOSIS — R14.0 BLOATING: ICD-10-CM

## 2023-09-14 DIAGNOSIS — L40.50 PSORIATIC ARTHRITIS (HCC): Primary | ICD-10-CM

## 2023-09-14 PROCEDURE — 90674 CCIIV4 VAC NO PRSV 0.5 ML IM: CPT | Performed by: NURSE PRACTITIONER

## 2023-09-14 PROCEDURE — 99214 OFFICE O/P EST MOD 30 MIN: CPT | Performed by: NURSE PRACTITIONER

## 2023-09-14 RX ORDER — LIDOCAINE 4 G/G
1 PATCH TOPICAL DAILY
Qty: 30 PATCH | Refills: 0 | Status: SHIPPED | OUTPATIENT
Start: 2023-09-14 | End: 2023-10-14

## 2023-09-14 ASSESSMENT — ENCOUNTER SYMPTOMS: ABDOMINAL DISTENTION: 1

## 2023-09-14 NOTE — PROGRESS NOTES
[facility] Note     Domi Moore (: 1970) is a 47 y.o. male, established patient, here for evaluation of the following chief complaint(s):  Chest Pain (Right side x3 weeks, comes and goes, feels more when pressing on the area or coughing)       ASSESSMENT/PLAN:  1. Psoriatic arthritis ([address])  - Not well controlled  - Will be establishing care w/ new provider at [address]    2. Gastroesophageal reflux disease without esophagitis  -     FL MODIFIED BARIUM SWALLOW W VIDEO; Future  - May alternative famotidine & omeprazole  - Avoid exacerbating foods, tobacco and alcohol     3. Muscle strain of chest wall, initial encounter  -     lidocaine 4 % external patch; Place 1 patch onto the skin daily, TransDERmal, DAILY Starting Thu 2023, Until Sat 10/14/2023, For 30 days, Disp-30 patch, R-0, Normal  - May alternate tylenol and diclofenac 75mg prn for additional pain relief  - Cr 1.04 / PXX90 / eGFR > 60 on 23    4. Bloating  -     FL MODIFIED BARIUM SWALLOW W VIDEO; Future  - ? Gastroparesis  - Recommended avoiding gas producing goods. - Last colonoscopy , consider GI referral    5. Needs flu shot  -     DC IM ADM PRQ ID SUBQ/IM NJXS EA VACCINE  -     Influenza, FLUCELVAX, (age 10 mo+), IM, PF, 0.5 mL        Return if symptoms worsen or fail to improve. SUBJECTIVE/OBJECTIVE:    Domi Moore is a 47 y.o. male seen today for chest pain. Right upper chest experiences a sensation of \"something coming apart\". \"Like rip the paper\". Happens with standing & turning/repositioning in bed. It is intermittent in nature. The pain does not radiated. No treatments tried. Describes epigastric pressure, bloating with intermittent pain which he attributes to acid. REVIEW OF SYSTEMS:    Review of Systems   Gastrointestinal:  Positive for abdominal distention (bloating / GERD). Musculoskeletal:  Positive for arthralgias (chronic joint pain d/t RA).         Right

## 2023-10-10 DIAGNOSIS — E11.65 TYPE 2 DIABETES MELLITUS WITH HYPERGLYCEMIA (HCC): ICD-10-CM

## 2023-10-10 DIAGNOSIS — E11.9 TYPE 2 DIABETES MELLITUS WITHOUT COMPLICATIONS (HCC): ICD-10-CM

## 2023-10-10 RX ORDER — GLIMEPIRIDE 4 MG/1
4 TABLET ORAL EVERY MORNING
Qty: 90 TABLET | Refills: 1 | Status: SHIPPED | OUTPATIENT
Start: 2023-10-10

## 2023-10-10 RX ORDER — DULAGLUTIDE 1.5 MG/.5ML
1.5 INJECTION, SOLUTION SUBCUTANEOUS WEEKLY
Qty: 6 ML | Refills: 1 | Status: SHIPPED | OUTPATIENT
Start: 2023-10-10 | End: 2023-11-01 | Stop reason: SDUPTHER

## 2023-10-10 NOTE — TELEPHONE ENCOUNTER
PCP: ZORAN Mancuso NP    Last appt: 9/14/2023   Future Appointments   Date Time Provider [address]   11/1/2023  3:45 PM Wilfredo Mathur APRN - NP PAFP BS AMB       Requested Prescriptions     Pending Prescriptions Disp Refills    glimepiride (AMARYL) 4 MG tablet [Pharmacy Med Name: GLIMEPIRIDE 4 MG TABLET] 90 tablet 1     Sig: TAKE 1 TABLET BY MOUTH EVERY DAY IN THE MORNING         Prior labs and Blood pressures:  BP Readings from Last 3 Encounters:   09/14/23 105/73   05/31/23 113/78   01/26/23 99/64     Lab Results   Component Value Date/Time     05/31/2023 04:11 PM    K 4.3 05/31/2023 04:11 PM     05/31/2023 04:11 PM    CO2 29 05/31/2023 04:11 PM    BUN 22 05/31/2023 04:11 PM    GFRAA >60 08/13/2022 01:31 PM     Lab Results   Component Value Date/Time    OZN0EALZ 6.2 05/31/2023 03:27 PM     Lab Results   Component Value Date/Time    CHOL 168 12/12/2022 11:17 AM    HDL 36 12/12/2022 11:17 AM     No results found for: \"VITD3\", \"VD3RIA\"    No results found for: \"TSH\", \"TSH2\", \"TSH3\"

## 2023-10-10 NOTE — TELEPHONE ENCOUNTER
PCP: ZORAN Ramirez NP    Last appt: 9/14/2023       Future Appointments   Date Time Provider [address]   11/1/2023  3:45 PM Natali Gonzalez APRN - NP PAFP BS AMB       Requested Prescriptions     Pending Prescriptions Disp Refills    TRULICITY 1.5 QZ/4.4JK SC injection [Pharmacy Med Name: Ruthie Rankin 1.5 ER/5.1 ML PEN]       Sig: INJECT 0.5 ML UNDER THE SKIN EVERY 7 DAYS       Prior labs and Blood pressures:  BP Readings from Last 3 Encounters:   09/14/23 105/73   05/31/23 113/78   01/26/23 99/64     Lab Results   Component Value Date/Time     05/31/2023 04:11 PM    K 4.3 05/31/2023 04:11 PM     05/31/2023 04:11 PM    CO2 29 05/31/2023 04:11 PM    BUN 22 05/31/2023 04:11 PM    GFRAA >60 08/13/2022 01:31 PM     Lab Results   Component Value Date/Time    AUZ4HJQQ 6.2 05/31/2023 03:27 PM     Lab Results   Component Value Date/Time    CHOL 168 12/12/2022 11:17 AM    HDL 36 12/12/2022 11:17 AM     No results found for: \"VITD3\", \"VD3RIA\"    No results found for: \"TSH\", \"TSH2\", \"TSH3\"

## 2023-11-01 ENCOUNTER — OFFICE VISIT (OUTPATIENT)
Age: 53
End: 2023-11-01
Payer: COMMERCIAL

## 2023-11-01 VITALS
DIASTOLIC BLOOD PRESSURE: 79 MMHG | WEIGHT: 209.6 LBS | BODY MASS INDEX: 31.77 KG/M2 | TEMPERATURE: 98.7 F | HEIGHT: 68 IN | OXYGEN SATURATION: 96 % | HEART RATE: 86 BPM | RESPIRATION RATE: 16 BRPM | SYSTOLIC BLOOD PRESSURE: 101 MMHG

## 2023-11-01 DIAGNOSIS — I95.2 HYPOTENSION DUE TO DRUGS: ICD-10-CM

## 2023-11-01 DIAGNOSIS — E78.1 PURE HYPERTRIGLYCERIDEMIA: ICD-10-CM

## 2023-11-01 DIAGNOSIS — M17.10 ARTHROPATHY OF KNEE: ICD-10-CM

## 2023-11-01 DIAGNOSIS — L40.50 PSORIATIC ARTHRITIS (HCC): Primary | ICD-10-CM

## 2023-11-01 DIAGNOSIS — E11.65 TYPE 2 DIABETES MELLITUS WITH HYPERGLYCEMIA, WITHOUT LONG-TERM CURRENT USE OF INSULIN (HCC): ICD-10-CM

## 2023-11-01 LAB — HBA1C MFR BLD: 6.4 %

## 2023-11-01 PROCEDURE — 99214 OFFICE O/P EST MOD 30 MIN: CPT | Performed by: NURSE PRACTITIONER

## 2023-11-01 PROCEDURE — PBSHW AMB POC HEMOGLOBIN A1C: Performed by: NURSE PRACTITIONER

## 2023-11-01 PROCEDURE — 83036 HEMOGLOBIN GLYCOSYLATED A1C: CPT | Performed by: NURSE PRACTITIONER

## 2023-11-01 RX ORDER — TOFACITINIB 11 MG/1
TABLET, FILM COATED, EXTENDED RELEASE ORAL
COMMUNITY
Start: 2023-10-17

## 2023-11-01 RX ORDER — ATORVASTATIN CALCIUM 40 MG/1
40 TABLET, FILM COATED ORAL DAILY
Qty: 90 TABLET | Refills: 1 | Status: SHIPPED | OUTPATIENT
Start: 2023-11-01

## 2023-11-01 RX ORDER — GLIMEPIRIDE 4 MG/1
4 TABLET ORAL EVERY MORNING
Qty: 90 TABLET | Refills: 1 | Status: SHIPPED | OUTPATIENT
Start: 2023-11-01

## 2023-11-01 RX ORDER — DULAGLUTIDE 1.5 MG/.5ML
1.5 INJECTION, SOLUTION SUBCUTANEOUS WEEKLY
Qty: 6 ML | Refills: 1 | Status: SHIPPED | OUTPATIENT
Start: 2023-11-01 | End: 2024-01-18

## 2023-11-01 NOTE — PROGRESS NOTES
[facility] Note     Kim Hanna (: 1970) is a 47 y.o. male, established patient, here for evaluation of the following chief complaint(s):  Diabetes and Hyperlipidemia       ASSESSMENT/PLAN:  1. Psoriatic arthritis ([address])  Comments:  Newly established / [facility] Rheumatology. Dr. Linda Rodriguez  -Reviewed Rheum notes, Now on Xeljanz, pain improving    2. Type 2 diabetes mellitus with hyperglycemia, without long-term current use of insulin ([facility])  -Controlled      - AMB POC HEMOGLOBIN A1C  -1C today = 6.4, previously 6.2 on 2023. A1c was up to 8.3 on 3/16/2022  -     glimepiride (AMARYL) 4 MG tablet; Take 1 tablet by mouth every morning, Disp-90 tablet, R-1Normal  -     dulaglutide (TRULICITY) 1.5 ZB/4.3FQ SC injection; Inject 0.5 mLs into the skin once a week for 12 doses, Disp-6 mL, R-1Normal  -     SITagliptin (JANUVIA) 50 MG tablet; Take 1 tablet by mouth daily, Disp-90 tablet, R-1Normal  -     empagliflozin (JARDIANCE) 25 MG tablet; Take 1 tablet by mouth daily, Disp-90 tablet, R-1Normal  -     atorvastatin (LIPITOR) 40 MG tablet; Take 1 tablet by mouth daily, Disp-90 tablet, R-1Normal  -Reviewed last kidney function from 2023: Creatinine 0.84/BUN 20/eGFR 106    3. Hypotension due to drugs  -Lisinopril secondary to hypotension. He is on a low-dose 2.5 however this will be held as he is having some lightheadedness with change of position that is intermittent in nature. 4. Arthropathy of knee  - Bilateral knees  -Improving with addition of Michael Merino. Followed by rheumatology at [facility]. 5. Pure hypertriglyceridemia  -     atorvastatin (LIPITOR) 40 MG tablet;  Take 1 tablet by mouth daily, Disp-90 tablet, R-1Normal  -Plan to check lipid panel at next follow-up  -Last lipid panel on 2022 T.cholesterol 168, trig 134, HDL 36, .2        Return in about 6 months (around 2024) for diabetes follow up, hypertension / blood

## 2024-02-02 ENCOUNTER — OFFICE VISIT (OUTPATIENT)
Age: 54
End: 2024-02-02
Payer: COMMERCIAL

## 2024-02-02 VITALS
BODY MASS INDEX: 34.37 KG/M2 | HEART RATE: 83 BPM | DIASTOLIC BLOOD PRESSURE: 74 MMHG | SYSTOLIC BLOOD PRESSURE: 110 MMHG | HEIGHT: 68 IN | WEIGHT: 226.8 LBS | RESPIRATION RATE: 18 BRPM | OXYGEN SATURATION: 97 % | TEMPERATURE: 96.8 F

## 2024-02-02 DIAGNOSIS — R10.13 EPIGASTRIC PAIN: Primary | ICD-10-CM

## 2024-02-02 DIAGNOSIS — E11.9 TYPE 2 DIABETES MELLITUS WITHOUT COMPLICATION, WITHOUT LONG-TERM CURRENT USE OF INSULIN (HCC): ICD-10-CM

## 2024-02-02 DIAGNOSIS — G44.229 CHRONIC TENSION-TYPE HEADACHE, NOT INTRACTABLE: ICD-10-CM

## 2024-02-02 PROCEDURE — 99215 OFFICE O/P EST HI 40 MIN: CPT | Performed by: STUDENT IN AN ORGANIZED HEALTH CARE EDUCATION/TRAINING PROGRAM

## 2024-02-02 RX ORDER — LANCETS 33 GAUGE
EACH MISCELLANEOUS
COMMUNITY
End: 2024-02-02

## 2024-02-02 RX ORDER — DULAGLUTIDE 0.75 MG/.5ML
0.75 INJECTION, SOLUTION SUBCUTANEOUS WEEKLY
Qty: 6 ML | Refills: 0 | Status: SHIPPED | OUTPATIENT
Start: 2024-02-02

## 2024-02-02 ASSESSMENT — PATIENT HEALTH QUESTIONNAIRE - PHQ9
1. LITTLE INTEREST OR PLEASURE IN DOING THINGS: 0
SUM OF ALL RESPONSES TO PHQ QUESTIONS 1-9: 0
2. FEELING DOWN, DEPRESSED OR HOPELESS: 0
SUM OF ALL RESPONSES TO PHQ QUESTIONS 1-9: 0
SUM OF ALL RESPONSES TO PHQ9 QUESTIONS 1 & 2: 0

## 2024-02-02 NOTE — PROGRESS NOTES
Chief Complaint   Patient presents with    Headache     Morning and Night also pressure around head.     Abdominal Pain     Very top of abd, it feels like it is not going down. It feels like it always stop at that spot. Always feels full.      \"Have you been to the ER, urgent care clinic since your last visit?  Hospitalized since your last visit?\"    NO    “Have you seen or consulted any other health care providers outside of [facility] since your last visit?”    NO                   2/2/2024     2:02 PM   PHQ-9    Little interest or pleasure in doing things 0   Feeling down, depressed, or hopeless 0   PHQ-2 Score 0   PHQ-9 Total Score 0           Financial Resource Strain: Low Risk  (5/31/2023)    Overall Financial Resource Strain (CARDIA)     Difficulty of Paying Living Expenses: Not hard at all      Food Insecurity: Not on file (5/31/2023)          Health Maintenance Due   Topic Date Due    Hepatitis B vaccine (1 of 3 - 3-dose series) Never done    HIV screen  Never done    DTaP/Tdap/Td vaccine (1 - Tdap) Never done    Pneumococcal 0-64 years Vaccine (2 - PCV) 11/11/2016    Shingles vaccine (1 of 2) Never done    COVID-19 Vaccine (3 - 2023-24 season) 09/01/2023    Diabetic Alb to Cr ratio (uACR) test  12/12/2023    Lipids  12/12/2023    Depression Screen  01/26/2024        
on file     Lack of Transportation (Non-Medical): No   Physical Activity: Not on file   Stress: Not on file   Social Connections: Not on file   Intimate Partner Violence: Not on file   Housing Stability: Unknown (5/31/2023)    Housing Stability Vital Sign     Unable to Pay for Housing in the Last Year: Not on file     Number of Places Lived in the Last Year: Not on file     Unstable Housing in the Last Year: No         Family History - reviewed:  Family History   Problem Relation Age of Onset    Asthma Sister     No Known Problems Brother     Diabetes Father     No Known Problems Mother     Diabetes Paternal Grandmother          Immunizations - reviewed:   Immunization History   Administered Date(s) Administered    COVID-19, PFIZER PURPLE top, DILUTE for use, (age 12 y+), 30mcg/0.3mL 04/12/2021, 05/03/2021    Influenza A (Q6i1-25),all Formulations 10/27/2009    Influenza Virus Vaccine 10/10/2012, 10/19/2012, 12/16/2021, 11/09/2022    Influenza, FLUARIX, FLULAVAL, FLUZONE (age 6 mo+) AND AFLURIA, (age 3 y+), PF, 0.5mL 12/16/2021, 11/09/2022    Influenza, FLUCELVAX, (age 6 mo+), MDCK, PF, 0.5mL 09/14/2023    Influenza, Intradermal, Preservative free 09/11/2017    Pneumococcal, PPSV23, PNEUMOVAX 23, (age 2y+), SC/IM, 0.5mL 11/11/2015         Physical Exam  Visit Vitals  /74 (Site: Right Upper Arm, Position: Sitting, Cuff Size: Large Adult)   Pulse 83   Temp 96.8 °F (36 °C) (Temporal)   Resp 18   Ht 1.727 m (5' 8\")   Wt 102.9 kg (226 lb 12.8 oz)   SpO2 97%   BMI 34.48 kg/m²        Physical Exam  Vitals and nursing note reviewed.   Constitutional:       Appearance: Normal appearance.   Abdominal:      General: Abdomen is flat. Bowel sounds are normal.      Palpations: Abdomen is soft. There is no mass.      Tenderness: There is no abdominal tenderness. There is no guarding or rebound.      Hernia: No hernia is present.   Neurological:      General: No focal deficit present.      Mental Status: He is alert and

## 2024-02-02 NOTE — PATIENT INSTRUCTIONS
Stay well hydrated throughout the day aim to drink 8 glasses of water during the day  Take 2-3 tablets of 200mg ibuprofen when you have a headache, not more than 4 times in a day

## 2024-03-25 ENCOUNTER — OFFICE VISIT (OUTPATIENT)
Age: 54
End: 2024-03-25
Payer: COMMERCIAL

## 2024-03-25 VITALS
TEMPERATURE: 97.5 F | DIASTOLIC BLOOD PRESSURE: 75 MMHG | SYSTOLIC BLOOD PRESSURE: 112 MMHG | WEIGHT: 224 LBS | HEART RATE: 90 BPM | OXYGEN SATURATION: 97 % | HEIGHT: 68 IN | RESPIRATION RATE: 14 BRPM | BODY MASS INDEX: 33.95 KG/M2

## 2024-03-25 DIAGNOSIS — E11.65 UNCONTROLLED TYPE 2 DIABETES MELLITUS WITH HYPERGLYCEMIA (HCC): ICD-10-CM

## 2024-03-25 DIAGNOSIS — J40 BRONCHITIS: Primary | ICD-10-CM

## 2024-03-25 DIAGNOSIS — R05.1 ACUTE COUGH: ICD-10-CM

## 2024-03-25 DIAGNOSIS — Z72.0 TOBACCO USE: ICD-10-CM

## 2024-03-25 LAB
LOT EXPIRE DATE: NORMAL
LOT KIT NUMBER: NORMAL
SARS-COV-2, POC: NORMAL
VALID INTERNAL CONTROL: YES
VENDOR AND KIT NAME POC: NORMAL

## 2024-03-25 PROCEDURE — 99214 OFFICE O/P EST MOD 30 MIN: CPT | Performed by: STUDENT IN AN ORGANIZED HEALTH CARE EDUCATION/TRAINING PROGRAM

## 2024-03-25 PROCEDURE — 87426 SARSCOV CORONAVIRUS AG IA: CPT | Performed by: STUDENT IN AN ORGANIZED HEALTH CARE EDUCATION/TRAINING PROGRAM

## 2024-03-25 PROCEDURE — PBSHW AMB POC SARS-COV-2: Performed by: STUDENT IN AN ORGANIZED HEALTH CARE EDUCATION/TRAINING PROGRAM

## 2024-03-25 RX ORDER — GUAIFENESIN 600 MG/1
600 TABLET, EXTENDED RELEASE ORAL DAILY PRN
Qty: 30 TABLET | Refills: 0 | Status: SHIPPED | OUTPATIENT
Start: 2024-03-25 | End: 2024-04-24

## 2024-03-25 RX ORDER — AMOXICILLIN AND CLAVULANATE POTASSIUM 875; 125 MG/1; MG/1
1 TABLET, FILM COATED ORAL 2 TIMES DAILY
Qty: 14 TABLET | Refills: 0 | Status: SHIPPED | OUTPATIENT
Start: 2024-03-25 | End: 2024-04-01

## 2024-03-25 NOTE — PROGRESS NOTES
Chief Complaint   Patient presents with    Cough     Cough and congestion for about 8 days. Feels like a deep chest congestion. Sore throat due to coughing. Has not taken anything over the counter due to diabetes, he wasn't sure what to take. Denies fever/chills/body aches.    Congestion     \"Have you been to the ER, urgent care clinic since your last visit?  Hospitalized since your last visit?\"    NO    “Have you seen or consulted any other health care providers outside of [facility] since your last visit?”    NO                   2/2/2024     2:02 PM   PHQ-9    Little interest or pleasure in doing things 0   Feeling down, depressed, or hopeless 0   PHQ-2 Score 0   PHQ-9 Total Score 0           Financial Resource Strain: Low Risk  (5/31/2023)    Overall Financial Resource Strain (CARDIA)     Difficulty of Paying Living Expenses: Not hard at all      Food Insecurity: Not on file (5/31/2023)          Health Maintenance Due   Topic Date Due    Hepatitis B vaccine (1 of 3 - 3-dose series) Never done    HIV screen  Never done    DTaP/Tdap/Td vaccine (1 - Tdap) Never done    Pneumococcal 0-64 years Vaccine (2 of 2 - PCV) 11/11/2016    Shingles vaccine (1 of 2) Never done    COVID-19 Vaccine (3 - 2023-24 season) 09/01/2023    Diabetic Alb to Cr ratio (uACR) test  12/12/2023    Lipids  12/12/2023

## 2024-03-25 NOTE — PROGRESS NOTES
[facility] PRACTICE      Chief Complaint:     Chief Complaint   Patient presents with    Cough     Cough and congestion for about 8 days. Feels like a deep chest congestion. Sore throat due to coughing. Has not taken anything over the counter due to diabetes, he wasn't sure what to take. Denies fever/chills/body aches.    Congestion       Rudi Oh is a 53 y.o. male that presents for: URI      Assessment/Plan:     COVID negative  Multiple risk fx for bacterial infection    Rudi was seen today for cough and congestion.    Diagnoses and all orders for this visit:    Bronchitis  -     amoxicillin-clavulanate (AUGMENTIN) 875-125 MG per tablet; Take 1 tablet by mouth 2 times daily for 7 days  -     guaiFENesin (MUCINEX) 600 MG extended release tablet; Take 1 tablet by mouth daily as needed for Congestion    Acute cough  -     AMB POC SARS-COV-2    Tobacco use    Uncontrolled type 2 diabetes mellitus with hyperglycemia (HCC)           Follow up:     No follow-up provider specified.     Subjective:   HPI:  Rudi Oh is a 53 y.o. male that presents for:    Hx of diabetes  Cough/congestion x 5 days  Patient is a smoker. No hx of lung disease    Denies fevers, chills, chest pain, sob, wheezing, calf pain or swelling, n/v/d, abdominal pain    Health Maintenance:  Health Maintenance Due   Topic Date Due    Hepatitis B vaccine (1 of 3 - 3-dose series) Never done    HIV screen  Never done    DTaP/Tdap/Td vaccine (1 - Tdap) Never done    Pneumococcal 0-64 years Vaccine (2 of 2 - PCV) 11/11/2016    Shingles vaccine (1 of 2) Never done    COVID-19 Vaccine (3 - 2023-24 season) 09/01/2023    Diabetic Alb to Cr ratio (uACR) test  12/12/2023    Lipids  12/12/2023        ROS:   See HPI        Allergies personally reviewed.  Allergies   Allergen Reactions    Iodinated Contrast Media Shortness Of Breath    Metformin Hcl Diarrhea, Other (See Comments), Nausea And Vomiting and Itching          Objective:   Vitals

## 2024-03-27 ENCOUNTER — TELEPHONE (OUTPATIENT)
Age: 54
End: 2024-03-27

## 2024-03-27 NOTE — TELEPHONE ENCOUNTER
Patient called stating that he was here two days ago, and was given medication. The medication that was given to the patient did not work, and is still have symptoms that are the same, if not worse. Patient is hoping to have something else called in or anything. Patient is hoping for this to be completed as soon as possible.    Best call back number  [phone]

## 2024-03-28 NOTE — TELEPHONE ENCOUNTER
Attempted to contact patient. Left voicemail requesting call back. Advised patient that he needs to complete the antibiotic, but if he feels like his symptoms are worsening, he needs to be seen at UC or follow up with his PCP.    Genny Almonte CMA

## 2024-04-24 DIAGNOSIS — E11.9 TYPE 2 DIABETES MELLITUS WITHOUT COMPLICATION, WITHOUT LONG-TERM CURRENT USE OF INSULIN (HCC): ICD-10-CM

## 2024-04-25 RX ORDER — LISINOPRIL 2.5 MG/1
2.5 TABLET ORAL DAILY
Qty: 30 TABLET | Refills: 0 | Status: SHIPPED | OUTPATIENT
Start: 2024-04-25

## 2024-04-25 RX ORDER — DULAGLUTIDE 0.75 MG/.5ML
INJECTION, SOLUTION SUBCUTANEOUS
Qty: 12 ML | Refills: 0 | Status: SHIPPED | OUTPATIENT
Start: 2024-04-25

## 2024-04-25 NOTE — TELEPHONE ENCOUNTER
NP Kareen,    Maikel Posada, patient is requesting a new prescription for lisinopril 2.5mg.  Thanks, Rebeca    Last appointment: 11/1/23 Kareen  Next appointment: 5/1/24 Kareen  Previous refill encounter(s): 11/9/22 90 + 1    Requested Prescriptions     Pending Prescriptions Disp Refills    lisinopril (PRINIVIL;ZESTRIL) 2.5 MG tablet 30 tablet 0     Sig: Take 1 tablet by mouth daily     For Pharmacy Admin Tracking Only    Program: Medication Refill  CPA in place:    Recommendation Provided To:   Intervention Detail: New Rx: 1, reason: Patient Preference  Intervention Accepted By:   Gap Closed?:    Time Spent (min): 5

## 2024-04-25 NOTE — TELEPHONE ENCOUNTER
PCP: Rosa Mathur APRN - NP    Last appt: 3/25/2024   Future Appointments   Date Time Provider Department Center   5/1/2024  2:45 PM Rosa Mathur APRN - NP [facility]       Requested Prescriptions     Pending Prescriptions Disp Refills    TRULICITY 0.75 MG/0.5ML SOPN SC injection [Pharmacy Med Name: Trulicity 0.75 MG/0.5ML Subcutaneous Solution Pen-injector] 12 mL 0     Sig: INJECT 0.75 MG SUBCUTANEOUSLY ONCE A WEEK         Prior labs and Blood pressures:  BP Readings from Last 3 Encounters:   03/25/24 112/75   02/02/24 110/74   11/01/23 101/79     Lab Results   Component Value Date/Time     05/31/2023 04:11 PM    K 4.3 05/31/2023 04:11 PM     05/31/2023 04:11 PM    CO2 29 05/31/2023 04:11 PM    BUN 22 05/31/2023 04:11 PM    GFRAA >60 08/13/2022 01:31 PM     Lab Results   Component Value Date/Time    MTW6COCC 6.4 11/01/2023 03:51 PM     Lab Results   Component Value Date/Time    CHOL 168 12/12/2022 11:17 AM    HDL 36 12/12/2022 11:17 AM     No results found for: \"VITD3\", \"VD3RIA\"    No results found for: \"TSH\", \"TSH2\", \"TSH3\"

## 2024-07-09 DIAGNOSIS — E11.9 TYPE 2 DIABETES MELLITUS WITHOUT COMPLICATION, WITHOUT LONG-TERM CURRENT USE OF INSULIN (HCC): ICD-10-CM

## 2024-07-09 DIAGNOSIS — E11.65 TYPE 2 DIABETES MELLITUS WITH HYPERGLYCEMIA, WITHOUT LONG-TERM CURRENT USE OF INSULIN (HCC): ICD-10-CM

## 2024-07-09 RX ORDER — DULAGLUTIDE 0.75 MG/.5ML
INJECTION, SOLUTION SUBCUTANEOUS
Qty: 12 ML | Refills: 0 | Status: SHIPPED | OUTPATIENT
Start: 2024-07-09

## 2024-07-09 RX ORDER — EMPAGLIFLOZIN 25 MG/1
25 TABLET, FILM COATED ORAL DAILY
Qty: 30 TABLET | Refills: 0 | Status: SHIPPED | OUTPATIENT
Start: 2024-07-09

## 2024-07-09 RX ORDER — SITAGLIPTIN 50 MG/1
50 TABLET, FILM COATED ORAL DAILY
Qty: 180 TABLET | Refills: 0 | Status: SHIPPED | OUTPATIENT
Start: 2024-07-09

## 2024-07-09 NOTE — TELEPHONE ENCOUNTER
PCP: Rosa Mathur, APRN - NP    Last seen on 11.01.2023    No future appointments.    Requested Prescriptions     Refused Prescriptions Disp Refills    JANUVIA 50 MG tablet [Pharmacy Med Name: Januvia 50 MG Oral Tablet] 180 tablet 0     Sig: Take 1 tablet by mouth once daily    JARDIANCE 25 MG tablet [Pharmacy Med Name: Jardiance 25 MG Oral Tablet] 30 tablet 0     Sig: Take 1 tablet by mouth once daily    TRULICITY 0.75 MG/0.5ML SOPN SC injection [Pharmacy Med Name: Trulicity 0.75 MG/0.5ML Subcutaneous Solution Pen-injector] 12 mL 0     Sig: INJECT 0.75 MG SUBCUTANEOUSLY ONCE A WEEK       Prior labs and Blood pressures:  BP Readings from Last 3 Encounters:   03/25/24 112/75   02/02/24 110/74   11/01/23 101/79     Lab Results   Component Value Date/Time     05/31/2023 04:11 PM    K 4.3 05/31/2023 04:11 PM     05/31/2023 04:11 PM    CO2 29 05/31/2023 04:11 PM    BUN 22 05/31/2023 04:11 PM    GFRAA >60 08/13/2022 01:31 PM     Lab Results   Component Value Date/Time    QYQ5CNLG 6.4 11/01/2023 03:51 PM     Lab Results   Component Value Date/Time    CHOL 168 12/12/2022 11:17 AM    HDL 36 12/12/2022 11:17 AM    VLDL 26.8 12/12/2022 11:17 AM     No results found for: \"VITD3\"    No results found for: \"TSH\", \"TSH2\", \"TSH3\"

## 2024-10-30 DIAGNOSIS — E11.9 TYPE 2 DIABETES MELLITUS WITHOUT COMPLICATION, WITHOUT LONG-TERM CURRENT USE OF INSULIN (HCC): ICD-10-CM

## 2024-10-31 NOTE — TELEPHONE ENCOUNTER
PCP: Rosa Mathur, APRN - NP    Last appt: 3/25/2024   No future appointments.    Requested Prescriptions     Pending Prescriptions Disp Refills    TRULICITY 0.75 MG/0.5ML SOAJ SC injection [Pharmacy Med Name: Trulicity 0.75 MG/0.5ML Subcutaneous Solution Pen-injector] 12 mL 0     Sig: INJECT 0.75 (MG) SUBCUTANEOUSLY ONCE A WEEK         Prior labs and Blood pressures:  BP Readings from Last 3 Encounters:   03/25/24 112/75   02/02/24 110/74   11/01/23 101/79     Lab Results   Component Value Date/Time     05/31/2023 04:11 PM    K 4.3 05/31/2023 04:11 PM     05/31/2023 04:11 PM    CO2 29 05/31/2023 04:11 PM    BUN 22 05/31/2023 04:11 PM    GFRAA >60 08/13/2022 01:31 PM     Lab Results   Component Value Date/Time    MMX2UEHX 6.4 11/01/2023 03:51 PM     Lab Results   Component Value Date/Time    CHOL 168 12/12/2022 11:17 AM    HDL 36 12/12/2022 11:17 AM    .2 12/12/2022 11:17 AM    VLDL 26.8 12/12/2022 11:17 AM     No results found for: \"VITD3\"    No results found for: \"TSH\", \"TSH2\", \"TSH3\"

## 2024-11-01 RX ORDER — DULAGLUTIDE 0.75 MG/.5ML
INJECTION, SOLUTION SUBCUTANEOUS
Qty: 12 ML | Refills: 0 | Status: SHIPPED | OUTPATIENT
Start: 2024-11-01

## 2024-12-12 ENCOUNTER — OFFICE VISIT (OUTPATIENT)
Age: 54
End: 2024-12-12
Payer: COMMERCIAL

## 2024-12-12 VITALS
OXYGEN SATURATION: 97 % | DIASTOLIC BLOOD PRESSURE: 64 MMHG | TEMPERATURE: 97.3 F | HEIGHT: 68 IN | BODY MASS INDEX: 30.46 KG/M2 | HEART RATE: 88 BPM | SYSTOLIC BLOOD PRESSURE: 96 MMHG | WEIGHT: 201 LBS | RESPIRATION RATE: 12 BRPM

## 2024-12-12 DIAGNOSIS — Z23 NEEDS FLU SHOT: ICD-10-CM

## 2024-12-12 DIAGNOSIS — Z79.60 LONG-TERM USE OF IMMUNOSUPPRESSANT MEDICATION: ICD-10-CM

## 2024-12-12 DIAGNOSIS — Z72.0 TOBACCO USE: ICD-10-CM

## 2024-12-12 DIAGNOSIS — Z87.891 PERSONAL HISTORY OF TOBACCO USE, PRESENTING HAZARDS TO HEALTH: ICD-10-CM

## 2024-12-12 DIAGNOSIS — E78.2 MIXED HYPERLIPIDEMIA: ICD-10-CM

## 2024-12-12 DIAGNOSIS — L40.50 PSORIATIC ARTHRITIS (HCC): ICD-10-CM

## 2024-12-12 DIAGNOSIS — Z12.5 PROSTATE CANCER SCREENING: ICD-10-CM

## 2024-12-12 DIAGNOSIS — Z11.4 ENCOUNTER FOR SCREENING FOR HIV: ICD-10-CM

## 2024-12-12 DIAGNOSIS — Z87.891 PERSONAL HISTORY OF TOBACCO USE: ICD-10-CM

## 2024-12-12 DIAGNOSIS — E11.65 UNCONTROLLED TYPE 2 DIABETES MELLITUS WITH HYPERGLYCEMIA (HCC): Primary | ICD-10-CM

## 2024-12-12 LAB — HBA1C MFR BLD: 9.6 %

## 2024-12-12 PROCEDURE — 99214 OFFICE O/P EST MOD 30 MIN: CPT | Performed by: NURSE PRACTITIONER

## 2024-12-12 PROCEDURE — 83036 HEMOGLOBIN GLYCOSYLATED A1C: CPT | Performed by: NURSE PRACTITIONER

## 2024-12-12 PROCEDURE — 90661 CCIIV3 VAC ABX FR 0.5 ML IM: CPT | Performed by: NURSE PRACTITIONER

## 2024-12-12 PROCEDURE — G0296 VISIT TO DETERM LDCT ELIG: HCPCS | Performed by: NURSE PRACTITIONER

## 2024-12-12 RX ORDER — ATORVASTATIN CALCIUM 40 MG/1
40 TABLET, FILM COATED ORAL DAILY
Qty: 90 TABLET | Refills: 1 | Status: SHIPPED | OUTPATIENT
Start: 2024-12-12 | End: 2024-12-13

## 2024-12-12 RX ORDER — TRIAMCINOLONE ACETONIDE 0.25 MG/G
OINTMENT TOPICAL
Qty: 454 G | Refills: 1 | Status: SHIPPED | OUTPATIENT
Start: 2024-12-12 | End: 2024-12-19

## 2024-12-12 SDOH — ECONOMIC STABILITY: FOOD INSECURITY: WITHIN THE PAST 12 MONTHS, THE FOOD YOU BOUGHT JUST DIDN'T LAST AND YOU DIDN'T HAVE MONEY TO GET MORE.: NEVER TRUE

## 2024-12-12 SDOH — ECONOMIC STABILITY: FOOD INSECURITY: WITHIN THE PAST 12 MONTHS, YOU WORRIED THAT YOUR FOOD WOULD RUN OUT BEFORE YOU GOT MONEY TO BUY MORE.: NEVER TRUE

## 2024-12-12 SDOH — ECONOMIC STABILITY: INCOME INSECURITY: HOW HARD IS IT FOR YOU TO PAY FOR THE VERY BASICS LIKE FOOD, HOUSING, MEDICAL CARE, AND HEATING?: NOT HARD AT ALL

## 2024-12-12 ASSESSMENT — PATIENT HEALTH QUESTIONNAIRE - PHQ9
SUM OF ALL RESPONSES TO PHQ QUESTIONS 1-9: 0
SUM OF ALL RESPONSES TO PHQ QUESTIONS 1-9: 0
2. FEELING DOWN, DEPRESSED OR HOPELESS: NOT AT ALL
1. LITTLE INTEREST OR PLEASURE IN DOING THINGS: NOT AT ALL
SUM OF ALL RESPONSES TO PHQ QUESTIONS 1-9: 0
SUM OF ALL RESPONSES TO PHQ9 QUESTIONS 1 & 2: 0
SUM OF ALL RESPONSES TO PHQ QUESTIONS 1-9: 0

## 2024-12-12 NOTE — PATIENT INSTRUCTIONS
follow-up, he or she will help you understand what to do next.  After a lung cancer screening, you can go back to your usual activities right away.  A lung cancer screening test can't tell if you have lung cancer. If your results are positive, your doctor can't tell whether an abnormal finding is a harmless nodule, cancer, or something else without doing more tests.  What can you do to help prevent lung cancer?  Some lung cancers can't be prevented. But if you smoke, quitting smoking is the best step you can take to prevent lung cancer. If you want to quit, your doctor can recommend medicines or other ways to help.  Follow-up care is a key part of your treatment and safety. Be sure to make and go to all appointments, and call your doctor if you are having problems. It's also a good idea to know your test results and keep a list of the medicines you take.  Where can you learn more?  Go to https://www.[employer].net/patientEd and enter Q940 to learn more about \"Learning About Lung Cancer Screening.\"  Current as of: October 25, 2023  Content Version: 14.2  © 2024 [employer].   Care instructions adapted under license by [employer]. If you have questions about a medical condition or this instruction, always ask your healthcare professional. [employer] disclaims any warranty or liability for your use of this information.

## 2024-12-12 NOTE — PROGRESS NOTES
[facility]  Clinic Note     Rudi Oh (: 1970) is a 54 y.o. male, established patient, here for evaluation of the following chief complaint(s):  Diabetes and Medication Refill (Needs some new refills/Medication cream is not helping )       ASSESSMENT/PLAN:    1. Uncontrolled type 2 diabetes mellitus with hyperglycemia (HCC)  Assessment & Plan:   Chronic, worsening (exacerbation), changes made today: INCREASE TRULICITY, medication adherence emphasized, and lifestyle modifications recommended.  Referred back to clinical pharmacist for additional support.  Orders:  -     AMB POC HEMOGLOBIN A1C  -     Ambulatory Referral to Primary Care Pharmacist  -     Comprehensive Metabolic Panel; Future  -     Lipid Panel; Future  -     Microalbumin / Creatinine Urine Ratio; Future  -     dulaglutide (TRULICITY) 1.5 MG/0.5ML SC injection; Inject 0.5 mLs into the skin once a week, Disp-2 mL, R-1Normal  -      DIABETES FOOT EXAM  -     atorvastatin (LIPITOR) 40 MG tablet; Take 1 tablet by mouth daily, Disp-90 tablet, R-1Normal    2. Psoriatic arthritis (HCC)  Assessment & Plan:   Chronic, not at goal (unstable), psoriasis not well-controlled.  Sees rheumatology and dermatology without improvement of plaques specifically to the right lower extremity.  -Reviewed 2024 surgical pathology from right ankle shave biopsy consistent with psoriasis.  Negative for fungus  Orders:  -     triamcinolone (KENALOG) 0.025 % ointment; Apply topically 2 times daily., Disp-454 g, R-1, Normal    3. Prostate cancer screening  -     PSA Screening; Future    4. Tobacco use  -     DE VISIT TO DISCUSS LUNG CA SCREEN W LDCT  -     CT Lung Screen (Initial/Annual/Baseline); Future  Tobacco Cessation Counseling: Patient advised about behavior change, including information about personal health harms, usage of appropriate cessation measures and benefits of cessation.  I advised patient to quit, and offered support.

## 2024-12-12 NOTE — PROGRESS NOTES
Chief Complaint   Patient presents with    Diabetes    Medication Refill     Needs some new refills  Medication cream is not helping          \"Have you been to the ER, urgent care clinic since your last visit?  Hospitalized since your last visit?\"    No    “Have you seen or consulted any other health care providers outside of [facility] since your last visit?”    Dermatologist  Rheumatology             Click Here for Release of Records Request           12/12/2024     1:51 PM   PHQ-9    Little interest or pleasure in doing things 0   Feeling down, depressed, or hopeless 0   PHQ-2 Score 0   PHQ-9 Total Score 0           Financial Resource Strain: Low Risk  (12/12/2024)    Overall Financial Resource Strain (CARDIA)     Difficulty of Paying Living Expenses: Not hard at all      Food Insecurity: No Food Insecurity (12/12/2024)    Hunger Vital Sign     Worried About Running Out of Food in the Last Year: Never true     Ran Out of Food in the Last Year: Never true          Health Maintenance Due   Topic Date Due    HIV screen  Never done    Hepatitis B vaccine (1 of 3 - 19+ 3-dose series) Never done    DTaP/Tdap/Td vaccine (1 - Tdap) Never done    Pneumococcal 0-64 years Vaccine (2 of 2 - PCV) 11/11/2016    Shingles vaccine (1 of 2) Never done    Diabetic Alb to Cr ratio (uACR) test  12/12/2023    Lipids  12/12/2023    Diabetic foot exam  05/31/2024    GFR test (Diabetes, CKD 3-4, OR last GFR 15-59)  05/31/2024    Diabetic retinal exam  06/07/2024    Flu vaccine (1) 08/01/2024    COVID-19 Vaccine (3 - 2023-24 season) 09/01/2024    A1C test (Diabetic or Prediabetic)  11/01/2024

## 2024-12-12 NOTE — ASSESSMENT & PLAN NOTE
Chronic, worsening (exacerbation), changes made today: INCREASE TRULICITY, medication adherence emphasized, and lifestyle modifications recommended.  Referred back to clinical pharmacist for additional support.

## 2024-12-12 NOTE — ASSESSMENT & PLAN NOTE
Chronic, not at goal (unstable), psoriasis not well-controlled.  Sees rheumatology and dermatology without improvement of plaques specifically to the right lower extremity.

## 2024-12-13 DIAGNOSIS — E11.65 UNCONTROLLED TYPE 2 DIABETES MELLITUS WITH HYPERGLYCEMIA (HCC): ICD-10-CM

## 2024-12-13 DIAGNOSIS — E78.2 MIXED HYPERLIPIDEMIA: ICD-10-CM

## 2024-12-13 DIAGNOSIS — R74.8 ELEVATED SERUM ALKALINE PHOSPHATASE LEVEL: Primary | ICD-10-CM

## 2024-12-13 LAB
ALBUMIN SERPL-MCNC: 3.6 G/DL (ref 3.5–5)
ALBUMIN/GLOB SERPL: 0.9 (ref 1.1–2.2)
ALP SERPL-CCNC: 144 U/L (ref 45–117)
ALT SERPL-CCNC: 29 U/L (ref 12–78)
ANION GAP SERPL CALC-SCNC: 8 MMOL/L (ref 2–12)
AST SERPL-CCNC: 16 U/L (ref 15–37)
BILIRUB SERPL-MCNC: 0.4 MG/DL (ref 0.2–1)
BUN SERPL-MCNC: 22 MG/DL (ref 6–20)
BUN/CREAT SERPL: 24 (ref 12–20)
CALCIUM SERPL-MCNC: 8.7 MG/DL (ref 8.5–10.1)
CHLORIDE SERPL-SCNC: 105 MMOL/L (ref 97–108)
CHOLEST SERPL-MCNC: 264 MG/DL
CO2 SERPL-SCNC: 26 MMOL/L (ref 21–32)
CREAT SERPL-MCNC: 0.92 MG/DL (ref 0.7–1.3)
CREAT UR-MCNC: 123 MG/DL
GLOBULIN SER CALC-MCNC: 4.2 G/DL (ref 2–4)
GLUCOSE SERPL-MCNC: 128 MG/DL (ref 65–100)
HDLC SERPL-MCNC: 42 MG/DL
HDLC SERPL: 6.3 (ref 0–5)
HIV 1+2 AB+HIV1 P24 AG SERPL QL IA: NONREACTIVE
HIV 1/2 RESULT COMMENT: NORMAL
LDLC SERPL CALC-MCNC: 196.4 MG/DL (ref 0–100)
MICROALBUMIN UR-MCNC: 1.1 MG/DL
MICROALBUMIN/CREAT UR-RTO: 9 MG/G (ref 0–30)
POTASSIUM SERPL-SCNC: 4.3 MMOL/L (ref 3.5–5.1)
PROT SERPL-MCNC: 7.8 G/DL (ref 6.4–8.2)
PSA SERPL-MCNC: 1.2 NG/ML (ref 0.01–4)
SODIUM SERPL-SCNC: 139 MMOL/L (ref 136–145)
TRIGL SERPL-MCNC: 128 MG/DL
VLDLC SERPL CALC-MCNC: 25.6 MG/DL

## 2024-12-13 RX ORDER — ATORVASTATIN CALCIUM 80 MG/1
80 TABLET, FILM COATED ORAL DAILY
Qty: 90 TABLET | Refills: 1 | Status: SHIPPED | OUTPATIENT
Start: 2024-12-13

## 2024-12-27 ENCOUNTER — HOSPITAL ENCOUNTER (OUTPATIENT)
Facility: HOSPITAL | Age: 54
Discharge: HOME | End: 2024-12-30
Payer: COMMERCIAL

## 2024-12-27 DIAGNOSIS — Z72.0 TOBACCO USE: ICD-10-CM

## 2024-12-27 DIAGNOSIS — Z87.891 PERSONAL HISTORY OF TOBACCO USE: ICD-10-CM

## 2024-12-27 DIAGNOSIS — E11.65 UNCONTROLLED TYPE 2 DIABETES MELLITUS WITH HYPERGLYCEMIA (HCC): ICD-10-CM

## 2024-12-27 DIAGNOSIS — R74.8 ELEVATED SERUM ALKALINE PHOSPHATASE LEVEL: ICD-10-CM

## 2024-12-27 DIAGNOSIS — Z87.891 PERSONAL HISTORY OF TOBACCO USE, PRESENTING HAZARDS TO HEALTH: ICD-10-CM

## 2024-12-27 PROCEDURE — 76705 ECHO EXAM OF ABDOMEN: CPT

## 2024-12-27 PROCEDURE — 71271 CT THORAX LUNG CANCER SCR C-: CPT

## 2025-01-06 DIAGNOSIS — R93.2 ABNORMAL LIVER DIAGNOSTIC IMAGING: Primary | ICD-10-CM

## 2025-01-06 DIAGNOSIS — R79.89 ABNORMAL LIVER FUNCTION TESTS: ICD-10-CM

## 2025-01-08 ENCOUNTER — CLINICAL DOCUMENTATION (OUTPATIENT)
Age: 55
End: 2025-01-08

## 2025-01-28 ENCOUNTER — PHARMACY VISIT (OUTPATIENT)
Age: 55
End: 2025-01-28

## 2025-01-28 VITALS — WEIGHT: 204.4 LBS | BODY MASS INDEX: 31.08 KG/M2

## 2025-01-28 DIAGNOSIS — E11.65 UNCONTROLLED TYPE 2 DIABETES MELLITUS WITH HYPERGLYCEMIA (HCC): Primary | ICD-10-CM

## 2025-01-28 DIAGNOSIS — E11.65 TYPE 2 DIABETES MELLITUS WITH HYPERGLYCEMIA, WITHOUT LONG-TERM CURRENT USE OF INSULIN (HCC): ICD-10-CM

## 2025-01-28 RX ORDER — LANCING DEVICE
EACH MISCELLANEOUS
Qty: 1 EACH | Refills: 0 | Status: SHIPPED | OUTPATIENT
Start: 2025-01-28

## 2025-01-28 RX ORDER — LANCETS 30 GAUGE
EACH MISCELLANEOUS
Qty: 100 EACH | Refills: 3 | Status: SHIPPED | OUTPATIENT
Start: 2025-01-28

## 2025-01-28 RX ORDER — BLOOD-GLUCOSE METER
KIT MISCELLANEOUS
Qty: 1 KIT | Refills: 0 | Status: SHIPPED | OUTPATIENT
Start: 2025-01-28

## 2025-01-28 RX ORDER — GLUCOSAMINE HCL/CHONDROITIN SU 500-400 MG
CAPSULE ORAL
Qty: 100 STRIP | Refills: 3 | Status: SHIPPED | OUTPATIENT
Start: 2025-01-28

## 2025-01-28 NOTE — PROGRESS NOTES
Pharmacy Progress Note - Diabetes Management    S/O: Mr. Rudi Oh is a 54 y.o. male, referred by Rosa Bennett, APRN - NP,  has a past medical history of Arthritis, Diabetes (HCC), Elevated liver enzymes, Hypercholesterolemia, Psoriasis, and Psoriatic arthritis (HCC)..  Pt was seen today for diabetes management.  Patient's last A1c was:   Hemoglobin A1C   Date Value Ref Range Status   03/31/2021 7.6 (H) 4.0 - 5.6 % Final     Comment:     NEW METHOD PLEASE NOTE NEW REFERENCE RANGE  (NOTE)  HbA1C Interpretive Ranges  <5.7              Normal  5.7 - 6.4         Consider Prediabetes  >6.5              Consider Diabetes       Hemoglobin A1C, POC   Date Value Ref Range Status   12/12/2024 9.6 % Final       Subjective      Interim Update: Pt was last seen by PCP on 12/12/24 for f/up on chronic conditions.  Labs drawn.  A1c was noted to be elevated and uncontrolled.  Pt's Trulicity dose was increased from 0.75 mg to 1.5 mg weekly.  Pt's LDL was also elevated.  Dose of Atorvastatin was increased from 40 mg to 80 mg daily.    Pt arrives to clinic today and states that he has picked up the higher dose of Trulicity but had 3 boxes of Trulicity 0.75 mg weekly and didn't want to waste it.  He has been dosing 0.75 mg every 4 days to use it up.  Denies adverse effects from how he has been taking Trulicity.    Pt states he does not have Jardiance - hasn't had for 3-4 months.  He just ran out of Januvia yesterday.  Has Glimepiride and takes daily.    Discussed pathophysiology of DM, complications, progression, BG/A1c goals.  Discussed food choices - Healthy Plate Method, carb goals, how to read a nutrition label.  Discussed physical activity goal - 150 min moderate exercise per week.  Discussed medications - mechanism of action, side effects, efficacy.        Diabetes Management:  - Previous anti-hyperglycemic agents includes: Metformin (d/c'd d/t nausea)    Current anti-hyperglycemic regimen includes:    Key

## 2025-01-28 NOTE — PATIENT INSTRUCTIONS
Your A1c was 9.6% which was uncontrolled (goal <7%).    STOP Januvia  RESTART Jardiance 25 mg daily - I sent in prescription for this  SWITCH Trulicity to 1.5 mg (take 2 injections of 0.75 mg) weekly  Continue Glimepiride 4 mg every morning    Check your blood sugar - fasting and 1-2 hours after dinner.  I sent in supplies for this.    Blood Sugar Goal  Fastin-130 mg/dL  1-2 after meals: Less than 180 mg/dL    Carbohydrate Goals  Meal: 45-60 grams   Snacks: 15 grams    Physical Activity Goals  30 minutes 5x per week - can be seated exercises    Pharmacist Contact Information:  Kristan Sky, PharmD, BCGP, BCACP  Work Phone: [phone] (option #2)

## 2025-02-18 ENCOUNTER — PHARMACY VISIT (OUTPATIENT)
Age: 55
End: 2025-02-18

## 2025-02-18 DIAGNOSIS — E11.65 UNCONTROLLED TYPE 2 DIABETES MELLITUS WITH HYPERGLYCEMIA (HCC): Primary | ICD-10-CM

## 2025-02-18 NOTE — PROGRESS NOTES
Pharmacy Progress Note     Pt arrived to clinic for his PharmD diabetes management f/up appt and stated that 3 days ago he started having a sore throat and cough.  He states that he has not tested for COVID.      This author advised the pt to test in future and to contact the office in future to switch in person visits to virtual if he is not feeling well.    Pt got up and left the appt.  Appt was not completed.    There are no discontinued medications.  No orders of the defined types were placed in this encounter.        Kristan Sky, PharmD, BCGP, BCACP  Clinical Pharmacist Specialist      For Pharmacy Admin Tracking Only    Program: Medical Group  CPA in place:  Yes  Recommendation Provided To: Patient/Caregiver: 0 via In person  Intervention Accepted By: Patient/Caregiver: 0  Time Spent (min): 5

## 2025-02-19 ENCOUNTER — TELEPHONE (OUTPATIENT)
Dept: PHARMACY | Facility: CLINIC | Age: 55
End: 2025-02-19

## 2025-02-19 NOTE — TELEPHONE ENCOUNTER
Kristan-  Patient has already been rescheduled for 2/25/25 at 3:30 pm by RONI WOOD   [facility]   Ambulatory Pharmacy Clinical   [phone]  Department, toll free: [phone], option 2     For Pharmacy Admin Tracking Only    Program: Medical Group  Gap Closed?: Yes   Time Spent (min): 5

## 2025-02-19 NOTE — TELEPHONE ENCOUNTER
----- Message from Kristan Sky [facility] sent at 2/18/2025  8:41 AM EST -----  This pt is upset that I advised him to reschedule appts for virtual if he is not feeling well in future.  We weren't able to complete our visit.  Would you reach out to him in a day or so to reschedule?

## 2025-03-07 ENCOUNTER — TELEPHONE (OUTPATIENT)
Dept: PHARMACY | Facility: CLINIC | Age: 55
End: 2025-03-07

## 2025-03-07 NOTE — TELEPHONE ENCOUNTER
**[facility]**    Attempt made to contact patient at the home number.    Missed appointment (no show) on 3/3/25 at 3:30 pm with Kristan Sky    Left a message requesting a call back at [phone] to schedule the appointment    *Letter Sent*    [facility]   Ambulatory Pharmacy Clinical   [phone]  Department, toll free: [phone], option 2     For Pharmacy Admin Tracking Only    Program: Medical Group  Gap Closed?: No   Time Spent (min): 5

## 2025-03-13 DIAGNOSIS — E11.65 TYPE 2 DIABETES MELLITUS WITH HYPERGLYCEMIA, WITHOUT LONG-TERM CURRENT USE OF INSULIN (HCC): ICD-10-CM

## 2025-03-13 NOTE — TELEPHONE ENCOUNTER
Last appointment: 12/12/24 Kareen  Next appointment: None  Previous refill encounter(s): 11/1/23 90 + 1     Requested Prescriptions     Pending Prescriptions Disp Refills    glimepiride (AMARYL) 4 MG tablet 90 tablet 1     Sig: Take 1 tablet by mouth every morning     For Pharmacy Admin Tracking Only    Program: Medication Refill  CPA in place:    Recommendation Provided To:   Intervention Detail: New Rx: 1, reason: Patient Preference  Intervention Accepted By:   Gap Closed?:    Time Spent (min): 5

## 2025-03-17 RX ORDER — GLIMEPIRIDE 4 MG/1
4 TABLET ORAL EVERY MORNING
Qty: 90 TABLET | Refills: 0 | Status: SHIPPED | OUTPATIENT
Start: 2025-03-17

## 2025-03-17 NOTE — TELEPHONE ENCOUNTER
Informed pt that his Glimepiride was approved,and that he needed to make an In Office Appt with MARGARET Mathur.Pt's scheduled to see MARGARET Mathur on 5/21/25 @ 8:40 AM.

## 2025-04-04 LAB — HBA1C MFR BLD HPLC: 7.7 %

## 2025-04-15 DIAGNOSIS — E11.65 TYPE 2 DIABETES MELLITUS WITH HYPERGLYCEMIA, WITHOUT LONG-TERM CURRENT USE OF INSULIN (HCC): ICD-10-CM

## 2025-04-15 DIAGNOSIS — E11.65 UNCONTROLLED TYPE 2 DIABETES MELLITUS WITH HYPERGLYCEMIA (HCC): ICD-10-CM

## 2025-04-15 RX ORDER — DULAGLUTIDE 1.5 MG/.5ML
INJECTION, SOLUTION SUBCUTANEOUS
Qty: 4 ML | Refills: 0 | Status: SHIPPED | OUTPATIENT
Start: 2025-04-15

## 2025-04-15 RX ORDER — EMPAGLIFLOZIN 25 MG/1
25 TABLET, FILM COATED ORAL DAILY
Qty: 30 TABLET | Refills: 0 | Status: SHIPPED | OUTPATIENT
Start: 2025-04-15

## 2025-04-15 NOTE — TELEPHONE ENCOUNTER
PCP: Rosa Mathur APRN - NP    Last appt: 2/18/2025     Future Appointments   Date Time Provider Department Center   5/21/2025  8:40 AM Rosa Mathur APRN - NP [facility]   6/19/2025  8:30 AM Clovis Betts MD [facility]       Requested Prescriptions     Pending Prescriptions Disp Refills    TRULICITY 1.5 MG/0.5ML SC injection [Pharmacy Med Name: Trulicity 1.5 MG/0.5ML Subcutaneous Solution Pen-injector] 4 mL 0     Sig: INJECT 0.5 ML SUBCUTANEOUSLY  ONCE A WEEK    JARDIANCE 25 MG tablet [Pharmacy Med Name: Jardiance 25 MG Oral Tablet] 30 tablet 0     Sig: Take 1 tablet by mouth once daily         Prior labs and Blood pressures:  BP Readings from Last 3 Encounters:   12/12/24 96/64   03/25/24 112/75   02/02/24 110/74     Lab Results   Component Value Date/Time     12/12/2024 03:23 PM    K 4.3 12/12/2024 03:23 PM     12/12/2024 03:23 PM    CO2 26 12/12/2024 03:23 PM    BUN 22 12/12/2024 03:23 PM    GFRAA >60 08/13/2022 01:31 PM     Lab Results   Component Value Date/Time    PFK3CUEQ 9.6 12/12/2024 02:50 PM     Lab Results   Component Value Date/Time    CHOL 264 12/12/2024 03:23 PM    HDL 42 12/12/2024 03:23 PM    .4 12/12/2024 03:23 PM    .2 12/12/2022 11:17 AM    VLDL 25.6 12/12/2024 03:23 PM

## 2025-05-15 DIAGNOSIS — E11.65 TYPE 2 DIABETES MELLITUS WITH HYPERGLYCEMIA, WITHOUT LONG-TERM CURRENT USE OF INSULIN (HCC): ICD-10-CM

## 2025-05-15 DIAGNOSIS — E11.65 UNCONTROLLED TYPE 2 DIABETES MELLITUS WITH HYPERGLYCEMIA (HCC): ICD-10-CM

## 2025-05-15 RX ORDER — EMPAGLIFLOZIN 25 MG/1
25 TABLET, FILM COATED ORAL DAILY
Qty: 30 TABLET | Refills: 0 | Status: SHIPPED | OUTPATIENT
Start: 2025-05-15

## 2025-05-15 RX ORDER — DULAGLUTIDE 1.5 MG/.5ML
INJECTION, SOLUTION SUBCUTANEOUS
Qty: 4 ML | Refills: 0 | Status: SHIPPED | OUTPATIENT
Start: 2025-05-15

## 2025-05-15 NOTE — TELEPHONE ENCOUNTER
PCP: Rosa Mathur APRN - NP    Last appt: 2/18/2025     Future Appointments   Date Time Provider Department Center   5/21/2025  8:40 AM Rosa Mathur APRN - NP [facility]   6/19/2025  8:30 AM Clovis Betts MD [facility]       Requested Prescriptions     Pending Prescriptions Disp Refills    TRULICITY 1.5 MG/0.5ML SC injection [Pharmacy Med Name: Trulicity 1.5 MG/0.5ML Subcutaneous Solution Pen-injector] 4 mL 0     Sig: INJECT 0.5 ML(S) SUBCUTANEOUSLY ONCE A WEEK    JARDIANCE 25 MG tablet [Pharmacy Med Name: Jardiance 25 MG Oral Tablet] 30 tablet 0     Sig: Take 1 tablet by mouth once daily       Prior labs and Blood pressures:  BP Readings from Last 3 Encounters:   12/12/24 96/64   03/25/24 112/75   02/02/24 110/74     Lab Results   Component Value Date/Time     12/12/2024 03:23 PM    K 4.3 12/12/2024 03:23 PM     12/12/2024 03:23 PM    CO2 26 12/12/2024 03:23 PM    BUN 22 12/12/2024 03:23 PM    GFRAA >60 08/13/2022 01:31 PM     Lab Results   Component Value Date/Time    HQN5HPSY 9.6 12/12/2024 02:50 PM     Lab Results   Component Value Date/Time    CHOL 264 12/12/2024 03:23 PM    HDL 42 12/12/2024 03:23 PM    .4 12/12/2024 03:23 PM    .2 12/12/2022 11:17 AM    VLDL 25.6 12/12/2024 03:23 PM     No results found for: \"VITD3\"    No results found for: \"TSH\", \"TSH2\", \"TSH3\"

## 2025-06-19 ENCOUNTER — OFFICE VISIT (OUTPATIENT)
Age: 55
End: 2025-06-19
Payer: COMMERCIAL

## 2025-06-19 VITALS
TEMPERATURE: 97.3 F | OXYGEN SATURATION: 95 % | HEART RATE: 90 BPM | DIASTOLIC BLOOD PRESSURE: 69 MMHG | BODY MASS INDEX: 30.04 KG/M2 | HEIGHT: 68 IN | WEIGHT: 198.2 LBS | SYSTOLIC BLOOD PRESSURE: 100 MMHG

## 2025-06-19 DIAGNOSIS — R74.8 ELEVATED ALKALINE PHOSPHATASE IN NEWBORN: Primary | ICD-10-CM

## 2025-06-19 DIAGNOSIS — R74.8 ELEVATED ALKALINE PHOSPHATASE LEVEL: ICD-10-CM

## 2025-06-19 PROCEDURE — 91200 LIVER ELASTOGRAPHY: CPT | Performed by: INTERNAL MEDICINE

## 2025-06-19 PROCEDURE — 99204 OFFICE O/P NEW MOD 45 MIN: CPT | Performed by: INTERNAL MEDICINE

## 2025-06-19 ASSESSMENT — PATIENT HEALTH QUESTIONNAIRE - PHQ9
DEPRESSION UNABLE TO ASSESS: FUNCTIONAL CAPACITY MOTIVATION LIMITS ACCURACY
SUM OF ALL RESPONSES TO PHQ QUESTIONS 1-9: 0
SUM OF ALL RESPONSES TO PHQ QUESTIONS 1-9: 0
2. FEELING DOWN, DEPRESSED OR HOPELESS: NOT AT ALL
SUM OF ALL RESPONSES TO PHQ QUESTIONS 1-9: 0
1. LITTLE INTEREST OR PLEASURE IN DOING THINGS: NOT AT ALL
SUM OF ALL RESPONSES TO PHQ QUESTIONS 1-9: 0

## 2025-06-19 NOTE — PROGRESS NOTES
Chief Complaint   Patient presents with    New Patient     New Patient   Records are in EPIC     Vitals:    06/19/25 0831   BP: 100/69   BP Site: Left Upper Arm   Patient Position: Sitting   Pulse: 90   Temp: 97.3 °F (36.3 °C)   TempSrc: Temporal   SpO2: 95%   Weight: 89.9 kg (198 lb 3.2 oz)   Height: 1.727 m (5' 8\")     .  \"Have you been to the ER, urgent care clinic since your last visit?  Hospitalized since your last visit?\"    NO    “Have you seen or consulted any other health care providers outside of [facility] since your last visit?”    NO            Click Here for Release of Records Request

## 2025-06-19 NOTE — PROGRESS NOTES
[facility]    Clovis Betts MD, FACP, MACG, FAASLD  MD Madeline Calle, VESNA Navarro, [facility]  Roshni Roman, [facility]    Freya Mcmillan, Practice Administrator   Liver Transplant and Liver Cancer Coordination:  Sarah Barrett, HAYES Almonte, RN  Shell Roblero RN Clinical Research:  Madeline Good, HAYES Carballo, HAYES Garvey, HAYES Mariee, HAYES   [facility] [place] [facility], [facility]  [address], [address]  [place] [id number] [phone]  FAX: [phone]       Patient Care Team:  Rosa Mathur APRN - NP as PCP - General  Rosa Mathur APRN - NP as PCP - Empaneled Provider  Washington Salter III, MD as Physician  Paul Wang MD as Physician  Isaac Pelletier MD as Consulting Physician  Seven Womack DO as Consulting Physician  Rivera Muhammad MD as Consulting Physician  Kristan Sky [facility] (Pharmacist)      Patient Active Problem List   Diagnosis    Tobacco use    Long term current use of non-steroidal anti-inflammatories (NSAID)    Hyperlipidemia    Gastroesophageal reflux disease    Obesity (BMI 30-39.9)    Psoriatic arthritis (HCC)    Primary osteoarthritis of both knees    PPD positive    Long-term use of immunosuppressant medication    Type 2 diabetes mellitus (HCC)       The clinicians listed above have asked me to see Rudi Oh in consultation regarding elevated alkaline phosphatase    All medical records sent by the referring physicians were reviewed including imaging studies     The patient is a 54 y.o. male who with intermittent elevation in ALP dating abck to 8/2022.      Serologic evaluation for markers of chronic liver disease was negative for HCV, HBV,     The most recent imaging of the liver was Ultrasound performed in 12/2024.  Results suggest cirrhosis.  No liver mass lesions noted.    Assessment of liver fibrosis

## 2025-06-20 DIAGNOSIS — E11.65 TYPE 2 DIABETES MELLITUS WITH HYPERGLYCEMIA, WITHOUT LONG-TERM CURRENT USE OF INSULIN (HCC): ICD-10-CM

## 2025-06-20 DIAGNOSIS — E11.65 UNCONTROLLED TYPE 2 DIABETES MELLITUS WITH HYPERGLYCEMIA (HCC): ICD-10-CM

## 2025-06-20 NOTE — TELEPHONE ENCOUNTER
PCP: Rosa Mathur, APRN - NP    Last appt: 2/18/2025   Future Appointments   Date Time Provider Department Center   12/17/2025  8:20 AM Madeline Álvarez PA [facility]       Requested Prescriptions     Pending Prescriptions Disp Refills    JARDIANCE 25 MG tablet [Pharmacy Med Name: Jardiance 25 MG Oral Tablet] 30 tablet 0     Sig: Take 1 tablet by mouth once daily    glimepiride (AMARYL) 4 MG tablet [Pharmacy Med Name: Glimepiride 4 MG Oral Tablet] 90 tablet 0     Sig: TAKE 1 TABLET BY MOUTH ONCE DAILY IN THE MORNING    TRULICITY 1.5 MG/0.5ML SC injection [Pharmacy Med Name: Trulicity 1.5 MG/0.5ML Subcutaneous Solution Pen-injector] 8 mL 0     Sig: INJECT 0.5 ML SUBCUTANEOUSLY  ONCE A WEEK

## 2025-06-21 LAB
ALBUMIN SERPL-MCNC: 3.8 G/DL (ref 3.5–5)
ALBUMIN/GLOB SERPL: 0.9 (ref 1.1–2.2)
ALP SERPL-CCNC: 159 U/L (ref 45–117)
ALT SERPL-CCNC: 25 U/L (ref 12–78)
ANION GAP SERPL CALC-SCNC: 4 MMOL/L (ref 2–12)
AST SERPL-CCNC: <3 U/L (ref 15–37)
BASOPHILS # BLD: 0.03 K/UL (ref 0–0.1)
BASOPHILS NFR BLD: 0.3 % (ref 0–1)
BILIRUB SERPL-MCNC: 0.4 MG/DL (ref 0.2–1)
BUN SERPL-MCNC: 26 MG/DL (ref 6–20)
BUN/CREAT SERPL: 25 (ref 12–20)
CALCIUM SERPL-MCNC: 9.4 MG/DL (ref 8.5–10.1)
CHLORIDE SERPL-SCNC: 108 MMOL/L (ref 97–108)
CO2 SERPL-SCNC: 27 MMOL/L (ref 21–32)
CREAT SERPL-MCNC: 1.04 MG/DL (ref 0.7–1.3)
DIFFERENTIAL METHOD BLD: ABNORMAL
EOSINOPHIL # BLD: 0.14 K/UL (ref 0–0.4)
EOSINOPHIL NFR BLD: 1.4 % (ref 0–7)
ERYTHROCYTE [DISTWIDTH] IN BLOOD BY AUTOMATED COUNT: 15.2 % (ref 11.5–14.5)
FERRITIN SERPL-MCNC: 200 NG/ML (ref 26–388)
GLOBULIN SER CALC-MCNC: 4.2 G/DL (ref 2–4)
GLUCOSE SERPL-MCNC: 158 MG/DL (ref 65–100)
HCT VFR BLD AUTO: 47.5 % (ref 36.6–50.3)
HGB BLD-MCNC: 15.1 G/DL (ref 12.1–17)
IMM GRANULOCYTES # BLD AUTO: 0.03 K/UL (ref 0–0.04)
IMM GRANULOCYTES NFR BLD AUTO: 0.3 % (ref 0–0.5)
IRON SATN MFR SERPL: 20 % (ref 20–50)
IRON SERPL-MCNC: 66 UG/DL (ref 35–150)
LYMPHOCYTES # BLD: 3.74 K/UL (ref 0.8–3.5)
LYMPHOCYTES NFR BLD: 37.6 % (ref 12–49)
MCH RBC QN AUTO: 27.1 PG (ref 26–34)
MCHC RBC AUTO-ENTMCNC: 31.8 G/DL (ref 30–36.5)
MCV RBC AUTO: 85.1 FL (ref 80–99)
MONOCYTES # BLD: 0.84 K/UL (ref 0–1)
MONOCYTES NFR BLD: 8.4 % (ref 5–13)
NEUTS SEG # BLD: 5.17 K/UL (ref 1.8–8)
NEUTS SEG NFR BLD: 52 % (ref 32–75)
NRBC # BLD: 0 K/UL (ref 0–0.01)
NRBC BLD-RTO: 0 PER 100 WBC
PLATELET # BLD AUTO: 314 K/UL (ref 150–400)
PMV BLD AUTO: 10 FL (ref 8.9–12.9)
POTASSIUM SERPL-SCNC: 4.7 MMOL/L (ref 3.5–5.1)
PROT SERPL-MCNC: 8 G/DL (ref 6.4–8.2)
RBC # BLD AUTO: 5.58 M/UL (ref 4.1–5.7)
SODIUM SERPL-SCNC: 139 MMOL/L (ref 136–145)
TIBC SERPL-MCNC: 325 UG/DL (ref 250–450)
WBC # BLD AUTO: 10 K/UL (ref 4.1–11.1)

## 2025-06-21 RX ORDER — GLIMEPIRIDE 4 MG/1
4 TABLET ORAL EVERY MORNING
Qty: 30 TABLET | Refills: 1 | Status: SHIPPED | OUTPATIENT
Start: 2025-06-21

## 2025-06-21 RX ORDER — DULAGLUTIDE 1.5 MG/.5ML
INJECTION, SOLUTION SUBCUTANEOUS
Qty: 8 ML | Refills: 0 | Status: SHIPPED | OUTPATIENT
Start: 2025-06-21

## 2025-06-21 RX ORDER — EMPAGLIFLOZIN 25 MG/1
25 TABLET, FILM COATED ORAL DAILY
Qty: 30 TABLET | Refills: 1 | Status: SHIPPED | OUTPATIENT
Start: 2025-06-21

## 2025-06-22 LAB
A1AT SERPL-MCNC: 147 MG/DL (ref 101–187)
MITOCHONDRIA M2 IGG SER-ACNC: <20 UNITS (ref 0–20)
SMA IGG SER-ACNC: 31 UNITS (ref 0–19)

## 2025-06-24 LAB — IGG4 SER-MCNC: 12 MG/DL (ref 2–96)

## 2025-06-26 ENCOUNTER — RESULTS FOLLOW-UP (OUTPATIENT)
Age: 55
End: 2025-06-26

## 2025-06-26 ENCOUNTER — OFFICE VISIT (OUTPATIENT)
Age: 55
End: 2025-06-26
Payer: COMMERCIAL

## 2025-06-26 VITALS
HEART RATE: 99 BPM | TEMPERATURE: 97.1 F | HEIGHT: 68 IN | RESPIRATION RATE: 16 BRPM | DIASTOLIC BLOOD PRESSURE: 72 MMHG | WEIGHT: 203 LBS | SYSTOLIC BLOOD PRESSURE: 120 MMHG | BODY MASS INDEX: 30.77 KG/M2 | OXYGEN SATURATION: 96 %

## 2025-06-26 DIAGNOSIS — R30.0 DYSURIA: ICD-10-CM

## 2025-06-26 DIAGNOSIS — R79.89 ELEVATED LIVER FUNCTION TESTS: ICD-10-CM

## 2025-06-26 DIAGNOSIS — G47.9 SLEEP DISTURBANCE: ICD-10-CM

## 2025-06-26 DIAGNOSIS — E11.65 UNCONTROLLED TYPE 2 DIABETES MELLITUS WITH HYPERGLYCEMIA (HCC): ICD-10-CM

## 2025-06-26 DIAGNOSIS — E78.2 MIXED HYPERLIPIDEMIA: ICD-10-CM

## 2025-06-26 DIAGNOSIS — L40.50 PSORIATIC ARTHRITIS (HCC): Primary | ICD-10-CM

## 2025-06-26 DIAGNOSIS — M17.11 PRIMARY OSTEOARTHRITIS OF RIGHT KNEE: ICD-10-CM

## 2025-06-26 LAB — HBA1C MFR BLD: 8.2 %

## 2025-06-26 PROCEDURE — PBSHW AMB POC HEMOGLOBIN A1C: Performed by: NURSE PRACTITIONER

## 2025-06-26 PROCEDURE — 99214 OFFICE O/P EST MOD 30 MIN: CPT | Performed by: NURSE PRACTITIONER

## 2025-06-26 PROCEDURE — 3052F HG A1C>EQUAL 8.0%<EQUAL 9.0%: CPT | Performed by: NURSE PRACTITIONER

## 2025-06-26 PROCEDURE — 83036 HEMOGLOBIN GLYCOSYLATED A1C: CPT | Performed by: NURSE PRACTITIONER

## 2025-06-26 RX ORDER — ATORVASTATIN CALCIUM 80 MG/1
80 TABLET, FILM COATED ORAL DAILY
Qty: 90 TABLET | Refills: 1 | Status: SHIPPED | OUTPATIENT
Start: 2025-06-26

## 2025-06-26 RX ORDER — GLIMEPIRIDE 4 MG/1
4 TABLET ORAL EVERY MORNING
Qty: 90 TABLET | Refills: 1 | Status: SHIPPED | OUTPATIENT
Start: 2025-06-26

## 2025-06-26 RX ORDER — LANCING DEVICE
EACH MISCELLANEOUS
Qty: 1 EACH | Refills: 0 | Status: SHIPPED | OUTPATIENT
Start: 2025-06-26

## 2025-06-26 RX ORDER — TRAZODONE HYDROCHLORIDE 50 MG/1
50 TABLET ORAL NIGHTLY
Qty: 30 TABLET | Refills: 1 | Status: SHIPPED | OUTPATIENT
Start: 2025-06-26

## 2025-06-26 RX ORDER — GLUCOSAMINE HCL/CHONDROITIN SU 500-400 MG
CAPSULE ORAL
Qty: 100 STRIP | Refills: 3 | Status: SHIPPED | OUTPATIENT
Start: 2025-06-26

## 2025-06-26 RX ORDER — DULAGLUTIDE 1.5 MG/.5ML
1.5 INJECTION, SOLUTION SUBCUTANEOUS WEEKLY
Qty: 8 ML | Refills: 3 | Status: SHIPPED | OUTPATIENT
Start: 2025-06-26

## 2025-06-26 SDOH — ECONOMIC STABILITY: FOOD INSECURITY: WITHIN THE PAST 12 MONTHS, THE FOOD YOU BOUGHT JUST DIDN'T LAST AND YOU DIDN'T HAVE MONEY TO GET MORE.: NEVER TRUE

## 2025-06-26 SDOH — ECONOMIC STABILITY: FOOD INSECURITY: WITHIN THE PAST 12 MONTHS, YOU WORRIED THAT YOUR FOOD WOULD RUN OUT BEFORE YOU GOT MONEY TO BUY MORE.: NEVER TRUE

## 2025-06-26 NOTE — PROGRESS NOTES
[facility]  Clinic Note     Rudi Oh (: 1970) is a 54 y.o. male, established patient, here for evaluation of the following chief complaint(s):  Diabetes       ASSESSMENT/PLAN:    Assessment & Plan  Psoriatic arthritis (HCC)   Seeing rheumatologist at [facility] Dr. Mick Young. Review of last office encounter show potential med change: \"Will look into replacing Xeljanz with Rinvoq 15 mg daily \"        Uncontrolled type 2 diabetes mellitus with hyperglycemia (HCC)   Chronic. Not at goal of < 7.0  2025 A1c = 8.2  2024 A1c = 9.6  23 A1c = 6.4  2023 A1c = 6.2   -Diet and lifestyle modification encouraged for weight loss and chronic disease prevention/ management   Orders:    Albumin/Creatinine Ratio, Urine; Future    AMB POC HEMOGLOBIN A1C    atorvastatin (LIPITOR) 80 MG tablet; Take 1 tablet by mouth daily    blood glucose monitor strips; Check blood sugar daily    empagliflozin (JARDIANCE) 25 MG tablet; Take 1 tablet by mouth daily    glimepiride (AMARYL) 4 MG tablet; Take 1 tablet by mouth every morning    Lancet Devices (LANCING DEVICE) MISC; Check blood sugar daily    dulaglutide (TRULICITY) 1.5 MG/0.5ML SC injection; Inject 0.5 mLs into the skin once a week    Ambulatory Referral to Primary Care Pharmacist    Mixed hyperlipidemia   - On statin  Lab Results   Component Value Date    CHOL 264 (H) 2024    TRIG 128 2024    HDL 42 2024    .4 (H) 2024    VLDL 25.6 2024    CHOLHDLRATIO 6.3 (H) 2024     Orders:    atorvastatin (LIPITOR) 80 MG tablet; Take 1 tablet by mouth daily    Elevated liver function tests   - Has met with the liver institute, labs collected. Has follow up in December  - Reviewed ultrasound results 2024 = unitsCirrhotic morphology of the liver. No mass lesion.    Lab Results   Component Value Date    ALT 25 2025    AST <3 (L) 2025    GGT 15 2022    ALKPHOS 159 (H)

## 2025-06-26 NOTE — ASSESSMENT & PLAN NOTE
Seeing rheumatologist at [facility] Dr. Mick Young. Review of last office encounter show potential med change: \"Will look into replacing Xeljanz with Rinvoq 15 mg daily \"

## 2025-06-26 NOTE — ASSESSMENT & PLAN NOTE
Chronic. Not at goal of < 7.0  6/26/2025 A1c = 8.2  12/12/2024 A1c = 9.6  11/19/23 A1c = 6.4  5/31/2023 A1c = 6.2   -Diet and lifestyle modification encouraged for weight loss and chronic disease prevention/ management   Orders:    Albumin/Creatinine Ratio, Urine; Future    AMB POC HEMOGLOBIN A1C    atorvastatin (LIPITOR) 80 MG tablet; Take 1 tablet by mouth daily    blood glucose monitor strips; Check blood sugar daily    empagliflozin (JARDIANCE) 25 MG tablet; Take 1 tablet by mouth daily    glimepiride (AMARYL) 4 MG tablet; Take 1 tablet by mouth every morning    Lancet Devices (LANCING DEVICE) MISC; Check blood sugar daily    dulaglutide (TRULICITY) 1.5 MG/0.5ML SC injection; Inject 0.5 mLs into the skin once a week    Ambulatory Referral to Primary Care Pharmacist

## 2025-06-26 NOTE — ASSESSMENT & PLAN NOTE
- On statin  Lab Results   Component Value Date    CHOL 264 (H) 12/12/2024    TRIG 128 12/12/2024    HDL 42 12/12/2024    .4 (H) 12/12/2024    VLDL 25.6 12/12/2024    CHOLHDLRATIO 6.3 (H) 12/12/2024     Orders:    atorvastatin (LIPITOR) 80 MG tablet; Take 1 tablet by mouth daily

## 2025-06-26 NOTE — PROGRESS NOTES
Chief Complaint   Patient presents with    Diabetes         \"Have you been to the ER, urgent care clinic since your last visit?  Hospitalized since your last visit?\"    NO    “Have you seen or consulted any other health care providers outside of [facility] since your last visit?”    NO            Click Here for Release of Records Request           6/19/2025     8:36 AM   PHQ-9    Depression Unable to Assess Functional capacity motivation limits accuracy   Little interest or pleasure in doing things 0   Feeling down, depressed, or hopeless 0   PHQ-2 Score 0   PHQ-9 Total Score 0           Financial Resource Strain: Low Risk  (12/12/2024)    Overall Financial Resource Strain (CARDIA)     Difficulty of Paying Living Expenses: Not hard at all      Food Insecurity: No Food Insecurity (6/26/2025)    Hunger Vital Sign     Worried About Running Out of Food in the Last Year: Never true     Ran Out of Food in the Last Year: Never true          Health Maintenance Due   Topic Date Due    Hepatitis B vaccine (1 of 3 - 19+ 3-dose series) Never done    DTaP/Tdap/Td vaccine (1 - Tdap) Never done    Pneumococcal 50+ years Vaccine (2 of 2 - PCV) 11/11/2016    Shingles vaccine (1 of 2) Never done    Diabetic retinal exam  06/07/2024    COVID-19 Vaccine (3 - 2024-25 season) 09/01/2024

## 2025-06-27 ENCOUNTER — TELEPHONE (OUTPATIENT)
Age: 55
End: 2025-06-27

## 2025-06-27 LAB
ANA BY IFA: POSITIVE
APPEARANCE UR: CLEAR
BACTERIA URNS QL MICRO: NEGATIVE /HPF
BILIRUB UR QL: NEGATIVE
COLOR UR: ABNORMAL
CREAT UR-MCNC: 48.9 MG/DL
EPITH CASTS URNS QL MICRO: ABNORMAL /LPF
GLUCOSE UR STRIP.AUTO-MCNC: >1000 MG/DL
HGB UR QL STRIP: NEGATIVE
HYALINE CASTS URNS QL MICRO: ABNORMAL /LPF (ref 0–5)
KETONES UR QL STRIP.AUTO: NEGATIVE MG/DL
LEUKOCYTE ESTERASE UR QL STRIP.AUTO: NEGATIVE
Lab: ABNORMAL
MICROALBUMIN UR-MCNC: 0.51 MG/DL
MICROALBUMIN/CREAT UR-RTO: 10 MG/G (ref 0–30)
NITRITE UR QL STRIP.AUTO: NEGATIVE
NUCLEOLAR PATTERN: ABNORMAL
PH UR STRIP: 5.5 (ref 5–8)
PROT UR STRIP-MCNC: NEGATIVE MG/DL
RBC #/AREA URNS HPF: ABNORMAL /HPF (ref 0–5)
SP GR UR REFRACTOMETRY: 1.01 (ref 1–1.03)
URINE CULTURE IF INDICATED: ABNORMAL
UROBILINOGEN UR QL STRIP.AUTO: 0.2 EU/DL (ref 0.2–1)
WBC URNS QL MICRO: ABNORMAL /HPF (ref 0–4)

## 2025-06-27 NOTE — TELEPHONE ENCOUNTER
**[facility]**    Attempt made to contact patient at the home number.    Spoke to patient at home number and advised them of the previous message.    Patient verified understanding and scheduled a in person appointment .  Appointment scheduled for 7/17/25 at 9:30 am.    Niharika Reza [facility]   Ambulatory Pharmacy Technician Clinical   [phone]  Department, toll free: [phone], option 2       For Pharmacy Admin Tracking Only    Program: Medical Group  Recommendation Provided To: Patient/Caregiver: 1 via Telephone  Intervention Detail: Scheduled Appointment  Intervention Accepted By: Patient/Caregiver: 1  Gap Closed?: Yes   Time Spent (min): 10

## 2025-06-27 NOTE — TELEPHONE ENCOUNTER
Reason for referral: DM  Referring provider: Rosa Mathur  Referring provider office: [facility]  Referred to: Kristan Sky, PharmD, BCGP, BCACP  Status of Patient: New Patient  Length of Appt: 60 minutes  Type of Appt: In Person   Patient need address: No

## 2025-07-01 LAB
C TRACH RRNA SPEC QL NAA+PROBE: NEGATIVE
N GONORRHOEA RRNA SPEC QL NAA+PROBE: NEGATIVE
SPECIMEN SOURCE: NORMAL
T VAGINALIS RRNA SPEC QL NAA+PROBE: NEGATIVE

## 2025-07-04 PROBLEM — E11.9 TYPE 2 DIABETES MELLITUS (HCC): Status: ACTIVE | Noted: 2022-03-18

## 2025-07-04 PROBLEM — E66.9 OBESITY (BMI 30-39.9): Status: ACTIVE | Noted: 2020-02-04

## 2025-07-07 ENCOUNTER — CLINICAL DOCUMENTATION (OUTPATIENT)
Age: 55
End: 2025-07-07

## 2025-07-10 ENCOUNTER — TELEPHONE (OUTPATIENT)
Age: 55
End: 2025-07-10

## 2025-07-10 NOTE — TELEPHONE ENCOUNTER
----- Message from HAYES CRAIN RN sent at 7/7/2025  9:55 AM EDT -----  Regarding: FW: Elev ALP persistes.  Liver function noraml.  Positive markers for immune liver disease.  MRI and MRCP  Patient will need 3 month follow up with MLS. Once you have date I will mail MRI order to patient also. Thanks  ----- Message -----  From: Clovis Betts MD  Sent: 7/4/2025   7:51 AM EDT  To: Judith Moss RN  Subject: Elev ALP persistes.  Liver function noraml. #    Move FU up to see MLS in 3 months.  Get MRI 2-4 weeks before next appt.

## 2025-07-17 ENCOUNTER — PHARMACY VISIT (OUTPATIENT)
Age: 55
End: 2025-07-17

## 2025-07-17 VITALS — BODY MASS INDEX: 30.59 KG/M2 | WEIGHT: 201.2 LBS

## 2025-07-17 DIAGNOSIS — E11.65 UNCONTROLLED TYPE 2 DIABETES MELLITUS WITH HYPERGLYCEMIA (HCC): Primary | ICD-10-CM

## 2025-07-17 NOTE — PROGRESS NOTES
Pharmacy Progress Note - Diabetes Management    S/O: Mr. Rudi Oh is a 54 y.o. male, referred by Rosa Bennett, APRN - NP,  has a past medical history of Arthritis, Diabetes (HCC), Elevated liver enzymes, Hypercholesterolemia, Psoriasis, and Psoriatic arthritis (HCC)..  Pt was seen today for diabetes management.  Patient's last A1c was:   Hemoglobin A1C   Date Value Ref Range Status   03/31/2021 7.6 (H) 4.0 - 5.6 % Final     Comment:     NEW METHOD PLEASE NOTE NEW REFERENCE RANGE  (NOTE)  HbA1C Interpretive Ranges  <5.7              Normal  5.7 - 6.4         Consider Prediabetes  >6.5              Consider Diabetes       Hemoglobin A1C, POC   Date Value Ref Range Status   06/26/2025 8.2 % Final       Subjective      Interim Update: Pt was last seen by this writer on 1/28/25 for DM management.  He was found to be nonadherent to several medications.  Encouraged to restart Jardiance, not restart Januvia, and increase Trulicity dose.  Pt was ordered glucometer supplies to check rdgs.  Since then pt was seen by PCP, new A1c was collected and found to be higher than in January.  Pt was referred back to this writer.    Pt arrives to clinic and states he is unable to cook and clean for himself d/t joint pain.  Some days he eats nothing and other days he goes to [facility] and eats large meals.  When he cooks at home he will cook chicken or eggs.  This has resulted in BG rdgs being variable.    Pt reports adherence to current regimen.    He notes that his [place] Medicaid has run out and his new insurance is starting; however, he does not have a card and needs to provide this info to the pharmacy for continued coverage.      Diabetes Management:  - Previous anti-hyperglycemic agents includes: Metformin (d/c'd d/t nausea)    Current anti-hyperglycemic regimen includes:    Key Antihyperglycemic Medications              empagliflozin (JARDIANCE) 25 MG tablet Take 1 tablet by mouth daily    glimepiride (AMARYL) 4 MG

## 2025-07-17 NOTE — PATIENT INSTRUCTIONS
Your A1c was 8.2% which was uncontrolled.    Call me to let me know which blood sugar meter you have.  I can send in the correct test strips to the pharmacy.    Blood Sugar Goal  Fastin-130 mg/dL  1-2 after meals: Less than 180 mg/dL    Carbohydrate Goals  Meal: 30-45 grams   Snacks: 15 grams    Pharmacist Contact Information:  Gen KateD, BCGP, BCACP  Work Phone: [phone] (option #2)

## 2025-07-23 ENCOUNTER — HOSPITAL ENCOUNTER (OUTPATIENT)
Age: 55
Discharge: HOME | End: 2025-07-26
Payer: MEDICAID

## 2025-07-23 DIAGNOSIS — R74.8 ELEVATED ALKALINE PHOSPHATASE IN NEWBORN: ICD-10-CM

## 2025-07-23 PROCEDURE — 74183 MRI ABD W/O CNTR FLWD CNTR: CPT

## 2025-07-23 PROCEDURE — A9579 GAD-BASE MR CONTRAST NOS,1ML: HCPCS | Performed by: RADIOLOGY

## 2025-07-23 PROCEDURE — 6360000004 HC RX CONTRAST MEDICATION: Performed by: RADIOLOGY

## 2025-07-23 RX ORDER — GADOTERIDOL 279.3 MG/ML
19 INJECTION INTRAVENOUS
Status: COMPLETED | OUTPATIENT
Start: 2025-07-23 | End: 2025-07-23

## 2025-07-23 RX ADMIN — GADOTERIDOL 19 ML: 279.3 INJECTION, SOLUTION INTRAVENOUS at 14:50

## 2025-07-24 ENCOUNTER — PHARMACY VISIT (OUTPATIENT)
Age: 55
End: 2025-07-24

## 2025-07-24 VITALS — BODY MASS INDEX: 30.38 KG/M2 | WEIGHT: 199.8 LBS

## 2025-07-24 DIAGNOSIS — E11.65 UNCONTROLLED TYPE 2 DIABETES MELLITUS WITH HYPERGLYCEMIA (HCC): Primary | ICD-10-CM

## 2025-07-24 NOTE — PROGRESS NOTES
Pharmacy Progress Note - Diabetes Management    S/O: Mr. Rudi Oh is a 54 y.o. male, referred by Rosa Bennett, APRN - NP,  has a past medical history of Arthritis, Diabetes (HCC), Elevated liver enzymes, Hypercholesterolemia, Psoriasis, and Psoriatic arthritis (HCC)..  Pt was seen today for diabetes management.  Patient's last A1c was:   Hemoglobin A1C   Date Value Ref Range Status   03/31/2021 7.6 (H) 4.0 - 5.6 % Final     Comment:     NEW METHOD PLEASE NOTE NEW REFERENCE RANGE  (NOTE)  HbA1C Interpretive Ranges  <5.7              Normal  5.7 - 6.4         Consider Prediabetes  >6.5              Consider Diabetes       Hemoglobin A1C, POC   Date Value Ref Range Status   06/26/2025 8.2 % Final       Subjective      Interim Update: Pt arrives to clinic today and states he has not received his new insurance card yet.      Notes he finished last dose of Trulicity 3-4 days ago and wants to know if he can get a refill.  PCP had provided an Rx with 3 refills on it last month.  Advised that he could request refill from the pharmacy.    Pt arrived to clinic for CGM teaching.    Freestyle Jake Education:  Pt was provided sample CGM Freestyle Jake 3 sensor.  Sensor was applied without incident.  Pt was educated on changing the setting ranges and goals BG, meaning of arrows by blood sugar readings and how to address if indicated, when to check BG with fingerstick, scan for sugar / review of data / starting new sensor, 60 minute delay starting scanning after activate sensor, placement of sensor on the back of the arm, alternating arms every 14 days when new sensor placed, using two alcohol swabs before placing sensor, opening sensor and applicator, how to place on arm, starting new sensor, charging, sharing data, fibers in arm / needle retracts, can shower with sensor on, can cover / use barrier wipe if needed for adhesion, alert when time to change sensor, removing sensor, use of [employer] evie if would like to

## 2025-08-07 ENCOUNTER — PHARMACY VISIT (OUTPATIENT)
Age: 55
End: 2025-08-07

## 2025-08-07 DIAGNOSIS — E11.65 UNCONTROLLED TYPE 2 DIABETES MELLITUS WITH HYPERGLYCEMIA (HCC): Primary | ICD-10-CM

## 2025-08-07 RX ORDER — GLIMEPIRIDE 2 MG/1
2 TABLET ORAL EVERY MORNING
Qty: 90 TABLET | Refills: 1 | Status: SHIPPED | OUTPATIENT
Start: 2025-08-07